# Patient Record
Sex: FEMALE | Race: WHITE | NOT HISPANIC OR LATINO | Employment: UNEMPLOYED | URBAN - METROPOLITAN AREA
[De-identification: names, ages, dates, MRNs, and addresses within clinical notes are randomized per-mention and may not be internally consistent; named-entity substitution may affect disease eponyms.]

---

## 2017-06-27 PROBLEM — E66.9 MILD OBESITY: Status: ACTIVE | Noted: 2017-06-27

## 2017-06-27 PROBLEM — R07.9 CHEST PAIN: Status: ACTIVE | Noted: 2017-06-27

## 2017-11-27 ENCOUNTER — HOSPITAL ENCOUNTER (INPATIENT)
Facility: OTHER | Age: 82
LOS: 3 days | Discharge: HOME | DRG: 247 | End: 2017-11-30
Attending: EMERGENCY MEDICINE | Admitting: INTERNAL MEDICINE
Payer: MEDICARE

## 2017-11-27 DIAGNOSIS — R79.89 ELEVATED TROPONIN: ICD-10-CM

## 2017-11-27 DIAGNOSIS — R55 NEAR SYNCOPE: Primary | ICD-10-CM

## 2017-11-27 DIAGNOSIS — I21.4 NON-ST ELEVATION MYOCARDIAL INFARCTION (NSTEMI): ICD-10-CM

## 2017-11-27 DIAGNOSIS — R05.9 COUGH: ICD-10-CM

## 2017-11-27 LAB
ALBUMIN SERPL BCP-MCNC: 3.2 G/DL
ALP SERPL-CCNC: 64 U/L
ALT SERPL W/O P-5'-P-CCNC: 23 U/L
ANION GAP SERPL CALC-SCNC: 6 MMOL/L
AST SERPL-CCNC: 24 U/L
BACTERIA #/AREA URNS HPF: ABNORMAL /HPF
BASOPHILS # BLD AUTO: 0.03 K/UL
BASOPHILS NFR BLD: 0.5 %
BILIRUB SERPL-MCNC: 0.3 MG/DL
BILIRUB UR QL STRIP: NEGATIVE
BUN SERPL-MCNC: 26 MG/DL
CALCIUM SERPL-MCNC: 9.3 MG/DL
CHLORIDE SERPL-SCNC: 103 MMOL/L
CLARITY UR: CLEAR
CO2 SERPL-SCNC: 33 MMOL/L
COLOR UR: YELLOW
CREAT SERPL-MCNC: 1 MG/DL
DIFFERENTIAL METHOD: ABNORMAL
EOSINOPHIL # BLD AUTO: 0.2 K/UL
EOSINOPHIL NFR BLD: 2.9 %
ERYTHROCYTE [DISTWIDTH] IN BLOOD BY AUTOMATED COUNT: 13.7 %
EST. GFR  (AFRICAN AMERICAN): 59 ML/MIN/1.73 M^2
EST. GFR  (NON AFRICAN AMERICAN): 51 ML/MIN/1.73 M^2
GLUCOSE SERPL-MCNC: 132 MG/DL
GLUCOSE UR QL STRIP: NEGATIVE
HCT VFR BLD AUTO: 40.4 %
HGB BLD-MCNC: 12.8 G/DL
HGB UR QL STRIP: ABNORMAL
HYALINE CASTS #/AREA URNS LPF: 1 /LPF
KETONES UR QL STRIP: NEGATIVE
LEUKOCYTE ESTERASE UR QL STRIP: ABNORMAL
LYMPHOCYTES # BLD AUTO: 2 K/UL
LYMPHOCYTES NFR BLD: 29.9 %
MAGNESIUM SERPL-MCNC: 1.9 MG/DL
MCH RBC QN AUTO: 29.8 PG
MCHC RBC AUTO-ENTMCNC: 31.7 G/DL
MCV RBC AUTO: 94 FL
MICROSCOPIC COMMENT: ABNORMAL
MONOCYTES # BLD AUTO: 0.7 K/UL
MONOCYTES NFR BLD: 10 %
NEUTROPHILS # BLD AUTO: 3.7 K/UL
NEUTROPHILS NFR BLD: 56.4 %
NITRITE UR QL STRIP: NEGATIVE
NON-SQ EPI CELLS #/AREA URNS HPF: 2 /HPF
PH UR STRIP: 6 [PH] (ref 5–8)
PLATELET # BLD AUTO: 200 K/UL
PMV BLD AUTO: 11.7 FL
POCT GLUCOSE: 137 MG/DL (ref 70–110)
POTASSIUM SERPL-SCNC: 3.9 MMOL/L
PROT SERPL-MCNC: 7.4 G/DL
PROT UR QL STRIP: NEGATIVE
RBC # BLD AUTO: 4.29 M/UL
RBC #/AREA URNS HPF: 5 /HPF (ref 0–4)
SODIUM SERPL-SCNC: 142 MMOL/L
SP GR UR STRIP: 1.01 (ref 1–1.03)
SQUAMOUS #/AREA URNS HPF: 6 /HPF
TROPONIN I SERPL DL<=0.01 NG/ML-MCNC: 1.98 NG/ML
TROPONIN I SERPL DL<=0.01 NG/ML-MCNC: 4.67 NG/ML
URN SPEC COLLECT METH UR: ABNORMAL
UROBILINOGEN UR STRIP-ACNC: NEGATIVE EU/DL
WBC # BLD AUTO: 6.53 K/UL
WBC #/AREA URNS HPF: 33 /HPF (ref 0–5)

## 2017-11-27 PROCEDURE — 83735 ASSAY OF MAGNESIUM: CPT

## 2017-11-27 PROCEDURE — 82962 GLUCOSE BLOOD TEST: CPT

## 2017-11-27 PROCEDURE — 80053 COMPREHEN METABOLIC PANEL: CPT

## 2017-11-27 PROCEDURE — 93010 ELECTROCARDIOGRAM REPORT: CPT | Mod: ,,, | Performed by: INTERNAL MEDICINE

## 2017-11-27 PROCEDURE — 11000001 HC ACUTE MED/SURG PRIVATE ROOM

## 2017-11-27 PROCEDURE — 81000 URINALYSIS NONAUTO W/SCOPE: CPT

## 2017-11-27 PROCEDURE — 25000003 PHARM REV CODE 250: Performed by: EMERGENCY MEDICINE

## 2017-11-27 PROCEDURE — 96372 THER/PROPH/DIAG INJ SC/IM: CPT

## 2017-11-27 PROCEDURE — 85025 COMPLETE CBC W/AUTO DIFF WBC: CPT

## 2017-11-27 PROCEDURE — 99285 EMERGENCY DEPT VISIT HI MDM: CPT | Mod: 25

## 2017-11-27 PROCEDURE — 63600175 PHARM REV CODE 636 W HCPCS: Performed by: INTERNAL MEDICINE

## 2017-11-27 PROCEDURE — 93005 ELECTROCARDIOGRAM TRACING: CPT

## 2017-11-27 PROCEDURE — 25000003 PHARM REV CODE 250: Performed by: INTERNAL MEDICINE

## 2017-11-27 PROCEDURE — 84484 ASSAY OF TROPONIN QUANT: CPT

## 2017-11-27 PROCEDURE — 36415 COLL VENOUS BLD VENIPUNCTURE: CPT

## 2017-11-27 RX ORDER — OXYBUTYNIN CHLORIDE 5 MG/1
5 TABLET, EXTENDED RELEASE ORAL DAILY
Status: DISCONTINUED | OUTPATIENT
Start: 2017-11-28 | End: 2017-11-30 | Stop reason: HOSPADM

## 2017-11-27 RX ORDER — CLOPIDOGREL 300 MG/1
300 TABLET, FILM COATED ORAL ONCE
Status: COMPLETED | OUTPATIENT
Start: 2017-11-27 | End: 2017-11-27

## 2017-11-27 RX ORDER — NITROGLYCERIN 80 MG/1
1 PATCH TRANSDERMAL DAILY
Status: DISCONTINUED | OUTPATIENT
Start: 2017-11-27 | End: 2017-11-28

## 2017-11-27 RX ORDER — ACETAMINOPHEN 325 MG/1
650 TABLET ORAL EVERY 8 HOURS PRN
Status: DISCONTINUED | OUTPATIENT
Start: 2017-11-27 | End: 2017-11-30 | Stop reason: HOSPADM

## 2017-11-27 RX ORDER — CLOPIDOGREL BISULFATE 75 MG/1
75 TABLET ORAL DAILY
Status: DISCONTINUED | OUTPATIENT
Start: 2017-11-28 | End: 2017-11-30 | Stop reason: HOSPADM

## 2017-11-27 RX ORDER — ASPIRIN 325 MG
325 TABLET ORAL
Status: COMPLETED | OUTPATIENT
Start: 2017-11-27 | End: 2017-11-27

## 2017-11-27 RX ORDER — SODIUM CHLORIDE 0.9 % (FLUSH) 0.9 %
3 SYRINGE (ML) INJECTION
Status: DISCONTINUED | OUTPATIENT
Start: 2017-11-27 | End: 2017-11-30 | Stop reason: HOSPADM

## 2017-11-27 RX ORDER — ASPIRIN 81 MG/1
81 TABLET ORAL DAILY
Status: DISCONTINUED | OUTPATIENT
Start: 2017-11-28 | End: 2017-11-30 | Stop reason: HOSPADM

## 2017-11-27 RX ORDER — ENOXAPARIN SODIUM 100 MG/ML
1 INJECTION SUBCUTANEOUS
Status: DISCONTINUED | OUTPATIENT
Start: 2017-11-27 | End: 2017-11-27

## 2017-11-27 RX ORDER — ENOXAPARIN SODIUM 100 MG/ML
1 INJECTION SUBCUTANEOUS
Status: COMPLETED | OUTPATIENT
Start: 2017-11-27 | End: 2017-11-27

## 2017-11-27 RX ORDER — LOSARTAN POTASSIUM 50 MG/1
100 TABLET ORAL DAILY
Status: DISCONTINUED | OUTPATIENT
Start: 2017-11-28 | End: 2017-11-30 | Stop reason: HOSPADM

## 2017-11-27 RX ORDER — TORSEMIDE 10 MG/1
10 TABLET ORAL DAILY
Status: DISCONTINUED | OUTPATIENT
Start: 2017-11-28 | End: 2017-11-27

## 2017-11-27 RX ORDER — ACETAMINOPHEN 325 MG/1
650 TABLET ORAL EVERY 8 HOURS PRN
Status: DISCONTINUED | OUTPATIENT
Start: 2017-11-27 | End: 2017-11-29

## 2017-11-27 RX ORDER — NITROGLYCERIN 0.4 MG/1
0.4 TABLET SUBLINGUAL EVERY 5 MIN PRN
Status: DISCONTINUED | OUTPATIENT
Start: 2017-11-27 | End: 2017-11-29

## 2017-11-27 RX ORDER — FAMOTIDINE 20 MG/1
20 TABLET, FILM COATED ORAL 2 TIMES DAILY
Status: DISCONTINUED | OUTPATIENT
Start: 2017-11-27 | End: 2017-11-30 | Stop reason: HOSPADM

## 2017-11-27 RX ORDER — METOPROLOL SUCCINATE 50 MG/1
50 TABLET, EXTENDED RELEASE ORAL DAILY
Status: DISCONTINUED | OUTPATIENT
Start: 2017-11-28 | End: 2017-11-30 | Stop reason: HOSPADM

## 2017-11-27 RX ORDER — RAMELTEON 8 MG/1
8 TABLET ORAL NIGHTLY PRN
Status: DISCONTINUED | OUTPATIENT
Start: 2017-11-27 | End: 2017-11-30 | Stop reason: HOSPADM

## 2017-11-27 RX ADMIN — ACETAMINOPHEN 650 MG: 325 TABLET ORAL at 08:11

## 2017-11-27 RX ADMIN — NITROGLYCERIN TRANSDERMAL SYSTEM 1 PATCH: 0.4 PATCH, EXTENDED RELEASE TRANSDERMAL at 08:11

## 2017-11-27 RX ADMIN — ASPIRIN 325 MG ORAL TABLET 325 MG: 325 PILL ORAL at 06:11

## 2017-11-27 RX ADMIN — FAMOTIDINE 20 MG: 20 TABLET, FILM COATED ORAL at 10:11

## 2017-11-27 RX ADMIN — ENOXAPARIN SODIUM 80 MG: 100 INJECTION SUBCUTANEOUS at 08:11

## 2017-11-27 RX ADMIN — CLOPIDOGREL BISULFATE 300 MG: 300 TABLET, FILM COATED ORAL at 08:11

## 2017-11-27 NOTE — ED TRIAGE NOTES
"Pt presents with fatigue. While ambulating to car, pt felt weak. Pt did not pass out, no injuries sustained. Pt reports "a sensation to the back of her head and then felt like her body was going to go down". Pt denies palpitations prior to passing out, but also reports a sensation to her chest. Pt denies SOB, nausea, diaphoresis. Pt denies any symptoms at this time. Pt reports no changes in bowel habits,. Pt reports decreased urinary frequency today. Pt denies any recent illnesses. Pt hx of HTN, endarterectomy, carotid stenosis. Pt compliant with medications. Medical hx list diastolic heart failure, pt not aware she was given this diagnosis.   "

## 2017-11-27 NOTE — ED NOTES
Lying on stretcher w/HOB elevated. AAOx4. Pt smiling. Denies any chest discomfort, sob, dizziness, lightheadedness, blurred vision or any discomfort at this time. Resp:20 even and unlabored. Comfort needs addressed. [place] to call light. Two warm blankets provided. Bed locked in low position, side rails up x2 and call light within reach. Will continue to monitor.

## 2017-11-27 NOTE — ED PROVIDER NOTES
"Encounter Date: 11/27/2017    SCRIBE #1 NOTE: I, Magi Doss, am scribing for, and in the presence of, Dr. Reddy.       History     Chief Complaint   Patient presents with    Fatigue     walking to car after podiatrist appointment; began feeling weak, like she was going to pass out and sob; per EMS initial /100     Time seen by provider: 5:08 PM    This is a 86 y.o. female, with history of HTN, and HLD, who presents via EMS with complaint of weakness which started a few hours ago. Sudden onset occurred while the patient was ambulating to her vehicle following a podiatry appointment. Per triage note, patient's initial BP was 240/100 upon arrival. Patient states that she felt near-syncopal and called the  for assistance. She notes that episode of weakness lasted for approximately ten minutes. The patient reports associated decreased urination over the last few days as well as weak sensation ("funny feeling") in the chest. She denies associated LOC, fever, chills, cough, congestion, nausea, vomiting, SOB, chest pain, palpitations, headache, visual disturbance, abdominal pain, diarrhea, or constipation. Patient adds that she last consumed food approximately one hour prior to near-syncopal episode. She states that she currently feels back to baseline. Patient is compliant to HTN medications last taken this morning. The patient has no additional complaints.      The history is provided by the patient.     Review of patient's allergies indicates:   Allergen Reactions    Apresoline [hydralazine]     Statins-hmg-coa reductase inhibitors      Cramps in legs and feet.    Streptomycin      Told never to take it would not make it to hospital     Past Medical History:   Diagnosis Date    Aortic valve disorders 7/4/2013    Carotid stenosis 6/3/2013    2/3/2011: L CEA for 80%+ stenosis with small ulcer. Mild WILLIAM disease.    Heart failure, diastolic 6/3/2013    Hypercholesterolemia 7/4/2013    " Cannot tolerate statins -muscle pains.    Hypertension, benign 6/3/2013    Diagnosed 1955.     Past Surgical History:   Procedure Laterality Date    APPENDECTOMY      BUNIONECTOMY Bilateral     CAROTID ENDARTERECTOMY      CATARACT EXTRACTION, BILATERAL      HYSTERECTOMY      TONSILLECTOMY       History reviewed. No pertinent family history.  Social History   Substance Use Topics    Smoking status: Former Smoker     Packs/day: 0.25     Years: 7.00     Start date: 1/1/1951     Quit date: 1/1/1958    Smokeless tobacco: Never Used    Alcohol use Yes      Comment: monthly      Review of Systems   Constitutional: Negative for chills and fever.   HENT: Negative for congestion and sore throat.    Eyes: Negative for visual disturbance.   Respiratory: Negative for cough and shortness of breath.    Cardiovascular: Negative for chest pain, palpitations and leg swelling.        Weakness sensation in the chest.   Gastrointestinal: Negative for abdominal pain, constipation, diarrhea, nausea and vomiting.   Genitourinary: Positive for decreased urine volume. Negative for dysuria.   Musculoskeletal: Negative for back pain.   Skin: Negative for rash.   Neurological: Positive for weakness (resolved). Negative for syncope and headaches.   Hematological: Does not bruise/bleed easily.       Physical Exam     Initial Vitals [11/27/17 1626]   BP Pulse Resp Temp SpO2   (!) 198/89 (!) 59 17 97.3 °F (36.3 °C) 95 %      MAP       125.33         Physical Exam    Nursing note and vitals reviewed.  Constitutional: She appears well-developed and well-nourished. She is not diaphoretic. She is cooperative.  Non-toxic appearance. No distress.   HENT:   Head: Normocephalic and atraumatic.   Eyes: Conjunctivae and EOM are normal. Pupils are equal, round, and reactive to light.   Neck: Normal range of motion and full passive range of motion without pain. Neck supple. No thyromegaly present. No JVD present.   Cardiovascular: Normal rate,  regular rhythm, normal heart sounds and normal pulses. Exam reveals no gallop and no friction rub.    No murmur heard.  Pulmonary/Chest: Effort normal and breath sounds normal. No respiratory distress. She has no wheezes. She has no rhonchi. She has no rales.   Abdominal: Soft. Normal appearance and bowel sounds are normal. She exhibits no distension and no mass. There is no tenderness. There is no rebound and no guarding.   Musculoskeletal: Normal range of motion.   Neurological: She is alert and oriented to person, place, and time. She has normal strength. No cranial nerve deficit or sensory deficit.   No facial droop. Normal speech.   Skin: Skin is warm, dry and intact. No rash noted.   Psychiatric: She has a normal mood and affect. Her speech is normal and behavior is normal. Judgment and thought content normal.         ED Course   Procedures  Labs Reviewed   CBC W/ AUTO DIFFERENTIAL - Abnormal; Notable for the following:        Result Value    MCHC 31.7 (*)     All other components within normal limits   COMPREHENSIVE METABOLIC PANEL - Abnormal; Notable for the following:     CO2 33 (*)     Glucose 132 (*)     BUN, Bld 26 (*)     Albumin 3.2 (*)     Anion Gap 6 (*)     eGFR if  59 (*)     eGFR if non  51 (*)     All other components within normal limits   TROPONIN I - Abnormal; Notable for the following:     Troponin I 1.979 (*)     All other components within normal limits   URINALYSIS - Abnormal; Notable for the following:     Occult Blood UA Trace (*)     Leukocytes, UA 2+ (*)     All other components within normal limits   URINALYSIS MICROSCOPIC - Abnormal; Notable for the following:     RBC, UA 5 (*)     WBC, UA 33 (*)     Non-Squam Epith 2 (*)     All other components within normal limits   POCT GLUCOSE - Abnormal; Notable for the following:     POCT Glucose 137 (*)     All other components within normal limits   MAGNESIUM   TROPONIN I   POCT GLUCOSE MONITORING CONTINUOUS      EKG Readings: (Independently Interpreted)   Initial Reading: No STEMI.   EKG Reading (5:02 PM)- Sinus bradycardia at a rate of 55 bpm. Normal axis. Narrow QRS complex. No STEMI. Unchanged from 2016.          Medical Decision Making:   Initial Assessment:   Urgent evaluation a 86-year-old female with challenging to control hypertension, here given episode of near syncope occurring this afternoon approximately 3 PM after visiting her podiatrist.  Patient endorses ambulating to her car, with abrupt onset of dizziness.  Patient denies palpitations nor shortness of breath, and no known hx of PE.  Patient had a normal stress echo 8/2017 with mild aortic stenosis noted 7/17 per cardiology- Dr Wilson. Pt did have a small meal earlier in day and take bp meds. + urinary hesitancy in last week. Broad ddx includes uti, vasovagal episode, dysrhythmia, vertigo, orthostasis. Will obtain labs including cardiac markers given comorbid conditions and reassess.   Independently Interpreted Test(s):   I have ordered and independently interpreted X-rays - see prior notes.  I have ordered and independently interpreted EKG Reading(s) - see prior notes  Clinical Tests:   Lab Tests: Ordered and Reviewed  Radiological Study: Ordered and Reviewed  Medical Tests: Ordered and Reviewed  ED Management:  Labs notable for elevated troponin, concerning given hx of vascular disease. Call made to cardiology and aspirin, lovenox ordered.    6:18PM- Discussed case with Dr. Wilson. Will admit patient to Cardiology with plans for cath in am.     Imaging Results          X-Ray Chest 1 View (Final result)  Result time 11/27/17 17:39:28    Final result by Prateek Juárez MD (11/27/17 17:39:28)                 Impression:      No acute cardiopulmonary process identified.      Electronically signed by: PRATEEK JUÁREZ MD  Date:     11/27/17  Time:    17:39              Narrative:    Chest AP single view.  Comparison: 8/2012, 2/2011, CT  9/2008.    Cardiac silhouette is upper limits of normal in size, noting magnification on the single AP view.  Lungs are slightly hypoinflated which accentuates pulmonary vascular markings.  There is prominence of the left superhilar region likely representing prominent pulmonary vasculature in this region, noting that findings are unchanged since at least 2008.  No convincing evidence of mass lesion in this region.  There is coarse bilateral interstitial attenuation.  No evidence of confluent consolidation, pneumothorax, or significant effusion.  Bones show DJD.                                      Scribe Attestation:   Scribe #1: I performed the above scribed service and the documentation accurately describes the services I performed. I attest to the accuracy of the note.    I, Dr. Lara Reddy, personally performed the services described in this documentation. All medical record entries made by the scribe were at my direction and in my presence.  I have reviewed the chart and agree that the record reflects my personal performance and is accurate and complete. Lara Reddy MD.  11:02 PM 11/27/2017          ED Course      Clinical Impression:     1. Near syncope    2. Cough    3. Elevated troponin    4. Non-ST elevation myocardial infarction (NSTEMI)          Disposition:   Disposition: Admitted  Condition: Serious                        Lara Reddy MD  11/27/17 0088

## 2017-11-28 ENCOUNTER — SURGERY (OUTPATIENT)
Age: 82
End: 2017-11-28

## 2017-11-28 PROBLEM — Z95.5 HISTORY OF CORONARY ARTERY STENT PLACEMENT: Status: ACTIVE | Noted: 2017-11-28

## 2017-11-28 PROBLEM — I25.10 CORONARY ARTERY DISEASE: Status: ACTIVE | Noted: 2017-11-28

## 2017-11-28 LAB
ANION GAP SERPL CALC-SCNC: 6 MMOL/L
BUN SERPL-MCNC: 25 MG/DL
CALCIUM SERPL-MCNC: 8.4 MG/DL
CHLORIDE SERPL-SCNC: 106 MMOL/L
CO2 SERPL-SCNC: 28 MMOL/L
CREAT SERPL-MCNC: 0.8 MG/DL
EST. GFR  (AFRICAN AMERICAN): >60 ML/MIN/1.73 M^2
EST. GFR  (NON AFRICAN AMERICAN): >60 ML/MIN/1.73 M^2
GLUCOSE SERPL-MCNC: 100 MG/DL
POTASSIUM SERPL-SCNC: 3.5 MMOL/L
SODIUM SERPL-SCNC: 140 MMOL/L
TROPONIN I SERPL DL<=0.01 NG/ML-MCNC: 1.06 NG/ML
TROPONIN I SERPL DL<=0.01 NG/ML-MCNC: 5.32 NG/ML

## 2017-11-28 PROCEDURE — C1887 CATHETER, GUIDING: HCPCS

## 2017-11-28 PROCEDURE — 84484 ASSAY OF TROPONIN QUANT: CPT

## 2017-11-28 PROCEDURE — 36415 COLL VENOUS BLD VENIPUNCTURE: CPT

## 2017-11-28 PROCEDURE — 25000003 PHARM REV CODE 250: Performed by: INTERNAL MEDICINE

## 2017-11-28 PROCEDURE — 80048 BASIC METABOLIC PNL TOTAL CA: CPT

## 2017-11-28 PROCEDURE — 93005 ELECTROCARDIOGRAM TRACING: CPT

## 2017-11-28 PROCEDURE — B2111ZZ FLUOROSCOPY OF MULTIPLE CORONARY ARTERIES USING LOW OSMOLAR CONTRAST: ICD-10-PCS | Performed by: INTERNAL MEDICINE

## 2017-11-28 PROCEDURE — 027034Z DILATION OF CORONARY ARTERY, ONE ARTERY WITH DRUG-ELUTING INTRALUMINAL DEVICE, PERCUTANEOUS APPROACH: ICD-10-PCS | Performed by: INTERNAL MEDICINE

## 2017-11-28 PROCEDURE — 4A023N7 MEASUREMENT OF CARDIAC SAMPLING AND PRESSURE, LEFT HEART, PERCUTANEOUS APPROACH: ICD-10-PCS | Performed by: INTERNAL MEDICINE

## 2017-11-28 PROCEDURE — 25000003 PHARM REV CODE 250: Performed by: EMERGENCY MEDICINE

## 2017-11-28 PROCEDURE — 94761 N-INVAS EAR/PLS OXIMETRY MLT: CPT

## 2017-11-28 PROCEDURE — 25000003 PHARM REV CODE 250

## 2017-11-28 PROCEDURE — 20000000 HC ICU ROOM

## 2017-11-28 PROCEDURE — 63600175 PHARM REV CODE 636 W HCPCS

## 2017-11-28 PROCEDURE — 99900035 HC TECH TIME PER 15 MIN (STAT)

## 2017-11-28 PROCEDURE — 25500020 PHARM REV CODE 255

## 2017-11-28 PROCEDURE — 93010 ELECTROCARDIOGRAM REPORT: CPT | Mod: ,,, | Performed by: INTERNAL MEDICINE

## 2017-11-28 PROCEDURE — B2151ZZ FLUOROSCOPY OF LEFT HEART USING LOW OSMOLAR CONTRAST: ICD-10-PCS | Performed by: INTERNAL MEDICINE

## 2017-11-28 RX ORDER — HYDROCODONE BITARTRATE AND ACETAMINOPHEN 5; 325 MG/1; MG/1
1 TABLET ORAL EVERY 4 HOURS PRN
Status: DISCONTINUED | OUTPATIENT
Start: 2017-11-28 | End: 2017-11-29

## 2017-11-28 RX ORDER — ACETAMINOPHEN 325 MG/1
650 TABLET ORAL EVERY 4 HOURS PRN
Status: DISCONTINUED | OUTPATIENT
Start: 2017-11-28 | End: 2017-11-29

## 2017-11-28 RX ORDER — ONDANSETRON 8 MG/1
8 TABLET, ORALLY DISINTEGRATING ORAL EVERY 8 HOURS PRN
Status: DISCONTINUED | OUTPATIENT
Start: 2017-11-28 | End: 2017-11-29

## 2017-11-28 RX ORDER — DIPHENHYDRAMINE HCL 25 MG
25 CAPSULE ORAL
Status: DISCONTINUED | OUTPATIENT
Start: 2017-11-28 | End: 2017-11-28

## 2017-11-28 RX ORDER — SODIUM CHLORIDE 9 MG/ML
INJECTION, SOLUTION INTRAVENOUS CONTINUOUS
Status: DISCONTINUED | OUTPATIENT
Start: 2017-11-28 | End: 2017-11-28

## 2017-11-28 RX ORDER — TORSEMIDE 10 MG/1
10 TABLET ORAL DAILY
Status: DISCONTINUED | OUTPATIENT
Start: 2017-11-29 | End: 2017-11-30 | Stop reason: HOSPADM

## 2017-11-28 RX ORDER — SODIUM CHLORIDE 9 MG/ML
INJECTION, SOLUTION INTRAVENOUS CONTINUOUS
Status: ACTIVE | OUTPATIENT
Start: 2017-11-28 | End: 2017-11-28

## 2017-11-28 RX ORDER — SPIRONOLACTONE 25 MG/1
25 TABLET ORAL DAILY
Status: DISCONTINUED | OUTPATIENT
Start: 2017-11-28 | End: 2017-11-30 | Stop reason: HOSPADM

## 2017-11-28 RX ADMIN — ACETAMINOPHEN 650 MG: 325 TABLET ORAL at 04:11

## 2017-11-28 RX ADMIN — HYDROCODONE BITARTRATE AND ACETAMINOPHEN 1 TABLET: 5; 325 TABLET ORAL at 11:11

## 2017-11-28 RX ADMIN — NITROGLYCERIN TRANSDERMAL SYSTEM 1 PATCH: 0.4 PATCH, EXTENDED RELEASE TRANSDERMAL at 11:11

## 2017-11-28 RX ADMIN — LOSARTAN POTASSIUM 100 MG: 50 TABLET, FILM COATED ORAL at 11:11

## 2017-11-28 RX ADMIN — METOPROLOL SUCCINATE 50 MG: 50 TABLET, EXTENDED RELEASE ORAL at 11:11

## 2017-11-28 RX ADMIN — SODIUM CHLORIDE: 0.9 INJECTION, SOLUTION INTRAVENOUS at 07:11

## 2017-11-28 RX ADMIN — FAMOTIDINE 20 MG: 20 TABLET, FILM COATED ORAL at 08:11

## 2017-11-28 RX ADMIN — OXYBUTYNIN CHLORIDE 5 MG: 5 TABLET, EXTENDED RELEASE ORAL at 11:11

## 2017-11-28 RX ADMIN — FAMOTIDINE 20 MG: 20 TABLET, FILM COATED ORAL at 11:11

## 2017-11-28 RX ADMIN — SPIRONOLACTONE 25 MG: 25 TABLET, FILM COATED ORAL at 08:11

## 2017-11-28 RX ADMIN — HYDROCODONE BITARTRATE AND ACETAMINOPHEN 1 TABLET: 5; 325 TABLET ORAL at 06:11

## 2017-11-28 RX ADMIN — SODIUM CHLORIDE: 0.9 INJECTION, SOLUTION INTRAVENOUS at 11:11

## 2017-11-28 RX ADMIN — CLOPIDOGREL 75 MG: 75 TABLET, FILM COATED ORAL at 11:11

## 2017-11-28 RX ADMIN — ASPIRIN 81 MG: 81 TABLET, COATED ORAL at 11:11

## 2017-11-28 RX ADMIN — DIPHENHYDRAMINE HYDROCHLORIDE 25 MG: 25 CAPSULE ORAL at 07:11

## 2017-11-28 RX ADMIN — NITROGLYCERIN 0.4 MG: 0.4 TABLET SUBLINGUAL at 11:11

## 2017-11-28 NOTE — SUBJECTIVE & OBJECTIVE
Past Medical History:   Diagnosis Date    Aortic valve disorders 7/4/2013    Carotid stenosis 6/3/2013    2/3/2011: L CEA for 80%+ stenosis with small ulcer. Mild WILLIAM disease.    Heart failure, diastolic 6/3/2013    Hypercholesterolemia 7/4/2013    Cannot tolerate statins -muscle pains.    Hypertension, benign 6/3/2013    Diagnosed 1955.       Past Surgical History:   Procedure Laterality Date    APPENDECTOMY      BUNIONECTOMY Bilateral     CAROTID ENDARTERECTOMY      CATARACT EXTRACTION, BILATERAL      HYSTERECTOMY      TONSILLECTOMY         Review of patient's allergies indicates:   Allergen Reactions    Apresoline [hydralazine]     Statins-hmg-coa reductase inhibitors      Cramps in legs and feet.    Streptomycin      Told never to take it would not make it to hospital       No current facility-administered medications on file prior to encounter.      Current Outpatient Prescriptions on File Prior to Encounter   Medication Sig    aspirin (ECOTRIN) 81 MG EC tablet Take 1 tablet (81 mg total) by mouth once daily.    clorazepate (TRANXENE) 3.75 MG Tab Take 7.5 mg by mouth 3 (three) times daily.    losartan (COZAAR) 100 MG tablet Take 1 tablet (100 mg total) by mouth once daily.    metoprolol succinate (TOPROL-XL) 50 MG 24 hr tablet Take 1 tablet (50 mg total) by mouth once daily.    oxybutynin (DITROPAN-XL) 5 MG TR24 Take 5 mg by mouth once daily.    torsemide (DEMADEX) 10 MG Tab Take 1 tablet (10 mg total) by mouth once daily.     Family History     None        Social History Main Topics    Smoking status: Former Smoker     Packs/day: 0.25     Years: 7.00     Start date: 1/1/1951     Quit date: 1/1/1958    Smokeless tobacco: Never Used    Alcohol use Yes      Comment: monthly     Drug use: No    Sexual activity: Not on file     Review of Systems   Constitution: Negative for chills, fever, weakness and malaise/fatigue.   HENT: Negative for nosebleeds.    Eyes: Negative for double vision,  vision loss in left eye and vision loss in right eye.   Cardiovascular: Positive for chest pain. Negative for claudication, dyspnea on exertion, irregular heartbeat, leg swelling, near-syncope, orthopnea, palpitations, paroxysmal nocturnal dyspnea and syncope.   Respiratory: Negative for cough, hemoptysis, shortness of breath and wheezing.    Endocrine: Negative for cold intolerance and heat intolerance.   Hematologic/Lymphatic: Negative for bleeding problem. Does not bruise/bleed easily.   Skin: Negative for color change and rash.   Musculoskeletal: Positive for back pain and neck pain. Negative for falls, muscle weakness and myalgias.   Gastrointestinal: Negative for heartburn, hematemesis, hematochezia, hemorrhoids, jaundice, melena, nausea and vomiting.   Genitourinary: Negative for dysuria and hematuria.   Neurological: Negative for dizziness, focal weakness, headaches, light-headedness, loss of balance, numbness and vertigo.   Psychiatric/Behavioral: Negative for altered mental status, depression and memory loss. The patient is not nervous/anxious.    Allergic/Immunologic: Negative for hives and persistent infections.     Objective:     Vital Signs (Most Recent):  Temp: 97.3 °F (36.3 °C) (11/27/17 1626)  Pulse: 60 (11/27/17 1925)  Resp: 18 (11/27/17 1925)  BP: (!) 184/74 (11/27/17 1925)  SpO2: 99 % (11/27/17 1925) Vital Signs (24h Range):  Temp:  [97.3 °F (36.3 °C)] 97.3 °F (36.3 °C)  Pulse:  [54-63] 60  Resp:  [17-19] 18  SpO2:  [95 %-99 %] 99 %  BP: (171-221)/(72-94) 184/74     Weight: 81.2 kg (179 lb)  Body mass index is 30.73 kg/m².    SpO2: 99 %  O2 Device (Oxygen Therapy): room air    No intake or output data in the 24 hours ending 11/27/17 1931    Lines/Drains/Airways     Peripheral Intravenous Line                 Peripheral IV - Single Lumen 11/27/17 1623 Left Antecubital less than 1 day                Physical Exam   Constitutional: She is oriented to person, place, and time. She appears  well-developed and well-nourished.  Non-toxic appearance. No distress.   HENT:   Head: Normocephalic and atraumatic.   Nose: Nose normal.   Eyes: Right eye exhibits no discharge. Left eye exhibits no discharge. Right conjunctiva is not injected. Left conjunctiva is not injected. Right pupil is round. Left pupil is round. Pupils are equal.   Neck: Neck supple. No JVD present. Carotid bruit is not present. No thyromegaly present.   Left CEA scar.   Cardiovascular: Normal rate, regular rhythm, S1 normal and S2 normal.   No extrasystoles are present. PMI is not displaced.  Exam reveals gallop and S4.    Murmur heard.   Harsh midsystolic murmur is present with a grade of 2/6  at the upper right sternal border  Pulses:       Radial pulses are 2+ on the right side, and 2+ on the left side.        Femoral pulses are 1+ on the right side, and 1+ on the left side.       Dorsalis pedis pulses are 1+ on the right side, and 1+ on the left side.        Posterior tibial pulses are 1+ on the right side, and 1+ on the left side.   Pulmonary/Chest: Effort normal and breath sounds normal.   Abdominal: Soft. Normal appearance. There is no hepatosplenomegaly. There is no tenderness.   Musculoskeletal:        Right ankle: She exhibits no swelling, no ecchymosis and no deformity.        Left ankle: She exhibits no swelling, no ecchymosis and no deformity.   Lymphadenopathy:        Head (right side): No submandibular adenopathy present.        Head (left side): No submandibular adenopathy present.     She has no cervical adenopathy.   Neurological: She is alert and oriented to person, place, and time. She is not disoriented. No cranial nerve deficit.   Skin: Skin is warm, dry and intact. No rash noted. She is not diaphoretic. No cyanosis. Nails show no clubbing.   Psychiatric: She has a normal mood and affect. Her speech is normal and behavior is normal. Judgment and thought content normal. Cognition and memory are normal.     Current  Medications:     [START ON 11/28/2017] aspirin  81 mg Oral Daily    clopidogrel  300 mg Oral Once    [START ON 11/28/2017] clopidogrel  75 mg Oral Daily    enoxparin  1 mg/kg Subcutaneous Q12H    famotidine  20 mg Oral BID    [START ON 11/28/2017] losartan  100 mg Oral Daily    [START ON 11/28/2017] metoprolol succinate  50 mg Oral Daily    [START ON 11/28/2017] oxybutynin  5 mg Oral Daily     Current Laboratory Results:    Recent Results (from the past 24 hour(s))   POCT glucose    Collection Time: 11/27/17  4:54 PM   Result Value Ref Range    POCT Glucose 137 (H) 70 - 110 mg/dL   CBC auto differential    Collection Time: 11/27/17  5:09 PM   Result Value Ref Range    WBC 6.53 3.90 - 12.70 K/uL    RBC 4.29 4.00 - 5.40 M/uL    Hemoglobin 12.8 12.0 - 16.0 g/dL    Hematocrit 40.4 37.0 - 48.5 %    MCV 94 82 - 98 fL    MCH 29.8 27.0 - 31.0 pg    MCHC 31.7 (L) 32.0 - 36.0 g/dL    RDW 13.7 11.5 - 14.5 %    Platelets 200 150 - 350 K/uL    MPV 11.7 9.2 - 12.9 fL    Gran # 3.7 1.8 - 7.7 K/uL    Lymph # 2.0 1.0 - 4.8 K/uL    Mono # 0.7 0.3 - 1.0 K/uL    Eos # 0.2 0.0 - 0.5 K/uL    Baso # 0.03 0.00 - 0.20 K/uL    Gran% 56.4 38.0 - 73.0 %    Lymph% 29.9 18.0 - 48.0 %    Mono% 10.0 4.0 - 15.0 %    Eosinophil% 2.9 0.0 - 8.0 %    Basophil% 0.5 0.0 - 1.9 %    Differential Method Automated    Comprehensive metabolic panel    Collection Time: 11/27/17  5:09 PM   Result Value Ref Range    Sodium 142 136 - 145 mmol/L    Potassium 3.9 3.5 - 5.1 mmol/L    Chloride 103 95 - 110 mmol/L    CO2 33 (H) 23 - 29 mmol/L    Glucose 132 (H) 70 - 110 mg/dL    BUN, Bld 26 (H) 8 - 23 mg/dL    Creatinine 1.0 0.5 - 1.4 mg/dL    Calcium 9.3 8.7 - 10.5 mg/dL    Total Protein 7.4 6.0 - 8.4 g/dL    Albumin 3.2 (L) 3.5 - 5.2 g/dL    Total Bilirubin 0.3 0.1 - 1.0 mg/dL    Alkaline Phosphatase 64 55 - 135 U/L    AST 24 10 - 40 U/L    ALT 23 10 - 44 U/L    Anion Gap 6 (L) 8 - 16 mmol/L    eGFR if African American 59 (A) >60 mL/min/1.73 m^2    eGFR if  non  51 (A) >60 mL/min/1.73 m^2   Troponin I    Collection Time: 11/27/17  5:09 PM   Result Value Ref Range    Troponin I 1.979 (H) 0.000 - 0.026 ng/mL   Magnesium    Collection Time: 11/27/17  5:09 PM   Result Value Ref Range    Magnesium 1.9 1.6 - 2.6 mg/dL   Urinalysis    Collection Time: 11/27/17  5:45 PM   Result Value Ref Range    Specimen UA Urine, Clean Catch     Color, UA Yellow Yellow, Straw, Lydia    Appearance, UA Clear Clear    pH, UA 6.0 5.0 - 8.0    Specific Gravity, UA 1.015 1.005 - 1.030    Protein, UA Negative Negative    Glucose, UA Negative Negative    Ketones, UA Negative Negative    Bilirubin (UA) Negative Negative    Occult Blood UA Trace (A) Negative    Nitrite, UA Negative Negative    Urobilinogen, UA Negative <2.0 EU/dL    Leukocytes, UA 2+ (A) Negative   Urinalysis Microscopic    Collection Time: 11/27/17  5:45 PM   Result Value Ref Range    RBC, UA 5 (H) 0 - 4 /hpf    WBC, UA 33 (H) 0 - 5 /hpf    Bacteria, UA Rare None-Occ /hpf    Squam Epithel, UA 6 /hpf    Non-Squam Epith 2 (A) <1/hpf /hpf    Hyaline Casts, UA 1 0-1/lpf /lpf    Microscopic Comment SEE COMMENT      Current Imaging Results:    Imaging Results          X-Ray Chest 1 View (Final result)  Result time 11/27/17 17:39:28    Final result by Prateek Juárez MD (11/27/17 17:39:28)                 Impression:      No acute cardiopulmonary process identified.      Electronically signed by: PRATEEK JUÁREZ MD  Date:     11/27/17  Time:    17:39              Narrative:    Chest AP single view.  Comparison: 8/2012, 2/2011, CT 9/2008.    Cardiac silhouette is upper limits of normal in size, noting magnification on the single AP view.  Lungs are slightly hypoinflated which accentuates pulmonary vascular markings.  There is prominence of the left superhilar region likely representing prominent pulmonary vasculature in this region, noting that findings are unchanged since at least 2008.  No convincing evidence of mass  lesion in this region.  There is coarse bilateral interstitial attenuation.  No evidence of confluent consolidation, pneumothorax, or significant effusion.  Bones show DJD.                            ECG: NSR. Very subtle ST depression in the inferior leads.

## 2017-11-28 NOTE — NURSING
Pt transported to room accompanied by tech and family members. AAOX4. Oriented to room. No distress noted.

## 2017-11-28 NOTE — HPI
Debo Ceballos is a 86 y.o. female with long history of difficult to control hypertension and hypercholesterolemia. She is mildly obese. She has severe carotid artery disease and underwent left carotid endarterectomy in 2011. She cannot tolerate statins as that causes severe muscle pains. She could not tolerate carvedilol either as she felt that caused her to get an unsteady gait and upset stomach. She could not tolerate amlodipine as that caused leg edema. She had an MRI for questionable slurred speech in 4/2013 which revealed generalized atrophy. Beginning in 7/2017 after her  passed she experienced occasional mild chest pressure. On 8/9/2017 she had a Stress Echo and was able to do 3:00 minutes without any CP and the ECG was negative as was the Echo. The chest discomfort resolved.    On 11/27/2017 after visiting her podiatrist she walked out to her car. She fairly suddenly fel a mild chest pressure with a discomfort in the back of her neck. She denies any dyspnea. She felt weak and thought she may pass out. She called for help and was brought to the ER. She had no further chest discomfort. The ECG does not reveal any clear acute changes. Troponin however is elevated to 2.0. She is admitted.

## 2017-11-28 NOTE — PLAN OF CARE
ATTN: TEAM DC PLANNING    Not in room    RNCM / SSW TO F/U WITH ASSESSMENT LATER     Higinio Estrada RN  Case management 11/28/201711:03 AM  # [phone] (FAX) [phone]

## 2017-11-28 NOTE — BRIEF OP NOTE
11/28/2017: [facility] [place]: LAD: 80% involving D1. D1 osteal 80%. LV: Normal systolic function. LAD: LM 3.0 x 18 mm. D1: PTCA 2.5 mm.    Ken Wilson M.D.

## 2017-11-28 NOTE — PLAN OF CARE
SW met with pt at bedside to complete disharge assessment.  Pt lives alone but daughter lives next door.  Pt is independent with ADL and drives self.  No needs identified at this time.     11/28/17 1611   Discharge Assessment   Assessment Type Discharge Planning Assessment   Confirmed/corrected address and phone number on facesheet? Yes   Assessment information obtained from? Patient   Communicated expected length of stay with patient/caregiver no   Prior to hospitilization cognitive status: Alert/Oriented   Prior to hospitalization functional status: Independent   Current cognitive status: Alert/Oriented   Current Functional Status: Needs Assistance   Lives With alone   Able to Return to Prior Arrangements yes   Is patient able to care for self after discharge? Unable to determine at this time (comments)   Who are your caregiver(s) and their phone number(s)? (Betsy, daughter, [phone])   Readmission Within The Last 30 Days no previous admission in last 30 days   Patient currently being followed by outpatient case management? No   Patient currently receives any other outside agency services? No   Equipment Currently Used at Home none   Do you have any problems affording any of your prescribed medications? No   Is the patient taking medications as prescribed? yes   Does the patient have transportation home? Yes   Transportation Available family or friend will provide  (personal transportation)   Does the patient receive services at the [facility]? No   Discharge Plan A Home   Discharge Plan B Home Health

## 2017-11-28 NOTE — PLAN OF CARE
Problem: Patient Care Overview  Goal: Plan of Care Review  Outcome: Ongoing (interventions implemented as appropriate)  PT Npo for angiogram. Voiced no complaints at this time. Son at bedside. No distress noted. Will continue with plan of care.

## 2017-11-28 NOTE — ED NOTES
ELEVATED TROPONIN: 1.979   Dr. Reddy notified of pt's elevated troponin. Infomred MD of pt's elevated BP:214/94 No new orders received.

## 2017-11-28 NOTE — H&P
[facility]  Cardiology  History and Physical     Patient Name: Debo Ceballos  MRN: [id number]  Admission Date: 11/27/2017  Code Status: Full Code   Attending Provider: Ken Wilson MD   Primary Care Physician: Ken Wilson MD  Principal Problem:Non-ST elevation myocardial infarction (NSTEMI)    Patient information was obtained from patient, past medical records and ER records.     Subjective:     Chief Complaint:  Chest Pain      HPI:  Debo Ceballos is a 86 y.o. female with long history of difficult to control hypertension and hypercholesterolemia. She is mildly obese. She has severe carotid artery disease and underwent left carotid endarterectomy in 2011. She cannot tolerate statins as that causes severe muscle pains. She could not tolerate carvedilol either as she felt that caused her to get an unsteady gait and upset stomach. She could not tolerate amlodipine as that caused leg edema. She had an MRI for questionable slurred speech in 4/2013 which revealed generalized atrophy. Beginning in 7/2017 after her  passed she experienced occasional mild chest pressure. On 8/9/2017 she had a Stress Echo and was able to do 3:00 minutes without any CP and the ECG was negative as was the Echo.  The chest discomfort resolved.    On 11/27/2017 after visiting her podiatrist she walked out to her car. She fairly suddenly fel a mild chest pressure with a discomfort in the back of her neck. She denies any dyspnea. She felt weak and thought she may pass out. She called for help and was brought to the ER. She had no further chest discomfort. The ECG does not reveal any clear acute changes. Troponin however is elevated to 2.0. She is admitted.      Past Medical History:   Diagnosis Date    Aortic valve disorders 7/4/2013    Carotid stenosis 6/3/2013    2/3/2011: L CEA for 80%+ stenosis with small ulcer. Mild WILLIAM disease.    Heart failure, diastolic 6/3/2013    Hypercholesterolemia  7/4/2013    Cannot tolerate statins -muscle pains.    Hypertension, benign 6/3/2013    Diagnosed 1955.       Past Surgical History:   Procedure Laterality Date    APPENDECTOMY      BUNIONECTOMY Bilateral     CAROTID ENDARTERECTOMY      CATARACT EXTRACTION, BILATERAL      HYSTERECTOMY      TONSILLECTOMY         Review of patient's allergies indicates:   Allergen Reactions    Apresoline [hydralazine]     Statins-hmg-coa reductase inhibitors      Cramps in legs and feet.    Streptomycin      Told never to take it would not make it to hospital       No current facility-administered medications on file prior to encounter.      Current Outpatient Prescriptions on File Prior to Encounter   Medication Sig    aspirin (ECOTRIN) 81 MG EC tablet Take 1 tablet (81 mg total) by mouth once daily.    clorazepate (TRANXENE) 3.75 MG Tab Take 7.5 mg by mouth 3 (three) times daily.    losartan (COZAAR) 100 MG tablet Take 1 tablet (100 mg total) by mouth once daily.    metoprolol succinate (TOPROL-XL) 50 MG 24 hr tablet Take 1 tablet (50 mg total) by mouth once daily.    oxybutynin (DITROPAN-XL) 5 MG TR24 Take 5 mg by mouth once daily.    torsemide (DEMADEX) 10 MG Tab Take 1 tablet (10 mg total) by mouth once daily.     Family History     None        Social History Main Topics    Smoking status: Former Smoker     Packs/day: 0.25     Years: 7.00     Start date: 1/1/1951     Quit date: 1/1/1958    Smokeless tobacco: Never Used    Alcohol use Yes      Comment: monthly     Drug use: No    Sexual activity: Not on file     Review of Systems   Constitution: Negative for chills, fever, weakness and malaise/fatigue.   HENT: Negative for nosebleeds.    Eyes: Negative for double vision, vision loss in left eye and vision loss in right eye.   Cardiovascular: Positive for chest pain. Negative for claudication, dyspnea on exertion, irregular heartbeat, leg swelling, near-syncope, orthopnea, palpitations, paroxysmal nocturnal  dyspnea and syncope.   Respiratory: Negative for cough, hemoptysis, shortness of breath and wheezing.    Endocrine: Negative for cold intolerance and heat intolerance.   Hematologic/Lymphatic: Negative for bleeding problem. Does not bruise/bleed easily.   Skin: Negative for color change and rash.   Musculoskeletal: Positive for back pain and neck pain. Negative for falls, muscle weakness and myalgias.   Gastrointestinal: Negative for heartburn, hematemesis, hematochezia, hemorrhoids, jaundice, melena, nausea and vomiting.   Genitourinary: Negative for dysuria and hematuria.   Neurological: Negative for dizziness, focal weakness, headaches, light-headedness, loss of balance, numbness and vertigo.   Psychiatric/Behavioral: Negative for altered mental status, depression and memory loss. The patient is not nervous/anxious.    Allergic/Immunologic: Negative for hives and persistent infections.     Objective:     Vital Signs (Most Recent):  Temp: 97.3 °F (36.3 °C) (11/27/17 1626)  Pulse: 60 (11/27/17 1925)  Resp: 18 (11/27/17 1925)  BP: (!) 184/74 (11/27/17 1925)  SpO2: 99 % (11/27/17 1925) Vital Signs (24h Range):  Temp:  [97.3 °F (36.3 °C)] 97.3 °F (36.3 °C)  Pulse:  [54-63] 60  Resp:  [17-19] 18  SpO2:  [95 %-99 %] 99 %  BP: (171-221)/(72-94) 184/74     Weight: 81.2 kg (179 lb)  Body mass index is 30.73 kg/m².    SpO2: 99 %  O2 Device (Oxygen Therapy): room air    No intake or output data in the 24 hours ending 11/27/17 1931    Lines/Drains/Airways     Peripheral Intravenous Line                 Peripheral IV - Single Lumen 11/27/17 1623 Left Antecubital less than 1 day                Physical Exam   Constitutional: She is oriented to person, place, and time. She appears well-developed and well-nourished.  Non-toxic appearance. No distress.   HENT:   Head: Normocephalic and atraumatic.   Nose: Nose normal.   Eyes: Right eye exhibits no discharge. Left eye exhibits no discharge. Right conjunctiva is not injected. Left  conjunctiva is not injected. Right pupil is round. Left pupil is round. Pupils are equal.   Neck: Neck supple. No JVD present. Carotid bruit is not present. No thyromegaly present.   Left CEA scar.   Cardiovascular: Normal rate, regular rhythm, S1 normal and S2 normal.   No extrasystoles are present. PMI is not displaced.  Exam reveals gallop and S4.    Murmur heard.   Harsh midsystolic murmur is present with a grade of 2/6  at the upper right sternal border  Pulses:       Radial pulses are 2+ on the right side, and 2+ on the left side.        Femoral pulses are 1+ on the right side, and 1+ on the left side.       Dorsalis pedis pulses are 1+ on the right side, and 1+ on the left side.        Posterior tibial pulses are 1+ on the right side, and 1+ on the left side.   Pulmonary/Chest: Effort normal and breath sounds normal.   Abdominal: Soft. Normal appearance. There is no hepatosplenomegaly. There is no tenderness.   Musculoskeletal:        Right ankle: She exhibits no swelling, no ecchymosis and no deformity.        Left ankle: She exhibits no swelling, no ecchymosis and no deformity.   Lymphadenopathy:        Head (right side): No submandibular adenopathy present.        Head (left side): No submandibular adenopathy present.     She has no cervical adenopathy.   Neurological: She is alert and oriented to person, place, and time. She is not disoriented. No cranial nerve deficit.   Skin: Skin is warm, dry and intact. No rash noted. She is not diaphoretic. No cyanosis. Nails show no clubbing.   Psychiatric: She has a normal mood and affect. Her speech is normal and behavior is normal. Judgment and thought content normal. Cognition and memory are normal.     Current Medications:     [START ON 11/28/2017] aspirin  81 mg Oral Daily    clopidogrel  300 mg Oral Once    [START ON 11/28/2017] clopidogrel  75 mg Oral Daily    enoxparin  1 mg/kg Subcutaneous Q12H    famotidine  20 mg Oral BID    [START ON 11/28/2017]  losartan  100 mg Oral Daily    [START ON 11/28/2017] metoprolol succinate  50 mg Oral Daily    [START ON 11/28/2017] oxybutynin  5 mg Oral Daily     Current Laboratory Results:    Recent Results (from the past 24 hour(s))   POCT glucose    Collection Time: 11/27/17  4:54 PM   Result Value Ref Range    POCT Glucose 137 (H) 70 - 110 mg/dL   CBC auto differential    Collection Time: 11/27/17  5:09 PM   Result Value Ref Range    WBC 6.53 3.90 - 12.70 K/uL    RBC 4.29 4.00 - 5.40 M/uL    Hemoglobin 12.8 12.0 - 16.0 g/dL    Hematocrit 40.4 37.0 - 48.5 %    MCV 94 82 - 98 fL    MCH 29.8 27.0 - 31.0 pg    MCHC 31.7 (L) 32.0 - 36.0 g/dL    RDW 13.7 11.5 - 14.5 %    Platelets 200 150 - 350 K/uL    MPV 11.7 9.2 - 12.9 fL    Gran # 3.7 1.8 - 7.7 K/uL    Lymph # 2.0 1.0 - 4.8 K/uL    Mono # 0.7 0.3 - 1.0 K/uL    Eos # 0.2 0.0 - 0.5 K/uL    Baso # 0.03 0.00 - 0.20 K/uL    Gran% 56.4 38.0 - 73.0 %    Lymph% 29.9 18.0 - 48.0 %    Mono% 10.0 4.0 - 15.0 %    Eosinophil% 2.9 0.0 - 8.0 %    Basophil% 0.5 0.0 - 1.9 %    Differential Method Automated    Comprehensive metabolic panel    Collection Time: 11/27/17  5:09 PM   Result Value Ref Range    Sodium 142 136 - 145 mmol/L    Potassium 3.9 3.5 - 5.1 mmol/L    Chloride 103 95 - 110 mmol/L    CO2 33 (H) 23 - 29 mmol/L    Glucose 132 (H) 70 - 110 mg/dL    BUN, Bld 26 (H) 8 - 23 mg/dL    Creatinine 1.0 0.5 - 1.4 mg/dL    Calcium 9.3 8.7 - 10.5 mg/dL    Total Protein 7.4 6.0 - 8.4 g/dL    Albumin 3.2 (L) 3.5 - 5.2 g/dL    Total Bilirubin 0.3 0.1 - 1.0 mg/dL    Alkaline Phosphatase 64 55 - 135 U/L    AST 24 10 - 40 U/L    ALT 23 10 - 44 U/L    Anion Gap 6 (L) 8 - 16 mmol/L    eGFR if African American 59 (A) >60 mL/min/1.73 m^2    eGFR if non African American 51 (A) >60 mL/min/1.73 m^2   Troponin I    Collection Time: 11/27/17  5:09 PM   Result Value Ref Range    Troponin I 1.979 (H) 0.000 - 0.026 ng/mL   Magnesium    Collection Time: 11/27/17  5:09 PM   Result Value Ref Range    Magnesium  1.9 1.6 - 2.6 mg/dL   Urinalysis    Collection Time: 11/27/17  5:45 PM   Result Value Ref Range    Specimen UA Urine, Clean Catch     Color, UA Yellow Yellow, Straw, Lydia    Appearance, UA Clear Clear    pH, UA 6.0 5.0 - 8.0    Specific Gravity, UA 1.015 1.005 - 1.030    Protein, UA Negative Negative    Glucose, UA Negative Negative    Ketones, UA Negative Negative    Bilirubin (UA) Negative Negative    Occult Blood UA Trace (A) Negative    Nitrite, UA Negative Negative    Urobilinogen, UA Negative <2.0 EU/dL    Leukocytes, UA 2+ (A) Negative   Urinalysis Microscopic    Collection Time: 11/27/17  5:45 PM   Result Value Ref Range    RBC, UA 5 (H) 0 - 4 /hpf    WBC, UA 33 (H) 0 - 5 /hpf    Bacteria, UA Rare None-Occ /hpf    Squam Epithel, UA 6 /hpf    Non-Squam Epith 2 (A) <1/hpf /hpf    Hyaline Casts, UA 1 0-1/lpf /lpf    Microscopic Comment SEE COMMENT      Current Imaging Results:    Imaging Results          X-Ray Chest 1 View (Final result)  Result time 11/27/17 17:39:28    Final result by Prateek Juárez MD (11/27/17 17:39:28)                 Impression:      No acute cardiopulmonary process identified.      Electronically signed by: PRATEEK JUÁREZ MD  Date:     11/27/17  Time:    17:39              Narrative:    Chest AP single view.  Comparison: 8/2012, 2/2011, CT 9/2008.    Cardiac silhouette is upper limits of normal in size, noting magnification on the single AP view.  Lungs are slightly hypoinflated which accentuates pulmonary vascular markings.  There is prominence of the left superhilar region likely representing prominent pulmonary vasculature in this region, noting that findings are unchanged since at least 2008.  No convincing evidence of mass lesion in this region.  There is coarse bilateral interstitial attenuation.  No evidence of confluent consolidation, pneumothorax, or significant effusion.  Bones show DJD.                            ECG: NSR. Very subtle ST depression in the inferior  leads.    Assessment and Plan:     1. Coronary Artery Disease   11/27/2017: NSTEMI. Troponin 2.   Very subtle inferior ST changes.   Aspirin.    Load with clopidogrel.   Enoxaparin 1 mg/kg one dose tonight.   NTG patch.   Cath in am. Earlier if further pain.    2. Heart Failure, Diastolic, Chronic              6/14/2013: Echo: Normal LV size and function. Mild aortic valve sclerosis with mild AR.              7/7/2017: Echo: Normal left ventricular size and systolic function. Mild LVH. Moderate diastolic dysfunction. Moderately dilated LA. Mild AS - 1.7 m/s. SPAP 48 mmHg.              Leg edema appears at least partially related to amlodipine.              Well compensated.     3. Aortic Valve Disease              6/14/2013: Echo: Mild aortic valve sclerosis with mild AR.              7/7/2017: Echo: Mild AS - 1.7 m/s.              Stable.     4. Chest Pain              2017: Began experience chest discomfort.              8/9/2017: Stress Echo: 3:00 min. No CP. ECG negative. Echo negative.              Resolved.     5. Carotid Artery Disease              2/3/2011: Left CEA for 80% stenosis with small ulcer. Had mild WILLIAM disease.              3/20/2012: Carotid Duplex: Mild disease.              5/6/2014: Carotid Duplex: WILLIAM: moderate plaquing -1.2 m/s - 50-60%. LICA: moderate plaquing - tortuous - 1.6 m/s - 50-60%.              5/5/2015: Carotid Duplex: Moderate plaquing.               7/7/2017: Carotid Duplex: WILLIAM: Moderate plaquing - 1.3 m/s - 50-60%. LICA: Moderate plaquing - 1.8 m/s - 60-70%.              On aspirin 81 mg Q24              Plan is next Carotid Duplex 7/2018 and yearly.     6. Hypertension              1955: Diagnosed.              Severe hypertension.              On losartan 100 mg Q24, metoprolol 50 mg Q24 and torsemide 10 mg Q24.              Could not tolerate carvedilol or amlodipine.              Fair control overall at home in the past but not followed lately.              Keep log at  home.                7. Hypercholesterolemia              2013: Could not tolerate statin due to severe muscle pains.     8. Mild Obesity              6/27/2017: Weight 82 kg. BMI 31.              Encouraged to lose weight.     9. Primary Care              Dr. Donald Thibodeaux.    The planned procedure was discussed in detail with the patient and the family members present. Risk, benefit and alternatives were reviewed. All questions answered. Consent was then signed.    If further questions or concerns arise the patient was encouraged to contact me prior to the planned procedure.       VTE Risk Mitigation         Ordered     enoxaparin injection 80 mg  Every 12 hours (non-standard times)     Route:  Subcutaneous        11/27/17 1924     Medium Risk of VTE  Once      11/27/17 1923     Low Risk of VTE  Once      11/27/17 1923          Ken Wilson MD  Cardiology   [facility]

## 2017-11-28 NOTE — ED NOTES
Bed rails are up and call light is within patient reach. Family at the bedside. Patient denies needs at this time.

## 2017-11-29 PROBLEM — R55 NEAR SYNCOPE: Status: ACTIVE | Noted: 2017-11-29

## 2017-11-29 LAB
ANION GAP SERPL CALC-SCNC: 7 MMOL/L
ANION GAP SERPL CALC-SCNC: 7 MMOL/L
BASOPHILS # BLD AUTO: 0.02 K/UL
BASOPHILS NFR BLD: 0.2 %
BUN SERPL-MCNC: 17 MG/DL
BUN SERPL-MCNC: 17 MG/DL
CALCIUM SERPL-MCNC: 8.3 MG/DL
CALCIUM SERPL-MCNC: 8.3 MG/DL
CHLORIDE SERPL-SCNC: 107 MMOL/L
CHLORIDE SERPL-SCNC: 107 MMOL/L
CO2 SERPL-SCNC: 22 MMOL/L
CO2 SERPL-SCNC: 22 MMOL/L
CREAT SERPL-MCNC: 0.7 MG/DL
CREAT SERPL-MCNC: 0.7 MG/DL
DIFFERENTIAL METHOD: ABNORMAL
EOSINOPHIL # BLD AUTO: 0 K/UL
EOSINOPHIL NFR BLD: 0 %
ERYTHROCYTE [DISTWIDTH] IN BLOOD BY AUTOMATED COUNT: 13.6 %
EST. GFR  (AFRICAN AMERICAN): >60 ML/MIN/1.73 M^2
EST. GFR  (AFRICAN AMERICAN): >60 ML/MIN/1.73 M^2
EST. GFR  (NON AFRICAN AMERICAN): >60 ML/MIN/1.73 M^2
EST. GFR  (NON AFRICAN AMERICAN): >60 ML/MIN/1.73 M^2
GLUCOSE SERPL-MCNC: 120 MG/DL
GLUCOSE SERPL-MCNC: 120 MG/DL
HCT VFR BLD AUTO: 32.2 %
HGB BLD-MCNC: 10.2 G/DL
LYMPHOCYTES # BLD AUTO: 1.2 K/UL
LYMPHOCYTES NFR BLD: 14.4 %
MCH RBC QN AUTO: 29.4 PG
MCHC RBC AUTO-ENTMCNC: 31.7 G/DL
MCV RBC AUTO: 93 FL
MONOCYTES # BLD AUTO: 0.7 K/UL
MONOCYTES NFR BLD: 8.7 %
NEUTROPHILS # BLD AUTO: 6.5 K/UL
NEUTROPHILS NFR BLD: 76.6 %
PLATELET # BLD AUTO: 176 K/UL
PMV BLD AUTO: 11.6 FL
POTASSIUM SERPL-SCNC: 4.2 MMOL/L
POTASSIUM SERPL-SCNC: 4.2 MMOL/L
RBC # BLD AUTO: 3.47 M/UL
SODIUM SERPL-SCNC: 136 MMOL/L
SODIUM SERPL-SCNC: 136 MMOL/L
TROPONIN I SERPL DL<=0.01 NG/ML-MCNC: 47.63 NG/ML
TROPONIN I SERPL DL<=0.01 NG/ML-MCNC: 48.92 NG/ML
TROPONIN I SERPL DL<=0.01 NG/ML-MCNC: >50 NG/ML
WBC # BLD AUTO: 8.48 K/UL

## 2017-11-29 PROCEDURE — 80048 BASIC METABOLIC PNL TOTAL CA: CPT

## 2017-11-29 PROCEDURE — 36415 COLL VENOUS BLD VENIPUNCTURE: CPT

## 2017-11-29 PROCEDURE — 85025 COMPLETE CBC W/AUTO DIFF WBC: CPT

## 2017-11-29 PROCEDURE — 84484 ASSAY OF TROPONIN QUANT: CPT | Mod: 91

## 2017-11-29 PROCEDURE — 93005 ELECTROCARDIOGRAM TRACING: CPT

## 2017-11-29 PROCEDURE — 11000001 HC ACUTE MED/SURG PRIVATE ROOM

## 2017-11-29 PROCEDURE — 25000003 PHARM REV CODE 250: Performed by: INTERNAL MEDICINE

## 2017-11-29 PROCEDURE — 94761 N-INVAS EAR/PLS OXIMETRY MLT: CPT

## 2017-11-29 PROCEDURE — 25000003 PHARM REV CODE 250: Performed by: EMERGENCY MEDICINE

## 2017-11-29 PROCEDURE — 93010 ELECTROCARDIOGRAM REPORT: CPT | Mod: ,,, | Performed by: INTERNAL MEDICINE

## 2017-11-29 RX ADMIN — HYDROCODONE BITARTRATE AND ACETAMINOPHEN 1 TABLET: 5; 325 TABLET ORAL at 12:11

## 2017-11-29 RX ADMIN — METOPROLOL SUCCINATE 50 MG: 50 TABLET, EXTENDED RELEASE ORAL at 08:11

## 2017-11-29 RX ADMIN — TORSEMIDE 10 MG: 10 TABLET ORAL at 08:11

## 2017-11-29 RX ADMIN — SPIRONOLACTONE 25 MG: 25 TABLET, FILM COATED ORAL at 08:11

## 2017-11-29 RX ADMIN — CLOPIDOGREL 75 MG: 75 TABLET, FILM COATED ORAL at 08:11

## 2017-11-29 RX ADMIN — OXYBUTYNIN CHLORIDE 5 MG: 5 TABLET, EXTENDED RELEASE ORAL at 08:11

## 2017-11-29 RX ADMIN — FAMOTIDINE 20 MG: 20 TABLET, FILM COATED ORAL at 08:11

## 2017-11-29 RX ADMIN — LOSARTAN POTASSIUM 100 MG: 50 TABLET, FILM COATED ORAL at 08:11

## 2017-11-29 RX ADMIN — ASPIRIN 81 MG: 81 TABLET, COATED ORAL at 08:11

## 2017-11-29 NOTE — PLAN OF CARE
Problem: Patient Care Overview  Goal: Plan of Care Review  Outcome: Ongoing (interventions implemented as appropriate)  Sandbag and ice applied to right groin site.  Bruising and thickness (possible hematoma) noted to area.  Attempted to decrease possible hematoma.  Pain med given x 1.  Monitoring continues.

## 2017-11-29 NOTE — NURSING
Femstop removed.  Site severely ecchymotic and reddenned.  Ice pack applied.  Pulses intact.  Monitoring continues.

## 2017-11-29 NOTE — PROGRESS NOTES
[facility]  Cardiology  Progress Note    Patient Name: Debo Ceballos  MRN: [id number]  Admission Date: 11/27/2017  Hospital Length of Stay: 2 days  Code Status: Full Code   Attending Physician: Ken Wilson MD   Primary Care Physician: Ken Wilson MD  Expected Discharge Date:   Principal Problem:Non-ST elevation myocardial infarction (NSTEMI)    Subjective:     Brief HPI:    Debo Ceballos is a 86 y.o. female with long history of difficult to control hypertension and hypercholesterolemia. She is mildly obese. She has severe carotid artery disease and underwent left carotid endarterectomy in 2011. She cannot tolerate statins as that causes severe muscle pains. She could not tolerate carvedilol either as she felt that caused her to get an unsteady gait and upset stomach. She could not tolerate amlodipine as that caused leg edema. She had an MRI for questionable slurred speech in 4/2013 which revealed generalized atrophy. Beginning in 7/2017 after her  passed she experienced occasional mild chest pressure. On 8/9/2017 she had a Stress Echo and was able to do 3:00 minutes without any CP and the ECG was negative as was the Echo.  The chest discomfort resolved.     On 11/27/2017 after visiting her podiatrist she walked out to her car. She fairly suddenly fel a mild chest pressure with a discomfort in the back of her neck. She denies any dyspnea. She felt weak and thought she may pass out. She called for help and was brought to the ER. She had no further chest discomfort. The ECG does not reveal any clear acute changes. Troponin however is elevated to 2.0. She is admitted.    Hospital Course:    [place]28/[place] [facility] [place]: LAD: 80% involving D1. D1 osteal 80%. LV: Normal systolic function. LAD: LM 3.0 x 18 mm. D1: PTCA 2.5 mm.    Interval History:    Weak. Sore right groin. No chest pain or SOB.    Review of Systems   Constitution: Negative for chills, fever, weakness and  malaise/fatigue.   HENT: Negative for nosebleeds.    Eyes: Negative for double vision, vision loss in left eye and vision loss in right eye.   Cardiovascular: Negative for chest pain, claudication, dyspnea on exertion, irregular heartbeat, leg swelling, near-syncope, orthopnea, palpitations, paroxysmal nocturnal dyspnea and syncope.   Respiratory: Negative for cough, hemoptysis, shortness of breath and wheezing.    Endocrine: Negative for cold intolerance and heat intolerance.   Hematologic/Lymphatic: Negative for bleeding problem. Does not bruise/bleed easily.   Skin: Negative for color change and rash.   Musculoskeletal: Positive for back pain. Negative for falls, muscle weakness and myalgias.   Gastrointestinal: Negative for heartburn, hematemesis, hematochezia, hemorrhoids, jaundice, melena, nausea and vomiting.   Genitourinary: Negative for dysuria and hematuria.   Neurological: Negative for dizziness, focal weakness, headaches, light-headedness, loss of balance, numbness and vertigo.   Psychiatric/Behavioral: Negative for altered mental status, depression and memory loss. The patient is not nervous/anxious.    Allergic/Immunologic: Negative for hives and persistent infections.     Objective:     Vital Signs (Most Recent):  Temp: 98.6 °F (37 °C) (11/29/17 0300)  Pulse: 66 (11/29/17 0700)  Resp: (!) 23 (11/29/17 0700)  BP: (!) 169/73 (11/29/17 0700)  SpO2: 97 % (11/29/17 0700) Vital Signs (24h Range):  Temp:  [97.7 °F (36.5 °C)-98.6 °F (37 °C)] 98.6 °F (37 °C)  Pulse:  [54-66] 66  Resp:  [11-25] 23  SpO2:  [94 %-98 %] 97 %  BP: (118-181)/(58-74) 169/73  Arterial Line BP: (172-186)/(60-68) 172/60     Weight: 83.5 kg (184 lb 1.4 oz)  Body mass index is 31.6 kg/m².    SpO2: 97 %  O2 Device (Oxygen Therapy): room air      Intake/Output Summary (Last 24 hours) at 11/29/17 0910  Last data filed at 11/28/17 2200   Gross per 24 hour   Intake           898.33 ml   Output                0 ml   Net           898.33 ml        Lines/Drains/Airways     Peripheral Intravenous Line                 Peripheral IV - Single Lumen 11/27/17 1623 Left Antecubital 1 day                Physical Exam   Constitutional: She is oriented to person, place, and time. She appears well-developed and well-nourished.  Non-toxic appearance. No distress.   HENT:   Head: Normocephalic and atraumatic.   Nose: Nose normal.   Eyes: Right eye exhibits no discharge. Left eye exhibits no discharge. Right conjunctiva is not injected. Left conjunctiva is not injected. Right pupil is round. Left pupil is round. Pupils are equal.   Neck: Neck supple. No JVD present. Carotid bruit is not present. No thyromegaly present.   Cardiovascular: Normal rate, regular rhythm, S1 normal and S2 normal.   No extrasystoles are present. PMI is not displaced.  Exam reveals gallop and S4.    Murmur heard.   Harsh midsystolic murmur is present with a grade of 3/6  at the upper right sternal border  Pulses:       Radial pulses are 2+ on the right side, and 2+ on the left side.        Femoral pulses are 2+ on the right side, and 2+ on the left side.       Dorsalis pedis pulses are 1+ on the right side, and 1+ on the left side.        Posterior tibial pulses are 1+ on the right side, and 1+ on the left side.   Ecchymosis right groin.   Pulmonary/Chest: Effort normal and breath sounds normal.   Abdominal: Soft. Normal appearance. There is no hepatosplenomegaly. There is no tenderness.   Musculoskeletal:        Right ankle: She exhibits no swelling, no ecchymosis and no deformity.        Left ankle: She exhibits no swelling, no ecchymosis and no deformity.   Lymphadenopathy:        Head (right side): No submandibular adenopathy present.        Head (left side): No submandibular adenopathy present.     She has no cervical adenopathy.   Neurological: She is alert and oriented to person, place, and time. She is not disoriented. No cranial nerve deficit.   Skin: Skin is warm, dry and intact. No  rash noted. She is not diaphoretic. No cyanosis. Nails show no clubbing.   Psychiatric: She has a normal mood and affect. Her speech is normal and behavior is normal. Judgment and thought content normal. Cognition and memory are normal.     Current Medications:     aspirin  81 mg Oral Daily    clopidogrel  75 mg Oral Daily    famotidine  20 mg Oral BID    losartan  100 mg Oral Daily    metoprolol succinate  50 mg Oral Daily    oxybutynin  5 mg Oral Daily    spironolactone  25 mg Oral Daily    torsemide  10 mg Oral Daily     Current Laboratory Results:    Recent Results (from the past 24 hour(s))   Troponin I    Collection Time: 11/28/17 12:06 PM   Result Value Ref Range    Troponin I 1.059 (H) 0.000 - 0.026 ng/mL   Troponin I    Collection Time: 11/28/17  7:55 PM   Result Value Ref Range    Troponin I 5.317 (H) 0.000 - 0.026 ng/mL   CBC auto differential    Collection Time: 11/29/17 12:22 AM   Result Value Ref Range    WBC 8.48 3.90 - 12.70 K/uL    RBC 3.47 (L) 4.00 - 5.40 M/uL    Hemoglobin 10.2 (L) 12.0 - 16.0 g/dL    Hematocrit 32.2 (L) 37.0 - 48.5 %    MCV 93 82 - 98 fL    MCH 29.4 27.0 - 31.0 pg    MCHC 31.7 (L) 32.0 - 36.0 g/dL    RDW 13.6 11.5 - 14.5 %    Platelets 176 150 - 350 K/uL    MPV 11.6 9.2 - 12.9 fL    Gran # 6.5 1.8 - 7.7 K/uL    Lymph # 1.2 1.0 - 4.8 K/uL    Mono # 0.7 0.3 - 1.0 K/uL    Eos # 0.0 0.0 - 0.5 K/uL    Baso # 0.02 0.00 - 0.20 K/uL    Gran% 76.6 (H) 38.0 - 73.0 %    Lymph% 14.4 (L) 18.0 - 48.0 %    Mono% 8.7 4.0 - 15.0 %    Eosinophil% 0.0 0.0 - 8.0 %    Basophil% 0.2 0.0 - 1.9 %    Differential Method Automated    Troponin I    Collection Time: 11/29/17 12:22 AM   Result Value Ref Range    Troponin I 48.918 (H) 0.000 - 0.026 ng/mL   Basic metabolic panel    Collection Time: 11/29/17  3:39 AM   Result Value Ref Range    Sodium 136 136 - 145 mmol/L    Potassium 4.2 3.5 - 5.1 mmol/L    Chloride 107 95 - 110 mmol/L    CO2 22 (L) 23 - 29 mmol/L    Glucose 120 (H) 70 - 110 mg/dL     BUN, Bld 17 8 - 23 mg/dL    Creatinine 0.7 0.5 - 1.4 mg/dL    Calcium 8.3 (L) 8.7 - 10.5 mg/dL    Anion Gap 7 (L) 8 - 16 mmol/L    eGFR if African American >60 >60 mL/min/1.73 m^2    eGFR if non African American >60 >60 mL/min/1.73 m^2   Basic metabolic panel    Collection Time: 11/29/17  3:39 AM   Result Value Ref Range    Sodium 136 136 - 145 mmol/L    Potassium 4.2 3.5 - 5.1 mmol/L    Chloride 107 95 - 110 mmol/L    CO2 22 (L) 23 - 29 mmol/L    Glucose 120 (H) 70 - 110 mg/dL    BUN, Bld 17 8 - 23 mg/dL    Creatinine 0.7 0.5 - 1.4 mg/dL    Calcium 8.3 (L) 8.7 - 10.5 mg/dL    Anion Gap 7 (L) 8 - 16 mmol/L    eGFR if African American >60 >60 mL/min/1.73 m^2    eGFR if non African American >60 >60 mL/min/1.73 m^2   Troponin I in a.m.    Collection Time: 11/29/17  3:39 AM   Result Value Ref Range    Troponin I >50.000 (H) 0.000 - 0.026 ng/mL     Current Imaging Results:    Imaging Results          X-Ray Chest 1 View (Final result)  Result time 11/27/17 17:39:28    Final result by Prateek Juárez MD (11/27/17 17:39:28)                 Impression:      No acute cardiopulmonary process identified.      Electronically signed by: PRATEEK JUÁREZ MD  Date:     11/27/17  Time:    17:39              Narrative:    Chest AP single view.  Comparison: 8/2012, 2/2011, CT 9/2008.    Cardiac silhouette is upper limits of normal in size, noting magnification on the single AP view.  Lungs are slightly hypoinflated which accentuates pulmonary vascular markings.  There is prominence of the left superhilar region likely representing prominent pulmonary vasculature in this region, noting that findings are unchanged since at least 2008.  No convincing evidence of mass lesion in this region.  There is coarse bilateral interstitial attenuation.  No evidence of confluent consolidation, pneumothorax, or significant effusion.  Bones show DJD.                              Assessment and Plan:     Problem List:    Active Diagnoses:    Diagnosis  Date Noted POA    PRINCIPAL PROBLEM:  Non-ST elevation myocardial infarction (NSTEMI) [I21.4] 11/27/2017 Yes    Coronary artery disease [I25.10] 11/28/2017 Yes    History of coronary artery stent placement [Z95.5] 11/28/2017 Not Applicable    Heart failure, diastolic, chronic [I50.32] 06/03/2013 Yes    Aortic valve disease [I35.9] 07/04/2013 Yes    Chest pain [R07.9] 06/27/2017 Yes    Carotid artery stenosis [I65.29] 06/03/2013 Yes    Hypertension [I10] 06/03/2013 Yes    Hypercholesterolemia [E78.00] 07/04/2013 Yes      Problems Resolved During this Admission:    Diagnosis Date Noted Date Resolved POA     Assessment and Plan:    1. Coronary Artery Disease              11/27/2017: NSTEMI. Troponin >50.   [place] [facility] [place] LAD: 80% involving D1. D1 osteal 80%. LV: Normal systolic function. LAD: LM 3.0 x 18 mm. D1: PTCA 2.5 mm.              On aspirin 81 mg Q24..               On clopidogrel 75 mg Q24 until 12/2017.                 2. Heart Failure, Diastolic, Chronic              6/14/2013: Echo: Normal LV size and function. Mild aortic valve sclerosis with mild AR.              7/7/2017: Echo: Normal left ventricular size and systolic function. Mild LVH. Moderate diastolic dysfunction. Moderately dilated LA. Mild AS - 1.7 m/s. SPAP 48 mmHg.              Leg edema appeared at least partially related to amlodipine.              Well compensated.     3. Aortic Valve Disease              6/14/2013: Echo: Mild aortic valve sclerosis with mild AR.              7/7/2017: Echo: Mild AS - 1.7 m/s.              Stable.     4. Chest Pain              2017: Began experience chest discomfort.              8/9/2017: Stress Echo: 3:00 min. No CP. ECG negative. Echo negative.              Resolved.     5. Carotid Artery Disease              2/3/2011: Left CEA for 80% stenosis with small ulcer. Had mild WILLIAM disease.              3/20/2012: Carotid Duplex: Mild disease.              5/6/2014: Carotid Duplex: WILLIAM:  moderate plaquing -1.2 m/s - 50-60%. LICA: moderate plaquing - tortuous - 1.6 m/s - 50-60%.              5/5/2015: Carotid Duplex: Moderate plaquing.               7/7/2017: Carotid Duplex: WILLIAM: Moderate plaquing - 1.3 m/s - 50-60%. LICA: Moderate plaquing - 1.8 m/s - 60-70%.              On aspirin 81 mg Q24              Plan is next Carotid Duplex 7/2018 and yearly.     6. Hypertension              1955: Diagnosed.              Severe hypertension.              On losartan 100 mg Q24, metoprolol 50 mg Q24 and torsemide 10 mg Q24.              Could not tolerate carvedilol or amlodipine.              Fair control overall at home in the past but not followed lately.              Keep log at home.   Added spironolactone 25 mg Q24.     7. Hypercholesterolemia              2013: Could not tolerate statin due to severe muscle pains.   May try again as outpatient.     8. Mild Obesity              6/27/2017: Weight 82 kg. BMI 31.              Encouraged to lose weight.     9. Primary Care              Dr. Donald Thibodeaux.    10. Disposition   To telemetry for ambulation.    VTE Risk Mitigation         Ordered     Medium Risk of VTE  Once      11/27/17 1923     Low Risk of VTE  Once      11/27/17 1923          Ken Wilson MD  Cardiology  [facility]

## 2017-11-29 NOTE — PROGRESS NOTES
[facility]  Cardiology  Progress Note    Patient Name: Debo Ceballos  MRN: [id number]  Admission Date: 11/27/2017  Hospital Length of Stay: 1 days  Code Status: Full Code   Attending Physician: Ken Wilson MD   Primary Care Physician: Ken Wilson MD  Expected Discharge Date:   Principal Problem:Non-ST elevation myocardial infarction (NSTEMI)    Subjective:     Brief HPI:    Debo Ceballos is a 86 y.o. female with long history of difficult to control hypertension and hypercholesterolemia. She is mildly obese. She has severe carotid artery disease and underwent left carotid endarterectomy in 2011. She cannot tolerate statins as that causes severe muscle pains. She could not tolerate carvedilol either as she felt that caused her to get an unsteady gait and upset stomach. She could not tolerate amlodipine as that caused leg edema. She had an MRI for questionable slurred speech in 4/2013 which revealed generalized atrophy. Beginning in 7/2017 after her  passed she experienced occasional mild chest pressure. On 8/9/2017 she had a Stress Echo and was able to do 3:00 minutes without any CP and the ECG was negative as was the Echo.  The chest discomfort resolved.     On 11/27/2017 after visiting her podiatrist she walked out to her car. She fairly suddenly fel a mild chest pressure with a discomfort in the back of her neck. She denies any dyspnea. She felt weak and thought she may pass out. She called for help and was brought to the ER. She had no further chest discomfort. The ECG does not reveal any clear acute changes. Troponin however is elevated to 2.0. She is admitted.    Hospital Course:    11/28/2017: [facility]: Cath: LAD: 80% involving D1. D1 osteal 80%. LV: Normal systolic function. LAD: LM 3.0 x 18 mm. D1: PTCA 2.5 mm.    Interval History:    No chest pain or SOB.    Review of Systems   Constitution: Negative for chills, fever, weakness and malaise/fatigue.   HENT: Negative  for nosebleeds.    Eyes: Negative for double vision, vision loss in left eye and vision loss in right eye.   Cardiovascular: Positive for chest pain. Negative for claudication, dyspnea on exertion, irregular heartbeat, leg swelling, near-syncope, orthopnea, palpitations, paroxysmal nocturnal dyspnea and syncope.   Respiratory: Negative for cough, hemoptysis, shortness of breath and wheezing.    Endocrine: Negative for cold intolerance and heat intolerance.   Hematologic/Lymphatic: Negative for bleeding problem. Does not bruise/bleed easily.   Skin: Negative for color change and rash.   Musculoskeletal: Positive for back pain. Negative for falls, muscle weakness and myalgias.   Gastrointestinal: Negative for heartburn, hematemesis, hematochezia, hemorrhoids, jaundice, melena, nausea and vomiting.   Genitourinary: Negative for dysuria and hematuria.   Neurological: Negative for dizziness, focal weakness, headaches, light-headedness, loss of balance, numbness and vertigo.   Psychiatric/Behavioral: Negative for altered mental status, depression and memory loss. The patient is not nervous/anxious.    Allergic/Immunologic: Negative for hives and persistent infections.     Objective:     Vital Signs (Most Recent):  Temp: 97.9 °F (36.6 °C) (11/28/17 1515)  Pulse: (!) 54 (11/28/17 1700)  Resp: 13 (11/28/17 1700)  BP: (!) 157/70 (11/28/17 1700)  SpO2: 98 % (11/28/17 1700) Vital Signs (24h Range):  Temp:  [96.3 °F (35.7 °C)-98 °F (36.7 °C)] 97.9 °F (36.6 °C)  Pulse:  [50-61] 54  Resp:  [12-25] 13  SpO2:  [95 %-99 %] 98 %  BP: (125-185)/(58-77) 157/70  Arterial Line BP: (172-186)/(60-68) 172/60     Weight: 83.5 kg (184 lb 1.4 oz)  Body mass index is 31.6 kg/m².    SpO2: 98 %  O2 Device (Oxygen Therapy): nasal cannula    No intake or output data in the 24 hours ending 11/28/17 1853    Lines/Drains/Airways     Peripheral Intravenous Line                 Peripheral IV - Single Lumen 11/27/17 1623 Left Antecubital 1 day                 Physical Exam   Constitutional: She is oriented to person, place, and time. She appears well-developed and well-nourished.  Non-toxic appearance. No distress.   HENT:   Head: Normocephalic and atraumatic.   Nose: Nose normal.   Eyes: Right eye exhibits no discharge. Left eye exhibits no discharge. Right conjunctiva is not injected. Left conjunctiva is not injected. Right pupil is round. Left pupil is round. Pupils are equal.   Neck: Neck supple. No JVD present. Carotid bruit is not present. No thyromegaly present.   Cardiovascular: Normal rate, regular rhythm, S1 normal and S2 normal.   No extrasystoles are present. PMI is not displaced.  Exam reveals gallop and S4.    Murmur heard.   Harsh midsystolic murmur is present with a grade of 3/6  at the upper right sternal border  Pulses:       Radial pulses are 2+ on the right side, and 2+ on the left side.        Femoral pulses are 2+ on the right side, and 2+ on the left side.       Dorsalis pedis pulses are 1+ on the right side, and 1+ on the left side.        Posterior tibial pulses are 1+ on the right side, and 1+ on the left side.   Pulmonary/Chest: Effort normal and breath sounds normal.   Abdominal: Soft. Normal appearance. There is no hepatosplenomegaly. There is no tenderness.   Musculoskeletal:        Right ankle: She exhibits no swelling, no ecchymosis and no deformity.        Left ankle: She exhibits no swelling, no ecchymosis and no deformity.   Lymphadenopathy:        Head (right side): No submandibular adenopathy present.        Head (left side): No submandibular adenopathy present.     She has no cervical adenopathy.   Neurological: She is alert and oriented to person, place, and time. She is not disoriented. No cranial nerve deficit.   Skin: Skin is warm, dry and intact. No rash noted. She is not diaphoretic. No cyanosis. Nails show no clubbing.   Psychiatric: She has a normal mood and affect. Her speech is normal and behavior is normal. Judgment  and thought content normal. Cognition and memory are normal.     Current Medications:     aspirin  81 mg Oral Daily    clopidogrel  75 mg Oral Daily    famotidine  20 mg Oral BID    losartan  100 mg Oral Daily    metoprolol succinate  50 mg Oral Daily    nitroGLYCERIN 0.4 MG/HR TD PT24  1 patch Transdermal Daily    oxybutynin  5 mg Oral Daily     Current Laboratory Results:    Recent Results (from the past 24 hour(s))   Troponin I    Collection Time: 11/27/17 11:13 PM   Result Value Ref Range    Troponin I 4.668 (H) 0.000 - 0.026 ng/mL   Basic metabolic panel    Collection Time: 11/28/17  4:24 AM   Result Value Ref Range    Sodium 140 136 - 145 mmol/L    Potassium 3.5 3.5 - 5.1 mmol/L    Chloride 106 95 - 110 mmol/L    CO2 28 23 - 29 mmol/L    Glucose 100 70 - 110 mg/dL    BUN, Bld 25 (H) 8 - 23 mg/dL    Creatinine 0.8 0.5 - 1.4 mg/dL    Calcium 8.4 (L) 8.7 - 10.5 mg/dL    Anion Gap 6 (L) 8 - 16 mmol/L    eGFR if African American >60 >60 mL/min/1.73 m^2    eGFR if non African American >60 >60 mL/min/1.73 m^2   Cath lab procedure    Collection Time: 11/28/17  9:00 AM   Result Value Ref Range    Coronary Stenosis >= 50% (A)     Coronary Stent Yes (A)    Troponin I    Collection Time: 11/28/17 12:06 PM   Result Value Ref Range    Troponin I 1.059 (H) 0.000 - 0.026 ng/mL     Current Imaging Results:    Imaging Results          X-Ray Chest 1 View (Final result)  Result time 11/27/17 17:39:28    Final result by Prateek Juárez MD (11/27/17 17:39:28)                 Impression:      No acute cardiopulmonary process identified.      Electronically signed by: PRATEEK JUÁREZ MD  Date:     11/27/17  Time:    17:39              Narrative:    Chest AP single view.  Comparison: 8/2012, 2/2011, CT 9/2008.    Cardiac silhouette is upper limits of normal in size, noting magnification on the single AP view.  Lungs are slightly hypoinflated which accentuates pulmonary vascular markings.  There is prominence of the left  superhilar region likely representing prominent pulmonary vasculature in this region, noting that findings are unchanged since at least 2008.  No convincing evidence of mass lesion in this region.  There is coarse bilateral interstitial attenuation.  No evidence of confluent consolidation, pneumothorax, or significant effusion.  Bones show DJD.                              Assessment and Plan:     Problem List:    Active Diagnoses:    Diagnosis Date Noted POA    PRINCIPAL PROBLEM:  Non-ST elevation myocardial infarction (NSTEMI) [I21.4] 11/27/2017 Yes    Coronary artery disease [I25.10] 11/28/2017 Yes    History of coronary artery stent placement [Z95.5] 11/28/2017 Not Applicable    Heart failure, diastolic, chronic [I50.32] 06/03/2013 Yes    Aortic valve disease [I35.9] 07/04/2013 Yes    Chest pain [R07.9] 06/27/2017 Yes    Carotid artery stenosis [I65.29] 06/03/2013 Yes    Hypertension [I10] 06/03/2013 Yes    Hypercholesterolemia [E78.00] 07/04/2013 Yes      Problems Resolved During this Admission:    Diagnosis Date Noted Date Resolved POA     Assessment and Plan:    1. Coronary Artery Disease              11/27/2017: NSTEMI. Troponin 5.   11/28/2017: [facility] [place]: Cath: LAD: 80% involving D1. D1 osteal 80%. LV: Normal systolic function. LAD: LM 3.0 x 18 mm. D1: PTCA 2.5 mm.              On aspirin 81 mg Q24..               On clopidogrel 75 mg Q24 until 12/2017.                 2. Heart Failure, Diastolic, Chronic              6/14/2013: Echo: Normal LV size and function. Mild aortic valve sclerosis with mild AR.              7/7/2017: Echo: Normal left ventricular size and systolic function. Mild LVH. Moderate diastolic dysfunction. Moderately dilated LA. Mild AS - 1.7 m/s. SPAP 48 mmHg.              Leg edema appears at least partially related to amlodipine.              Well compensated.     3. Aortic Valve Disease              6/14/2013: Echo: Mild aortic valve sclerosis with mild AR.              7/7/2017:  Echo: Mild AS - 1.7 m/s.              Stable.     4. Chest Pain              2017: Began experience chest discomfort.              8/9/2017: Stress Echo: 3:00 min. No CP. ECG negative. Echo negative.              Resolved.     5. Carotid Artery Disease              2/3/2011: Left CEA for 80% stenosis with small ulcer. Had mild WILLIAM disease.              3/20/2012: Carotid Duplex: Mild disease.              5/6/2014: Carotid Duplex: WILLIAM: moderate plaquing -1.2 m/s - 50-60%. LICA: moderate plaquing - tortuous - 1.6 m/s - 50-60%.              5/5/2015: Carotid Duplex: Moderate plaquing.               7/7/2017: Carotid Duplex: WILLIAM: Moderate plaquing - 1.3 m/s - 50-60%. LICA: Moderate plaquing - 1.8 m/s - 60-70%.              On aspirin 81 mg Q24              Plan is next Carotid Duplex 7/2018 and yearly.     6. Hypertension              1955: Diagnosed.              Severe hypertension.              On losartan 100 mg Q24, metoprolol 50 mg Q24 and torsemide 10 mg Q24.              Could not tolerate carvedilol or amlodipine.              Fair control overall at home in the past but not followed lately.              Keep log at home.   Will add spironolactone 25 mg Q24.     7. Hypercholesterolemia              2013: Could not tolerate statin due to severe muscle pains.   May try again as outpatient.     8. Mild Obesity              6/27/2017: Weight 82 kg. BMI 31.              Encouraged to lose weight.     9. Primary Care              Dr. Donald Thibodeaux.    VTE Risk Mitigation         Ordered     Medium Risk of VTE  Once      11/27/17 1923     Low Risk of VTE  Once      11/27/17 1923          Ken Wilson MD  Cardiology  [facility]

## 2017-11-30 VITALS
RESPIRATION RATE: 16 BRPM | SYSTOLIC BLOOD PRESSURE: 133 MMHG | TEMPERATURE: 98 F | WEIGHT: 184.06 LBS | OXYGEN SATURATION: 96 % | HEIGHT: 64 IN | DIASTOLIC BLOOD PRESSURE: 67 MMHG | HEART RATE: 78 BPM | BODY MASS INDEX: 31.42 KG/M2

## 2017-11-30 LAB
ANION GAP SERPL CALC-SCNC: 6 MMOL/L
BUN SERPL-MCNC: 20 MG/DL
CALCIUM SERPL-MCNC: 8.1 MG/DL
CHLORIDE SERPL-SCNC: 106 MMOL/L
CO2 SERPL-SCNC: 25 MMOL/L
CORONARY STENOSIS: ABNORMAL
CORONARY STENT: YES
CREAT SERPL-MCNC: 0.8 MG/DL
EST. GFR  (AFRICAN AMERICAN): >60 ML/MIN/1.73 M^2
EST. GFR  (NON AFRICAN AMERICAN): >60 ML/MIN/1.73 M^2
GLUCOSE SERPL-MCNC: 99 MG/DL
POTASSIUM SERPL-SCNC: 3.7 MMOL/L
SODIUM SERPL-SCNC: 137 MMOL/L

## 2017-11-30 PROCEDURE — 80048 BASIC METABOLIC PNL TOTAL CA: CPT

## 2017-11-30 PROCEDURE — 93010 ELECTROCARDIOGRAM REPORT: CPT | Mod: ,,, | Performed by: INTERNAL MEDICINE

## 2017-11-30 PROCEDURE — 25000003 PHARM REV CODE 250: Performed by: EMERGENCY MEDICINE

## 2017-11-30 PROCEDURE — 25000003 PHARM REV CODE 250: Performed by: INTERNAL MEDICINE

## 2017-11-30 PROCEDURE — 36415 COLL VENOUS BLD VENIPUNCTURE: CPT

## 2017-11-30 PROCEDURE — 93005 ELECTROCARDIOGRAM TRACING: CPT

## 2017-11-30 PROCEDURE — 97161 PT EVAL LOW COMPLEX 20 MIN: CPT

## 2017-11-30 RX ORDER — SPIRONOLACTONE 25 MG/1
25 TABLET ORAL DAILY
Qty: 90 TABLET | Refills: 3 | Status: SHIPPED | OUTPATIENT
Start: 2017-11-30 | End: 2018-03-21 | Stop reason: SDUPTHER

## 2017-11-30 RX ORDER — CLOPIDOGREL BISULFATE 75 MG/1
75 TABLET ORAL DAILY
Qty: 90 TABLET | Refills: 3 | Status: SHIPPED | OUTPATIENT
Start: 2017-11-30 | End: 2018-03-21 | Stop reason: SDUPTHER

## 2017-11-30 RX ADMIN — ASPIRIN 81 MG: 81 TABLET, COATED ORAL at 08:11

## 2017-11-30 RX ADMIN — LOSARTAN POTASSIUM 100 MG: 50 TABLET, FILM COATED ORAL at 08:11

## 2017-11-30 RX ADMIN — SPIRONOLACTONE 25 MG: 25 TABLET, FILM COATED ORAL at 08:11

## 2017-11-30 RX ADMIN — OXYBUTYNIN CHLORIDE 5 MG: 5 TABLET, EXTENDED RELEASE ORAL at 08:11

## 2017-11-30 RX ADMIN — TORSEMIDE 10 MG: 10 TABLET ORAL at 08:11

## 2017-11-30 RX ADMIN — FAMOTIDINE 20 MG: 20 TABLET, FILM COATED ORAL at 08:11

## 2017-11-30 RX ADMIN — METOPROLOL SUCCINATE 50 MG: 50 TABLET, EXTENDED RELEASE ORAL at 08:11

## 2017-11-30 RX ADMIN — CLOPIDOGREL 75 MG: 75 TABLET, FILM COATED ORAL at 08:11

## 2017-11-30 NOTE — PROGRESS NOTES
[facility]  Cardiology  Progress Note    Patient Name: Debo Ceballos  MRN: [id number]  Admission Date: 11/27/2017  Hospital Length of Stay: 3 days  Code Status: Full Code   Attending Physician: Ken Wilson MD   Primary Care Physician: Ken Wilson MD  Expected Discharge Date:   Principal Problem:Non-ST elevation myocardial infarction (NSTEMI)    Subjective:     Brief HPI:    Debo Ceballos is a 86 y.o. female with long history of difficult to control hypertension and hypercholesterolemia. She is mildly obese. She has severe carotid artery disease and underwent left carotid endarterectomy in 2011. She cannot tolerate statins as that causes severe muscle pains. She could not tolerate carvedilol either as she felt that caused her to get an unsteady gait and upset stomach. She could not tolerate amlodipine as that caused leg edema. She had an MRI for questionable slurred speech in 4/2013 which revealed generalized atrophy. Beginning in 7/2017 after her  passed she experienced occasional mild chest pressure. On 8/9/2017 she had a Stress Echo and was able to do 3:00 minutes without any CP and the ECG was negative as was the Echo.  The chest discomfort resolved.     On 11/27/2017 after visiting her podiatrist she walked out to her car. She fairly suddenly fel a mild chest pressure with a discomfort in the back of her neck. She denies any dyspnea. She felt weak and thought she may pass out. She called for help and was brought to the ER. She had no further chest discomfort. The ECG does not reveal any clear acute changes. Troponin however is elevated to 2.0. She is admitted.    Hospital Course:    11/28/2017: [facility] [place]: Cath: LAD: 80% involving D1. D1 osteal 80%. LV: Normal systolic function. LAD: LM 3.0 x 18 mm. D1: PTCA 2.5 mm.    Interval History:    Been ambulating. No chest pain or SOB. Some soreness right groin.    Review of Systems   Constitution: Negative for chills, fever,  weakness and malaise/fatigue.   HENT: Negative for nosebleeds.    Eyes: Negative for double vision, vision loss in left eye and vision loss in right eye.   Cardiovascular: Negative for chest pain, claudication, dyspnea on exertion, irregular heartbeat, leg swelling, near-syncope, orthopnea, palpitations, paroxysmal nocturnal dyspnea and syncope.   Respiratory: Negative for cough, hemoptysis, shortness of breath and wheezing.    Endocrine: Negative for cold intolerance and heat intolerance.   Hematologic/Lymphatic: Negative for bleeding problem. Does not bruise/bleed easily.   Skin: Negative for color change and rash.   Musculoskeletal: Positive for back pain. Negative for falls, muscle weakness and myalgias.   Gastrointestinal: Negative for heartburn, hematemesis, hematochezia, hemorrhoids, jaundice, melena, nausea and vomiting.   Genitourinary: Negative for dysuria and hematuria.   Neurological: Negative for dizziness, focal weakness, headaches, light-headedness, loss of balance, numbness and vertigo.   Psychiatric/Behavioral: Negative for altered mental status, depression and memory loss. The patient is not nervous/anxious.    Allergic/Immunologic: Negative for hives and persistent infections.     Objective:     Vital Signs (Most Recent):  Temp: 98.6 °F (37 °C) (11/30/17 0424)  Pulse: 83 (11/30/17 0600)  Resp: 20 (11/30/17 0424)  BP: 134/61 (11/30/17 0424)  SpO2: (!) 94 % (11/30/17 0424) Vital Signs (24h Range):  Temp:  [98 °F (36.7 °C)-98.6 °F (37 °C)] 98.6 °F (37 °C)  Pulse:  [64-83] 83  Resp:  [16-23] 20  SpO2:  [94 %-98 %] 94 %  BP: (121-169)/(56-73) 134/61     Weight: 83.5 kg (184 lb 1.4 oz)  Body mass index is 31.6 kg/m².    SpO2: (!) 94 %  O2 Device (Oxygen Therapy): room air    No intake or output data in the 24 hours ending 11/30/17 0649    Lines/Drains/Airways     Peripheral Intravenous Line                 Peripheral IV - Single Lumen 11/27/17 1623 Left Antecubital 2 days                Physical Exam    Constitutional: She is oriented to person, place, and time. She appears well-developed and well-nourished.  Non-toxic appearance. No distress.   HENT:   Head: Normocephalic and atraumatic.   Nose: Nose normal.   Eyes: Right eye exhibits no discharge. Left eye exhibits no discharge. Right conjunctiva is not injected. Left conjunctiva is not injected. Right pupil is round. Left pupil is round. Pupils are equal.   Neck: Neck supple. No JVD present. Carotid bruit is not present. No thyromegaly present.   Cardiovascular: Normal rate, regular rhythm, S1 normal and S2 normal.   No extrasystoles are present. PMI is not displaced.  Exam reveals gallop and S4.    Murmur heard.   Harsh midsystolic murmur is present with a grade of 3/6  at the upper right sternal border  Pulses:       Radial pulses are 2+ on the right side, and 2+ on the left side.        Femoral pulses are 2+ on the right side, and 2+ on the left side.       Dorsalis pedis pulses are 1+ on the right side, and 1+ on the left side.        Posterior tibial pulses are 1+ on the right side, and 1+ on the left side.   Ecchymosis right groin.   Pulmonary/Chest: Effort normal and breath sounds normal.   Abdominal: Soft. Normal appearance. There is no hepatosplenomegaly. There is no tenderness.   Musculoskeletal:        Right ankle: She exhibits no swelling, no ecchymosis and no deformity.        Left ankle: She exhibits no swelling, no ecchymosis and no deformity.   Lymphadenopathy:        Head (right side): No submandibular adenopathy present.        Head (left side): No submandibular adenopathy present.     She has no cervical adenopathy.   Neurological: She is alert and oriented to person, place, and time. She is not disoriented. No cranial nerve deficit.   Skin: Skin is warm, dry and intact. No rash noted. She is not diaphoretic. No cyanosis. Nails show no clubbing.   Psychiatric: She has a normal mood and affect. Her speech is normal and behavior is normal.  Judgment and thought content normal. Cognition and memory are normal.     Current Medications:     aspirin  81 mg Oral Daily    clopidogrel  75 mg Oral Daily    famotidine  20 mg Oral BID    losartan  100 mg Oral Daily    metoprolol succinate  50 mg Oral Daily    oxybutynin  5 mg Oral Daily    spironolactone  25 mg Oral Daily    torsemide  10 mg Oral Daily     Current Laboratory Results:    Recent Results (from the past 24 hour(s))   Troponin I    Collection Time: 11/29/17  8:33 AM   Result Value Ref Range    Troponin I 47.628 (H) 0.000 - 0.026 ng/mL   Basic metabolic panel    Collection Time: 11/30/17  4:22 AM   Result Value Ref Range    Sodium 137 136 - 145 mmol/L    Potassium 3.7 3.5 - 5.1 mmol/L    Chloride 106 95 - 110 mmol/L    CO2 25 23 - 29 mmol/L    Glucose 99 70 - 110 mg/dL    BUN, Bld 20 8 - 23 mg/dL    Creatinine 0.8 0.5 - 1.4 mg/dL    Calcium 8.1 (L) 8.7 - 10.5 mg/dL    Anion Gap 6 (L) 8 - 16 mmol/L    eGFR if African American >60 >60 mL/min/1.73 m^2    eGFR if non African American >60 >60 mL/min/1.73 m^2     Current Imaging Results:    Imaging Results          X-Ray Chest 1 View (Final result)  Result time 11/27/17 17:39:28    Final result by Prateek Juárez MD (11/27/17 17:39:28)                 Impression:      No acute cardiopulmonary process identified.      Electronically signed by: PRATEEK JUÁREZ MD  Date:     11/27/17  Time:    17:39              Narrative:    Chest AP single view.  Comparison: 8/2012, 2/2011, CT 9/2008.    Cardiac silhouette is upper limits of normal in size, noting magnification on the single AP view.  Lungs are slightly hypoinflated which accentuates pulmonary vascular markings.  There is prominence of the left superhilar region likely representing prominent pulmonary vasculature in this region, noting that findings are unchanged since at least 2008.  No convincing evidence of mass lesion in this region.  There is coarse bilateral interstitial attenuation.  No  evidence of confluent consolidation, pneumothorax, or significant effusion.  Bones show DJD.                              Assessment and Plan:     Problem List:    Active Diagnoses:    Diagnosis Date Noted POA    PRINCIPAL PROBLEM:  Non-ST elevation myocardial infarction (NSTEMI) [I21.4] 11/27/2017 Yes    Coronary artery disease [I25.10] 11/28/2017 Yes    History of coronary artery stent placement [Z95.5] 11/28/2017 Not Applicable    Heart failure, diastolic, chronic [I50.32] 06/03/2013 Yes    Aortic valve disease [I35.9] 07/04/2013 Yes    Chest pain [R07.9] 06/27/2017 Yes    Carotid artery stenosis [I65.29] 06/03/2013 Yes    Hypertension [I10] 06/03/2013 Yes    Hypercholesterolemia [E78.00] 07/04/2013 Yes    Near syncope [R55] 11/29/2017 Yes      Problems Resolved During this Admission:    Diagnosis Date Noted Date Resolved POA     Assessment and Plan:    1. Coronary Artery Disease              11/27/2017: NSTEMI. Troponin >50.   11/28/2017: [facility] [place]: Cath: LAD: 80% involving D1. D1 osteal 80%. LV: Normal systolic function. LAD: LM 3.0 x 18 mm. D1: PTCA 2.5 mm.              On aspirin 81 mg Q24.              On clopidogrel 75 mg Q24 until 12/2018.                 2. Heart Failure, Diastolic, Chronic              6/14/2013: Echo: Normal LV size and function. Mild aortic valve sclerosis with mild AR.              7/7/2017: Echo: Normal left ventricular size and systolic function. Mild LVH. Moderate diastolic dysfunction. Moderately dilated LA. Mild AS - 1.7 m/s. SPAP 48 mmHg.              Leg edema appeared at least partially related to amlodipine.              Well compensated.     3. Aortic Valve Disease              6/14/2013: Echo: Mild aortic valve sclerosis with mild AR.              7/7/2017: Echo: Mild AS - 1.7 m/s.              Stable.     4. Chest Pain              2017: Began experience chest discomfort.              8/9/2017: Stress Echo: 3:00 min. No CP. ECG negative. Echo negative.               Resolved.     5. Carotid Artery Disease              2/3/2011: Left CEA for 80% stenosis with small ulcer. Had mild WILLIAM disease.              3/20/2012: Carotid Duplex: Mild disease.              5/6/2014: Carotid Duplex: WILLIAM: moderate plaquing -1.2 m/s - 50-60%. LICA: moderate plaquing - tortuous - 1.6 m/s - 50-60%.              5/5/2015: Carotid Duplex: Moderate plaquing.               7/7/2017: Carotid Duplex: WILLIAM: Moderate plaquing - 1.3 m/s - 50-60%. LICA: Moderate plaquing - 1.8 m/s - 60-70%.              On aspirin 81 mg Q24              Plan is next Carotid Duplex 7/2018 and yearly.     6. Hypertension              1955: Diagnosed.              Severe hypertension.              On losartan 100 mg Q24, metoprolol 50 mg Q24 and torsemide 10 mg Q24.              Could not tolerate carvedilol or amlodipine.              Fair control overall at home in the past but not followed lately.              Keep log at home.   Added spironolactone 25 mg Q24.     7. Hypercholesterolemia              2013: Could not tolerate statin due to severe muscle pains.   May try again as outpatient.     8. Mild Obesity              6/27/2017: Weight 82 kg. BMI 31.              Encouraged to lose weight.     9. Primary Care              Dr. Donald Thibodeaux.    10. Disposition   Home.    VTE Risk Mitigation         Ordered     Medium Risk of VTE  Once      11/27/17 1923     Low Risk of VTE  Once      11/27/17 1923          Ken Wilson MD  Cardiology  [facility]

## 2017-11-30 NOTE — PLAN OF CARE
Problem: Physical Therapy Goal  Goal: Physical Therapy Goal  Outcome: Outcome(s) achieved Date Met: 11/30/17  Evaluation completed, pt does not have any further PT needs at this time.

## 2017-11-30 NOTE — DISCHARGE SUMMARY
[facility]  Cardiology  Discharge Summary      Patient Name: Debo Ceballos  MRN: [id number]  Admission Date: 11/27/2017  Hospital Length of Stay: 3 days  Discharge Date and Time:  11/30/2017 7:02 AM  Attending Physician: Ken Wilson MD  Discharging Provider: Ken Wilson MD  Primary Care Physician: Ken Wilson MD    Brief HPI:     Debo Ceballos is a 86 y.o. female with long history of difficult to control hypertension and hypercholesterolemia. She is mildly obese. She has severe carotid artery disease and underwent left carotid endarterectomy in 2011. She cannot tolerate statins as that causes severe muscle pains. She could not tolerate carvedilol either as she felt that caused her to get an unsteady gait and upset stomach. She could not tolerate amlodipine as that caused leg edema. She had an MRI for questionable slurred speech in 4/2013 which revealed generalized atrophy. Beginning in 7/2017 after her  passed she experienced occasional mild chest pressure. On 8/9/2017 she had a Stress Echo and was able to do 3:00 minutes without any CP and the ECG was negative as was the Echo.  The chest discomfort resolved.     On 11/27/2017 after visiting her podiatrist she walked out to her car. She fairly suddenly felt a mild chest pressure with a discomfort in the back of her neck. She denied any dyspnea. She felt weak and thought she may pass out. She called for help and was brought to the ER. She had no further chest discomfort. The ECG did not reveal any clear acute changes. Troponin however was elevated to 2.0. She was admitted.     Hospital Course:     11/28/2017: [facility] [place]: Cath: LAD: 80% involving D1. D1 osteal 80%. LV: Normal systolic function. LAD: LM 3.0 x 18 mm. D1: PTCA 2.5 mm.     She felt well following the intervention. Troponin level vanna to >50 following the intervention witch was not expected and felt to possibly be related to reperfusion issues. There were no  wall motion abnormality seen. She had some ecchymosis in the right groin. Due to a high blood pressure spironolactone was addded to her antihypertensive regimen. She did not want to try a statin. The importance of DAPT was stressed.      Assessment and Plan:     1. Coronary Artery Disease              11/27/2017: NSTEMI. Troponin >50.              11/28/2017: [facility] [place]: Cath: LAD: 80% involving D1. D1 osteal 80%. LV: Normal systolic function. LAD: LM 3.0 x 18 mm. D1: PTCA 2.5 mm.              On aspirin 81 mg Q24.              On clopidogrel 75 mg Q24 until 12/2018.     2. Heart Failure, Diastolic, Chronic              6/14/2013: Echo: Normal LV size and function. Mild aortic valve sclerosis with mild AR.              7/7/2017: Echo: Normal left ventricular size and systolic function. Mild LVH. Moderate diastolic dysfunction. Moderately dilated LA. Mild AS - 1.7 m/s. SPAP 48 mmHg.   On torsemide 10 mg Q24 and spironolactone 25 mg Q24.              Leg edema appeared at least partially related to amlodipine.              Well compensated.     3. Aortic Valve Disease              6/14/2013: Echo: Mild aortic valve sclerosis with mild AR.              7/7/2017: Echo: Mild AS - 1.7 m/s.              Stable.     4. Chest Pain              2017: Began experience chest discomfort.              8/9/2017: Stress Echo: 3:00 min. No CP. ECG negative. Echo negative.              Resolved.     5. Carotid Artery Disease              2/3/2011: Left CEA for 80% stenosis with small ulcer. Had mild WILLIAM disease.              3/20/2012: Carotid Duplex: Mild disease.              5/6/2014: Carotid Duplex: WILLIAM: moderate plaquing -1.2 m/s - 50-60%. LICA: moderate plaquing - tortuous - 1.6 m/s - 50-60%.              5/5/2015: Carotid Duplex: Moderate plaquing.               7/7/2017: Carotid Duplex: WILLIAM: Moderate plaquing - 1.3 m/s - 50-60%. LICA: Moderate plaquing - 1.8 m/s - 60-70%.              On aspirin 81 mg Q24              Plan is  next Carotid Duplex 7/2018 and yearly.     6. Hypertension              1955: Diagnosed.              Severe hypertension.              On losartan 100 mg Q24, metoprolol 50 mg Q24, spironolactone 25 mg Q24 and torsemide 10 mg Q24.              Could not tolerate carvedilol or amlodipine.              Fair control overall at home in the past but high during the hospital stay.              Keep log at home.      7. Hypercholesterolemia              2013: Could not tolerate statin due to severe muscle pains.              May try again as outpatient.     8. Mild Obesity              6/27/2017: Weight 82 kg. BMI 31.              Encouraged to lose weight.     9. Primary Care              Dr. Donald Thibodeaux.     Procedure(s) (LRB):  HEART CATH-LEFT (N/A)     Indwelling Lines/Drains at time of discharge:  Lines/Drains/Airways          No matching active lines, drains, or airways        Significant Diagnostic Studies: Labs:   BMP:   Recent Labs  Lab 11/29/17  0339 11/30/17  0422   *  120* 99     136 137   K 4.2  4.2 3.7     107 106   CO2 22*  22* 25   BUN 17  17 20   CREATININE 0.7  0.7 0.8   CALCIUM 8.3*  8.3* 8.1*   , CMP   Recent Labs  Lab 11/29/17 0339 11/30/17  0422     136 137   K 4.2  4.2 3.7     107 106   CO2 22*  22* 25   *  120* 99   BUN 17  17 20   CREATININE 0.7  0.7 0.8   CALCIUM 8.3*  8.3* 8.1*   ANIONGAP 7*  7* 6*   ESTGFRAFRICA >60  >60 >60   EGFRNONAA >60  >60 >60    and CBC   Recent Labs  Lab 11/29/17  0022   WBC 8.48   HGB 10.2*   HCT 32.2*          Pending Diagnostic Studies:     None          Final Active Diagnoses:    Diagnosis Date Noted POA    PRINCIPAL PROBLEM:  Non-ST elevation myocardial infarction (NSTEMI) [I21.4] 11/27/2017 Yes    Coronary artery disease [I25.10] 11/28/2017 Yes    History of coronary artery stent placement [Z95.5] 11/28/2017 Not Applicable    Heart failure, diastolic, chronic [I50.32] 06/03/2013 Yes    Aortic  valve disease [I35.9] 07/04/2013 Yes    Chest pain [R07.9] 06/27/2017 Yes    Carotid artery stenosis [I65.29] 06/03/2013 Yes    Hypertension [I10] 06/03/2013 Yes    Hypercholesterolemia [E78.00] 07/04/2013 Yes    Mild obesity [E66.9] 06/27/2017 Yes      Problems Resolved During this Admission:    Diagnosis Date Noted Date Resolved POA       Discharged Condition: good    Follow Up:  Follow-up Information     Donald Thibodeaux MD.    Specialty:  Internal Medicine  Why:  Keep follow up appointment as scheduled  Contact information:  [address] [place], [place] [id number] [phone]             Ken Wilson MD. Schedule an appointment as soon as possible for a visit in 2 weeks.    Specialty:  Cardiology  Why:  Cardiac follow up.  Contact information:  [address] [place] [id number] [phone]                 Patient Instructions:     Diet general   Order Specific Question Answer Comments   Additional restrictions: Cardiac (Low Na/Chol)      Activity as tolerated       Medications:  Reconciled Home Medications:   Current Discharge Medication List      START taking these medications    Details   clopidogrel (PLAVIX) 75 mg tablet Take 1 tablet (75 mg total) by mouth once daily.  Qty: 90 tablet, Refills: 3      spironolactone (ALDACTONE) 25 MG tablet Take 1 tablet (25 mg total) by mouth once daily.  Qty: 90 tablet, Refills: 3         CONTINUE these medications which have NOT CHANGED    Details   aspirin (ECOTRIN) 81 MG EC tablet Take 1 tablet (81 mg total) by mouth once daily.    Associated Diagnoses: Essential hypertension      clorazepate (TRANXENE) 3.75 MG Tab Take 7.5 mg by mouth 3 (three) times daily.      losartan (COZAAR) 100 MG tablet Take 1 tablet (100 mg total) by mouth once daily.  Qty: 90 tablet, Refills: 3    Associated Diagnoses: Hypertension, malignant; Heart failure, diastolic, chronic; Hypercholesterolemia; Aortic valve disorders      metoprolol succinate (TOPROL-XL) 50 MG 24 hr  tablet Take 1 tablet (50 mg total) by mouth once daily.  Qty: 90 tablet, Refills: 3    Associated Diagnoses: Essential hypertension      oxybutynin (DITROPAN-XL) 5 MG TR24 Take 5 mg by mouth once daily.      torsemide (DEMADEX) 10 MG Tab Take 1 tablet (10 mg total) by mouth once daily.  Qty: 90 tablet, Refills: 3    Associated Diagnoses: Essential hypertension             Time spent on the discharge of patient: 60 minutes    Ken Wilson MD  Cardiology  [facility]

## 2017-11-30 NOTE — NURSING
Patient educated on discharge instructions and verbalized understanding. Pt provided with low cholesterol diet information.  All questions answered to patient's satisfaction.  IV removed without complication. Patient awaiting daughter, then  to be transported off unit via wheelchair.

## 2017-11-30 NOTE — PT/OT/SLP EVAL
Physical Therapy  Evaluation/Discharge    Debo Ceballos   MRN: [id number]   Admitting Diagnosis: Non-ST elevation myocardial infarction (NSTEMI)    PT Received On: 11/30/17  PT Start Time: 0854     PT Stop Time: 0912    PT Total Time (min): 18 min       Billable Minutes:  Evaluation 18    Diagnosis: Non-ST elevation myocardial infarction (NSTEMI)    Past Medical History:   Diagnosis Date    Aortic valve disorders 7/4/2013    Carotid stenosis 6/3/2013    2/3/2011: L CEA for 80%+ stenosis with small ulcer. Mild WILLIAM disease.    Coronary artery disease 11/28/2017 11/28/2017: [facility]: Cath: LAD: 80% involving D1. D1 osteal 80%. LV: Normal systolic function. LAD: LM 3.0 x 18 mm. D1: PTCA 2.5 mm.    Heart failure, diastolic 6/3/2013    History of coronary artery stent placement 11/28/2017 11/28/2017: [facility]: Cath: LAD: LM 3.0 x 18 mm. D1: PTCA 2.5 mm.    Hypercholesterolemia 7/4/2013    Cannot tolerate statins -muscle pains.    Hypertension, benign 6/3/2013    Diagnosed 1955.      Past Surgical History:   Procedure Laterality Date    APPENDECTOMY      BUNIONECTOMY Bilateral     CAROTID ENDARTERECTOMY      CATARACT EXTRACTION, BILATERAL      HYSTERECTOMY      TONSILLECTOMY       Referring physician: RANDI RAY  Date referred to PT: 11/30/2017    General Precautions: Standard,       Do you have any cultural, spiritual, Cheondoism conflicts, given your current situation?: none specified    Patient History:  Living Environment Comment: Pt lives alone in a single-story home with 0 steps to enter. Pt (I) with all ADLs and IADLs, including cooking, shopping, and driving. Pt has a  to clean the house. Pt uses a shower chair for seated batyhing in her walk-in shower. Pt owns, but does not use a rolling walker and a wheelchair from her , who recently passed away. Pt daughter lives next door and visits often, and assists pt with any additional needs.      Previous Level of Function:  Ambulation  "Skills: independent  Transfer Skills: independent  ADL Skills: independent  Work/Leisure Activity: independent    Subjective:  Communicated with RN prior to session.  Pt reports that she was "feeling great" and that her daughter was on her way to pick her up. Pt stated that she needed to discuss with the doctor if she could go to [place] this upcoming weekend.     Chief Complaint: none stated  Patient goals: Return home     Pain/Comfort  Pain Rating 1: 0/10  Pain Rating Post-Intervention 1: 0/10      Objective:   Cognitive Exam:  Oriented to: Person, Place, Time and Situation    Follows Commands/attention: Follows multistep  commands  Communication: clear/fluent  Safety awareness/insight to disability: intact    Physical Exam:  Postural examination/scapula alignment: Rounded shoulder and Head forward    Skin integrity: Visible skin intact  Edema: None noted on visible skin    Sensation:   Intact     Lower Extremity Range of Motion:  Right Lower Extremity: WFL  Left Lower Extremity: WFL    Lower Extremity Strength:  Right Lower Extremity: DF 5/5; knee extension 5/5; hip flexion 4-/5  Left Lower Extremity: DF 5/5; knee extension 5/5; hip flexion 4-/5    Gross motor coordination: WFL     Functional Mobility:  Bed Mobility:  Supine to Sit: Stand by Assistance  Sit to Supine: Stand by Assistance   Sit <> supine x1 trial: Demonstrated good technique and safety     Transfers:  Sit <> Stand Assistance: Contact Guard Assistance  Sit <> Stand Assistive Device: No Assistive Device   Sit <> stand x1 trial: Demonstrated good technique and safety, pushed off of stable surface    Gait:   Gait Distance: 250'  Assistance 1: Contact Guard Assistance  Gait Assistive Device: No device  Gait Pattern: reciprocal   Appropriate gait pattern and speed without need for cues. CGA for safety     Balance:   Static Sit: NORMAL: No deviations seen in posture held statically  Dynamic Sit: NORMAL: No deviations seen in posture held " dynamically  Static Stand: NORMAL: No deviations seen in posture held statically  Dynamic stand: NORMAL: No deviations seen in posture held dynamically    AM-PAC 6 CLICK MOBILITY  How much help from another person does this patient currently need?   1 = Unable, Total/Dependent Assistance  2 = A lot, Maximum/Moderate Assistance  3 = A little, Minimum/Contact Guard/Supervision  4 = None, Modified [place]/Independent    Turning over in bed (including adjusting bedclothes, sheets and blankets)?: 4  Sitting down on and standing up from a chair with arms (e.g., wheelchair, bedside commode, etc.): 3  Moving from lying on back to sitting on the side of the bed?: 4  Moving to and from a bed to a chair (including a wheelchair)?: 3  Need to walk in hospital room?: 3  Climbing 3-5 steps with a railing?: 3  Total Score: 20     AM-PAC Raw Score CMS G-Code Modifier Level of Impairment Assistance   6 % Total / Unable   7 - 9 CM 80 - 100% Maximal Assist   10 - 14 CL 60 - 80% Moderate Assist   15 - 19 CK 40 - 60% Moderate Assist   20 - 22 CJ 20 - 40% Minimal Assist   23 CI 1-20% SBA / CGA   24 CH 0% Independent/ Mod I     Patient left supine with HOB elevated with RN notified.    Assessment:   Debo Ceballos is a 86 y.o. female with a medical diagnosis of Non-ST elevation myocardial infarction (NSTEMI) and presents with below impairments. Pt demonstrated good safety awareness and technique with bed mobility and transfers. Pt does not have any further physical therapy needs at this time. Plan to discharge home.     Rehab identified problem list/impairments: Rehab identified problem list/impairments: weakness, impaired endurance    Rehab potential is good.    Activity tolerance: Good    Discharge recommendations: Discharge Facility/Level Of Care Needs: home     Barriers to discharge: Barriers to Discharge:  (Daughter lives next door, able to check on pt often )    Equipment recommendations: Equipment Needed After  Discharge: none     Yun Jd, SPT  11/30/2017    I certify that I was present in the room directing the student in service delivery and guiding them using my skilled judgment. As the co-signing therapist I have reviewed the students documentation and am responsible for the treatment, assessment, and plan.     Jenifer Pascual, PT, DPT  11/30/2017

## 2017-12-01 ENCOUNTER — PATIENT OUTREACH (OUTPATIENT)
Dept: ADMINISTRATIVE | Facility: CLINIC | Age: 82
End: 2017-12-01

## 2017-12-01 NOTE — PROGRESS NOTES
.Please forward this important TCC information to your provider in order to maximize the post discharge care delivery of this patient.    C3 nurse spoke with Debo Ceballos  for a TCC post hospital discharge follow up call. The patient does not have a scheduled HOSFU appointment with Donald Thibodeaux within 7-14 days post hospital discharge date 11\30\17. C3 nurse was unable to schedule HOSFU appointment in [facility].      Respectfully,  Traci VicenteRN  [facility]    [email or web address]       Please do not reply to this message, as this inbox is not routinely monitored.

## 2017-12-01 NOTE — PATIENT INSTRUCTIONS
Discharge Instructions for Heart Attack  You have had a heart attack (acute myocardial infarction). A heart attack occurs when a vessel that sends blood to your heart suddenly becomes blocked. This causes your heart not to work as well as it should. Follow these guidelines for home care and lifestyle changes.  Home care  · Take your medicines exactly as directed. Dont skip doses. Talk with your healthcare provider if your medicines aren't working for you. Together you can come up with another treatment plan.  · Remember that recovery after a heart attack takes time. Plan to rest for at least 4 to 8 weeks while you recover. Then return to normal activity when your doctor says its OK.  · Ask your doctor about joining a heart rehabilitation program. This can help strengthen your heart and lungs and give you more energy and confidence.  · Tell your doctor if you are feeling depressed. Feelings of sadness are common after a heart attack. But it is important to speak to someone or seek counseling if you are feeling overwhelmed by these feelings.  · Call 911 right away if you have chest pain or pain that goes to your shoulder, neck, or back. Don't drive yourself to the hospital.  · Ask your family members to learn CPR. This is an important skill that can save lives when it's needed.  · Learn to take your own blood pressure and pulse. Keep a record of your results. Ask your doctor when you should seek emergency medical attention. He or she will tell you which blood pressure reading is dangerous.  Lifestyle changes  Your heart attack might have been caused by cardiovascular disease. Your healthcare provider will work with you to make changes to your lifestyle. This will help the heart disease from getting worse. These changes will most likely be a combination of diet and exercise.  Diet  Your healthcare provider will tell you what changes you need to make to your diet. You may need to see a registered dietitian for help  with these diet changes. These changes may include:  · Cutting back on how much fat and cholesterol you eat  · Cutting back on how much salt (sodium) you eat, especially if you have high blood pressure  · Eating more fresh vegetables and fruits  · Eating lean proteins such as fish, poultry, beans, and peas, and eating less red meat and processed meats  · Using low-fat dairy products  · Using vegetable and nut oils in limited amounts  · Limiting how many sweets and processed foods such as chips, cookies, and baked goods you eat  · Limiting how often you eat out. And when you do eat out, making better food choices.  · Not eating fried or greasy foods, or foods high in saturated fat  Exercise  Your healthcare provider may tell you to get more exercise if you haven't been physically active. Depending on your case, your provider may recommend that you get moderate to vigorous physical activity for at least 40 minutes each day, and for at least 3 to 4 days each week. A few examples of moderate to vigorous activity include:  · Walking at a brisk pace, about 3 to 4 miles per hour  · Jogging or running  · Swimming or water aerobics  · Hiking  · Dancing  · Martial arts  · Tennis  · Riding a bicycle or stationary bike  Other changes  Your healthcare provider may also recommend that you:  · Lose weight. If you are overweight or obese, your provider will work with you to lose extra pounds. Making diet changes and getting more exercise can help. A good goal is to lose your 10% of your body weight in one year.  · Stop smoking. Sign up for a stop-smoking program to make it more likely for you to quit for good. You can join a stop-smoking support group. Or ask your doctor about nicotine replacement products.  · Learn to manage stress. Stress management techniques to help you deal with stress in your home and work life. This will help you feel better emotionally and ease the strain on your heart.  Follow-up  Make a follow-up  appointment as directed by our staff.     When to seek medical advice  Call 911 right away if you have:  · Chest pain that goes to your neck, jaw, back, or shoulder  · Shortness of breath  Otherwise, call your doctor immediately if you have:  · Lightheadedness, dizziness, or fainting  · Feeling of irregular heartbeat or fast pulse.   Date Last Reviewed: 10/1/2016  © 2929-5745 [employer]. [address] [place] [address], [place] [address]. All rights reserved. This information is not intended as a substitute for professional medical care. Always follow your healthcare professional's instructions.

## 2017-12-13 PROBLEM — I25.2 HISTORY OF MYOCARDIAL INFARCTION: Status: ACTIVE | Noted: 2017-11-27

## 2018-03-21 ENCOUNTER — OFFICE VISIT (OUTPATIENT)
Dept: CARDIOLOGY | Facility: CLINIC | Age: 83
End: 2018-03-21
Attending: INTERNAL MEDICINE
Payer: MEDICARE

## 2018-03-21 VITALS
SYSTOLIC BLOOD PRESSURE: 156 MMHG | WEIGHT: 176 LBS | DIASTOLIC BLOOD PRESSURE: 76 MMHG | HEIGHT: 64 IN | HEART RATE: 60 BPM | BODY MASS INDEX: 30.05 KG/M2

## 2018-03-21 DIAGNOSIS — I10 ESSENTIAL HYPERTENSION: ICD-10-CM

## 2018-03-21 DIAGNOSIS — I35.9 AORTIC VALVE DISEASE: ICD-10-CM

## 2018-03-21 DIAGNOSIS — R07.2 PRECORDIAL PAIN: ICD-10-CM

## 2018-03-21 DIAGNOSIS — I25.10 CORONARY ARTERY DISEASE INVOLVING NATIVE CORONARY ARTERY OF NATIVE HEART WITHOUT ANGINA PECTORIS: ICD-10-CM

## 2018-03-21 DIAGNOSIS — E66.9 MILD OBESITY: ICD-10-CM

## 2018-03-21 DIAGNOSIS — I25.2 HISTORY OF MYOCARDIAL INFARCTION: ICD-10-CM

## 2018-03-21 DIAGNOSIS — E78.00 HYPERCHOLESTEROLEMIA: ICD-10-CM

## 2018-03-21 DIAGNOSIS — I50.32 HEART FAILURE, DIASTOLIC, CHRONIC: ICD-10-CM

## 2018-03-21 DIAGNOSIS — I10 HYPERTENSION, MALIGNANT: ICD-10-CM

## 2018-03-21 DIAGNOSIS — I65.23 BILATERAL CAROTID ARTERY STENOSIS: ICD-10-CM

## 2018-03-21 DIAGNOSIS — Z95.5 HISTORY OF CORONARY ARTERY STENT PLACEMENT: ICD-10-CM

## 2018-03-21 DIAGNOSIS — I35.9 AORTIC VALVE DISORDER: ICD-10-CM

## 2018-03-21 PROCEDURE — 99214 OFFICE O/P EST MOD 30 MIN: CPT | Mod: S$GLB,,, | Performed by: INTERNAL MEDICINE

## 2018-03-21 RX ORDER — SPIRONOLACTONE 25 MG/1
25 TABLET ORAL DAILY
Qty: 90 TABLET | Refills: 3 | Status: SHIPPED | OUTPATIENT
Start: 2018-03-21 | End: 2018-12-06 | Stop reason: SDUPTHER

## 2018-03-21 RX ORDER — CLOPIDOGREL BISULFATE 75 MG/1
75 TABLET ORAL DAILY
Qty: 90 TABLET | Refills: 3 | Status: SHIPPED | OUTPATIENT
Start: 2018-03-21 | End: 2018-12-06

## 2018-03-21 RX ORDER — METOPROLOL SUCCINATE 50 MG/1
50 TABLET, EXTENDED RELEASE ORAL DAILY
Qty: 90 TABLET | Refills: 3 | Status: SHIPPED | OUTPATIENT
Start: 2018-03-21 | End: 2018-12-06 | Stop reason: SDUPTHER

## 2018-03-21 RX ORDER — TORSEMIDE 10 MG/1
10 TABLET ORAL DAILY
Qty: 90 TABLET | Refills: 3 | Status: SHIPPED | OUTPATIENT
Start: 2018-03-21 | End: 2018-12-06 | Stop reason: SDUPTHER

## 2018-03-21 RX ORDER — ASPIRIN 81 MG/1
81 TABLET ORAL DAILY
Qty: 90 TABLET | Refills: 3 | COMMUNITY
Start: 2018-03-21 | End: 2018-12-06 | Stop reason: SDUPTHER

## 2018-03-21 RX ORDER — LOSARTAN POTASSIUM 100 MG/1
100 TABLET ORAL DAILY
Qty: 90 TABLET | Refills: 3 | Status: SHIPPED | OUTPATIENT
Start: 2018-03-21 | End: 2018-12-06 | Stop reason: SDUPTHER

## 2018-03-21 NOTE — PROGRESS NOTES
Subjective:     Debo Ceballos is a 86 y.o. female with long history of difficult to control hypertension and hypercholesterolemia. She is mildly obese. She has severe carotid artery disease and underwent left carotid endarterectomy in 2011. She cannot tolerate statins as that causes severe muscle pains. She could not tolerate carvedilol either as she felt that caused her to get an unsteady gait and upset stomach. She could not tolerate amlodipine as that caused leg edema. She had an MRI for questionable slurred speech in 4/2013 which revealed generalized atrophy. Beginning in 7/2017 after her  passed she experienced occasional mild chest pressure. On 8/9/2017 she had a Stress Echo and was able to do 3:00 minutes without any CP and the ECG was negative as was the Echo.  The chest discomfort resolved. On 11/27/2017 after visiting her podiatrist she walked out to her car. She fairly suddenly felt a mild chest pressure with a discomfort in the back of her neck. She denied any dyspnea. She felt weak and thought she may pass out. She called for help and was brought to the ER. She had no further chest discomfort. The ECG did not reveal any clear acute changes. Troponin however was elevated to 2.0. She was admitted.  On 11/28/2017 she underwent coronary angiography that revealed a severe LAD/D1 bifurcation lesion. The Lad was stented and the D1 dilated using a two wire technique. She felt well following the intervention. Troponin level vanna to >50 following the intervention witch was not expected and felt to possibly be related to reperfusion issues. There were no wall motion abnormality seen. She had some ecchymosis in the right groin. Due to a high blood pressure spironolactone was addded to her antihypertensive regimen. She did not want to try a statin. The importance of DAPT was stressed. No exertional chest discomfort but mild exertional dyspnea. No palpitations or weak spells. Feeling well  overall.    Coronary Artery Disease   Presents for follow-up visit. Symptoms include chest pain. Pertinent negatives include no chest pressure, chest tightness, dizziness, leg swelling, muscle weakness, palpitations, shortness of breath or weight gain. Risk factors include hyperlipidemia and obesity. Her past medical history is significant for CHF. The symptoms have been stable.   Congestive Heart Failure   Presents for follow-up visit. The disease course has been stable. Associated symptoms include chest pain. Pertinent negatives include no abdominal pain, chest pressure, claudication, edema, fatigue, muscle weakness, near-syncope, nocturia, orthopnea, palpitations, paroxysmal nocturnal dyspnea, shortness of breath or unexpected weight change. The symptoms have been stable. Her past medical history is significant for CAD.   Hypertension   This is a chronic problem. The current episode started more than 1 year ago. The problem is unchanged. The problem is uncontrolled (usually 120-180/60-80 mmHg at home). Associated symptoms include chest pain. Pertinent negatives include no anxiety, blurred vision, headaches, malaise/fatigue, neck pain, orthopnea, palpitations, peripheral edema, PND, shortness of breath or sweats. Past treatments include beta blockers, angiotensin blockers and diuretics. Compliance problems include medication side effects.  There is no history of chronic renal disease.   Hyperlipidemia   This is a chronic problem. The current episode started more than 1 year ago. The problem is uncontrolled. Recent lipid tests were reviewed and are normal. Exacerbating diseases include obesity and nephrotic syndrome. She has no history of chronic renal disease, diabetes, hypothyroidism or liver disease. Associated symptoms include chest pain. Pertinent negatives include no focal sensory loss, focal weakness, leg pain, myalgias or shortness of breath.   Chest Pain    This is a recurrent problem. The problem has been  resolved. Pertinent negatives include no abdominal pain, back pain, claudication, cough, dizziness, fever, headaches, hemoptysis, irregular heartbeat, leg pain, malaise/fatigue, nausea, near-syncope, numbness, orthopnea, palpitations, PND, shortness of breath, syncope, vomiting or weakness.   Her past medical history is significant for CAD, CHF and hyperlipidemia.   Pertinent negatives for past medical history include no diabetes and no muscle weakness.       Review of Systems   Constitution: Negative for chills, fatigue, fever, weakness, malaise/fatigue, unexpected weight change and weight gain.   HENT: Negative for nosebleeds.    Eyes: Negative for blurred vision, discharge, double vision, pain, vision loss in left eye and vision loss in right eye.   Cardiovascular: Positive for chest pain and dyspnea on exertion. Negative for claudication, irregular heartbeat, leg swelling, near-syncope, orthopnea, palpitations, paroxysmal nocturnal dyspnea and syncope.   Respiratory: Negative for chest tightness, cough, hemoptysis, shortness of breath and wheezing.    Endocrine: Negative for cold intolerance and heat intolerance.   Hematologic/Lymphatic: Negative for bleeding problem. Does not bruise/bleed easily.   Skin: Negative for color change and rash.   Musculoskeletal: Positive for arthritis. Negative for back pain, falls, joint swelling, muscle weakness, myalgias and neck pain.   Gastrointestinal: Negative for abdominal pain, heartburn, hematemesis, hematochezia, hemorrhoids, jaundice, melena, nausea and vomiting.   Genitourinary: Negative for dysuria, hematuria and nocturia.   Neurological: Negative for dizziness, focal weakness, headaches, light-headedness, loss of balance, numbness and vertigo.   Psychiatric/Behavioral: Negative for altered mental status, depression and memory loss. The patient is not nervous/anxious.    Allergic/Immunologic: Negative for hives and persistent infections.       Current Outpatient  "Prescriptions on File Prior to Visit   Medication Sig Dispense Refill    aspirin (ECOTRIN) 81 MG EC tablet Take 1 tablet (81 mg total) by mouth once daily.      clopidogrel (PLAVIX) 75 mg tablet Take 1 tablet (75 mg total) by mouth once daily. 90 tablet 3    clorazepate (TRANXENE) 3.75 MG Tab Take 7.5 mg by mouth 3 (three) times daily.      losartan (COZAAR) 100 MG tablet Take 1 tablet (100 mg total) by mouth once daily. 90 tablet 3    metoprolol succinate (TOPROL-XL) 50 MG 24 hr tablet Take 1 tablet (50 mg total) by mouth once daily. 90 tablet 3    oxybutynin (DITROPAN-XL) 5 MG TR24 Take 5 mg by mouth once daily.      spironolactone (ALDACTONE) 25 MG tablet Take 1 tablet (25 mg total) by mouth once daily. 90 tablet 3    torsemide (DEMADEX) 10 MG Tab Take 1 tablet (10 mg total) by mouth once daily. 90 tablet 3     No current facility-administered medications on file prior to visit.        BP (!) 156/76   Pulse 60   Ht 5' 4" (1.626 m)   Wt 79.8 kg (176 lb)   BMI 30.21 kg/m²       Objective:     Physical Exam   Constitutional: She is oriented to person, place, and time. She appears well-developed and well-nourished.  Non-toxic appearance. No distress.   HENT:   Head: Normocephalic and atraumatic.   Nose: Nose normal.   Eyes: Conjunctivae are normal. Right eye exhibits no discharge. Left eye exhibits no discharge. Right conjunctiva is not injected. Left conjunctiva is not injected. Right pupil is round. Left pupil is round. Pupils are equal.   Neck: Neck supple. No JVD present. Carotid bruit is not present. No thyromegaly present.   Left CEA scar.   Cardiovascular: Normal rate, regular rhythm, S1 normal and S2 normal.   No extrasystoles are present. PMI is not displaced.  Exam reveals gallop and S4. Exam reveals no S3.    Murmur heard.   Medium-pitched midsystolic murmur is present with a grade of 2/6  at the upper right sternal border  Pulses:       Radial pulses are 2+ on the right side, and 2+ on the " left side.        Femoral pulses are 2+ on the right side, and 2+ on the left side.       Dorsalis pedis pulses are 1+ on the right side, and 1+ on the left side.        Posterior tibial pulses are 1+ on the right side, and 1+ on the left side.   Pulmonary/Chest: Effort normal and breath sounds normal.   Abdominal: Soft. Normal appearance. There is no hepatosplenomegaly. There is no tenderness.   Musculoskeletal:        Right ankle: She exhibits no swelling, no ecchymosis and no deformity.        Left ankle: She exhibits no swelling, no ecchymosis and no deformity.   Mild ecchymosis over right groin.   Lymphadenopathy:        Head (right side): No submandibular adenopathy present.        Head (left side): No submandibular adenopathy present.     She has no cervical adenopathy.   Neurological: She is alert and oriented to person, place, and time. She is not disoriented. No cranial nerve deficit or sensory deficit.   Skin: Skin is warm, dry and intact. No rash noted. She is not diaphoretic. No cyanosis. Nails show no clubbing.   Psychiatric: She has a normal mood and affect. Her speech is normal and behavior is normal. Judgment and thought content normal. Cognition and memory are normal.       Assessment:      1. Coronary artery disease involving native coronary artery of native heart without angina pectoris    2. History of myocardial infarction    3. History of coronary artery stent placement    4. Heart failure, diastolic, chronic    5. Aortic valve disease    6. Precordial pain    7. Bilateral carotid artery stenosis    8. Essential hypertension    9. Hypercholesterolemia    10. Mild obesity        Plan:     1. Coronary Artery Disease              11/27/2017: NSTEMI. Troponin >50.              11/28/2017: [facility] [place]: Cath: LAD: 80% involving D1. D1 osteal 80%. LV: Normal systolic function. LAD: LM 3.0 x 18 mm. D1: PTCA 2.5 mm.              On aspirin 81 mg Q24.              On clopidogrel 75 mg Q24 until  12/2018.   Doing well.     2. Heart Failure, Diastolic, Chronic              6/14/2013: Echo: Normal LV size and function. Mild aortic valve sclerosis with mild AR.              7/7/2017: Echo: Normal left ventricular size and systolic function. Mild LVH. Moderate diastolic dysfunction. Moderately dilated LA. Mild AS - 1.7 m/s. SPAP 48 mmHg.              On torsemide 10 mg Q24 and spironolactone 25 mg Q24.              Leg edema appeared at least partially related to amlodipine.              Well compensated.   Do Echo to reevaluate LV function.     3. Aortic Valve Disease              6/14/2013: Echo: Mild aortic valve sclerosis with mild AR.              7/7/2017: Echo: Mild AS - 1.7 m/s.              Stable.     4. Chest Pain              2017: Began experience chest discomfort.              8/9/2017: Stress Echo: 3:00 min. No CP. ECG negative. Echo negative.              Resolved.     5. Carotid Artery Disease              2/3/2011: Left CEA for 80% stenosis with small ulcer. Had mild WILLIAM disease.              3/20/2012: Carotid Duplex: Mild disease.              5/6/2014: Carotid Duplex: WILLIAM: moderate plaquing -1.2 m/s - 50-60%. LICA: moderate plaquing - tortuous - 1.6 m/s - 50-60%.              5/5/2015: Carotid Duplex: Moderate plaquing.               7/7/2017: Carotid Duplex: WILLIAM: Moderate plaquing - 1.3 m/s - 50-60%. LICA: Moderate plaquing - 1.8 m/s - 60-70%.              On aspirin 81 mg Q24              7/2018: Plan is next Carotid Duplex. Then yearly.     6. Hypertension              1955: Diagnosed.              Severe hypertension.              On losartan 100 mg Q24, metoprolol 50 mg Q24, spironolactone 25 mg Q24 and torsemide 10 mg Q24.              Could not tolerate carvedilol or amlodipine.              Fair control overall at home in the past but high during the hospital stay.              Keep log at home.      7. Hypercholesterolemia              2013: Could not tolerate statin due to  severe muscle pains.              Absolutely against trying any statin.     8. Mild Obesity              6/27/2017: Weight 82 kg. BMI 31.              Encouraged to lose weight.     9. Primary Care              Dr. Donald Thibodeaux.    F/u 4 months.    Ken Wilson M.D.      5/27/2018 8:50 PM, Addendum:    5/16/2018: Echo: Normal left ventricular size and systolic function. Mild LVH. Mild diastolic dysfunction. Moderately dilated LA. Mild aortic valve sclerosis - 1.9m/s.    I discussed the above test result and the implications of the findings over the phone.    Ken Wilson M.D.

## 2018-05-16 ENCOUNTER — CLINICAL SUPPORT (OUTPATIENT)
Dept: CARDIOLOGY | Facility: CLINIC | Age: 83
End: 2018-05-16
Payer: MEDICARE

## 2018-05-16 DIAGNOSIS — I50.32 HEART FAILURE, DIASTOLIC, CHRONIC: ICD-10-CM

## 2018-05-16 DIAGNOSIS — I25.10 CORONARY ARTERY DISEASE: ICD-10-CM

## 2018-05-16 PROCEDURE — 93306 TTE W/DOPPLER COMPLETE: CPT | Mod: S$GLB,,, | Performed by: INTERNAL MEDICINE

## 2018-05-28 ENCOUNTER — HOSPITAL ENCOUNTER (EMERGENCY)
Facility: OTHER | Age: 83
Discharge: HOME | End: 2018-05-28
Attending: EMERGENCY MEDICINE
Payer: MEDICARE

## 2018-05-28 VITALS
HEART RATE: 54 BPM | RESPIRATION RATE: 28 BRPM | HEIGHT: 64 IN | TEMPERATURE: 98 F | WEIGHT: 172 LBS | OXYGEN SATURATION: 97 % | BODY MASS INDEX: 29.37 KG/M2 | DIASTOLIC BLOOD PRESSURE: 71 MMHG | SYSTOLIC BLOOD PRESSURE: 168 MMHG

## 2018-05-28 DIAGNOSIS — R53.1 WEAKNESS: ICD-10-CM

## 2018-05-28 DIAGNOSIS — E86.0 DEHYDRATION: Primary | ICD-10-CM

## 2018-05-28 DIAGNOSIS — R53.1 GENERALIZED WEAKNESS: ICD-10-CM

## 2018-05-28 LAB
ALBUMIN SERPL BCP-MCNC: 3.6 G/DL
ALP SERPL-CCNC: 52 U/L
ALT SERPL W/O P-5'-P-CCNC: 21 U/L
ANION GAP SERPL CALC-SCNC: 9 MMOL/L
AST SERPL-CCNC: 22 U/L
BACTERIA #/AREA URNS HPF: ABNORMAL /HPF
BASOPHILS # BLD AUTO: 0.04 K/UL
BASOPHILS NFR BLD: 0.7 %
BILIRUB SERPL-MCNC: 0.4 MG/DL
BILIRUB UR QL STRIP: NEGATIVE
BUN SERPL-MCNC: 27 MG/DL
CALCIUM SERPL-MCNC: 10.1 MG/DL
CHLORIDE SERPL-SCNC: 103 MMOL/L
CLARITY UR: CLEAR
CO2 SERPL-SCNC: 30 MMOL/L
COLOR UR: YELLOW
CREAT SERPL-MCNC: 1.2 MG/DL
DIFFERENTIAL METHOD: ABNORMAL
EOSINOPHIL # BLD AUTO: 0.2 K/UL
EOSINOPHIL NFR BLD: 2.6 %
ERYTHROCYTE [DISTWIDTH] IN BLOOD BY AUTOMATED COUNT: 13.7 %
EST. GFR  (AFRICAN AMERICAN): 47 ML/MIN/1.73 M^2
EST. GFR  (NON AFRICAN AMERICAN): 41 ML/MIN/1.73 M^2
GLUCOSE SERPL-MCNC: 109 MG/DL
GLUCOSE UR QL STRIP: NEGATIVE
HCT VFR BLD AUTO: 39.8 %
HGB BLD-MCNC: 12.7 G/DL
HGB UR QL STRIP: ABNORMAL
HYALINE CASTS #/AREA URNS LPF: 2 /LPF
KETONES UR QL STRIP: NEGATIVE
LEUKOCYTE ESTERASE UR QL STRIP: NEGATIVE
LYMPHOCYTES # BLD AUTO: 2.1 K/UL
LYMPHOCYTES NFR BLD: 33.6 %
MCH RBC QN AUTO: 30.3 PG
MCHC RBC AUTO-ENTMCNC: 31.9 G/DL
MCV RBC AUTO: 95 FL
MICROSCOPIC COMMENT: ABNORMAL
MONOCYTES # BLD AUTO: 0.4 K/UL
MONOCYTES NFR BLD: 5.9 %
NEUTROPHILS # BLD AUTO: 3.5 K/UL
NEUTROPHILS NFR BLD: 56.7 %
NITRITE UR QL STRIP: NEGATIVE
PH UR STRIP: 6 [PH] (ref 5–8)
PLATELET # BLD AUTO: 183 K/UL
PMV BLD AUTO: 12.1 FL
POTASSIUM SERPL-SCNC: 5 MMOL/L
PROT SERPL-MCNC: 7.5 G/DL
PROT UR QL STRIP: NEGATIVE
RBC # BLD AUTO: 4.19 M/UL
RBC #/AREA URNS HPF: 0 /HPF (ref 0–4)
SODIUM SERPL-SCNC: 142 MMOL/L
SP GR UR STRIP: 1.01 (ref 1–1.03)
URN SPEC COLLECT METH UR: ABNORMAL
UROBILINOGEN UR STRIP-ACNC: NEGATIVE EU/DL
WBC # BLD AUTO: 6.1 K/UL
WBC #/AREA URNS HPF: 1 /HPF (ref 0–5)

## 2018-05-28 PROCEDURE — 93005 ELECTROCARDIOGRAM TRACING: CPT

## 2018-05-28 PROCEDURE — 99284 EMERGENCY DEPT VISIT MOD MDM: CPT | Mod: 25

## 2018-05-28 PROCEDURE — 93010 ELECTROCARDIOGRAM REPORT: CPT | Mod: ,,, | Performed by: INTERNAL MEDICINE

## 2018-05-28 PROCEDURE — 80053 COMPREHEN METABOLIC PANEL: CPT

## 2018-05-28 PROCEDURE — 85025 COMPLETE CBC W/AUTO DIFF WBC: CPT

## 2018-05-28 PROCEDURE — 81000 URINALYSIS NONAUTO W/SCOPE: CPT

## 2018-05-28 RX ORDER — CYCLOPENTOLATE HYDROCHLORIDE 10 MG/ML
2 SOLUTION/ DROPS OPHTHALMIC EVERY 6 HOURS PRN
Qty: 15 ML | Refills: 0 | Status: SHIPPED | OUTPATIENT
Start: 2018-05-28 | End: 2018-05-28 | Stop reason: CLARIF

## 2018-05-28 NOTE — ED NOTES
Patient presents to the ED with a complaint of weakness that started today, patient has a history of of documented cardiac diseases. Patient denies any chest pain, sob of breath, blurred vision. Patient able to ambulate to the bedside commode without difficulty. Patient hand  equal, radial and pedal pulses noted. Patient currently vocalizing no complaints other than being weak

## 2018-05-28 NOTE — ED PROVIDER NOTES
Encounter Date: 5/28/2018    SCRIBE #1 NOTE: I, Porsche Alejandro, am scribing for, and in the presence of, Dr. Weaver.       History     Chief Complaint   Patient presents with    Fatigue     Pt reports weakness for 90 minutes, also reports blurry vision with a warmth sensation X's 3     Time seen by provider: 4:46 PM    This is a 86 y.o. female, with a history of carotid stenosis, CAD, diastolic heart failure, and Bright's disease, who presents via EMS with complaint of generalized weakness. Patient reports she was folding clothes when she began having blurred vision. She then reports feeling a warmth sensation to her face, and then had generalized weakness. She reports burred vision has resolved. She denies fever, shortness of breath, chest pain, palpitations, dysuria, facial asymmetry, extremity weakness. She denies taking any OTC medications for symptoms.       The history is provided by the patient.     Review of patient's allergies indicates:   Allergen Reactions    Apresoline [hydralazine]     Statins-hmg-coa reductase inhibitors      Cramps in legs and feet.    Streptomycin      Told never to take it would not make it to hospital     Past Medical History:   Diagnosis Date    Aortic valve disorders 7/4/2013    Carotid stenosis 6/3/2013    2/3/2011: L CEA for 80%+ stenosis with small ulcer. Mild WILLIAM disease.    Coronary artery disease 11/28/2017 11/28/2017: [facility]: Cath: LAD: 80% involving D1. D1 osteal 80%. LV: Normal systolic function. LAD: LM 3.0 x 18 mm. D1: PTCA 2.5 mm.    Heart failure, diastolic 6/3/2013    History of coronary artery stent placement 11/28/2017 11/28/2017: [facility]: Cath: LAD: LM 3.0 x 18 mm. D1: PTCA 2.5 mm.    Hypercholesterolemia 7/4/2013    Cannot tolerate statins -muscle pains.    Hypertension, benign 6/3/2013    Diagnosed 1955.     Past Surgical History:   Procedure Laterality Date    APPENDECTOMY      BUNIONECTOMY Bilateral     CAROTID ENDARTERECTOMY      CATARACT  EXTRACTION, BILATERAL      HYSTERECTOMY      TONSILLECTOMY       History reviewed. No pertinent family history.  Social History   Substance Use Topics    Smoking status: Former Smoker     Packs/day: 0.25     Years: 7.00     Start date: 1/1/1951     Quit date: 1/1/1958    Smokeless tobacco: Never Used    Alcohol use Yes      Comment: monthly      Review of Systems   Constitutional: Negative for chills and fever.   HENT: Negative for congestion and sore throat.    Eyes: Positive for visual disturbance (blurred (resolved)). Negative for photophobia.   Respiratory: Negative for cough and shortness of breath.    Cardiovascular: Negative for chest pain and palpitations.   Gastrointestinal: Negative for abdominal pain, constipation, diarrhea, nausea and vomiting.   Genitourinary: Negative for dysuria.   Musculoskeletal: Negative for back pain.   Skin: Negative for rash.   Neurological: Positive for weakness (generalized). Negative for facial asymmetry and headaches.       Physical Exam     Initial Vitals [05/28/18 1628]   BP Pulse Resp Temp SpO2   (!) 194/77 (!) 53 18 98.4 °F (36.9 °C) 95 %      MAP       116         Physical Exam    Nursing note and vitals reviewed.  Constitutional: She appears well-developed and well-nourished. She is not diaphoretic. No distress.   HENT:   Head: Normocephalic and atraumatic.   Right Ear: External ear normal.   Left Ear: External ear normal.   Mildly dry mucous membranes.   Eyes: EOM are normal. Right eye exhibits no discharge. Left eye exhibits no discharge.   Neck: Normal range of motion.   Cardiovascular: Normal rate, regular rhythm and normal heart sounds. Exam reveals no gallop and no friction rub.    No murmur heard.  Pulmonary/Chest: Breath sounds normal. No respiratory distress. She has no wheezes. She has no rhonchi. She has no rales.   Abdominal: Soft. There is no tenderness. There is no rebound and no guarding.   Musculoskeletal: Normal range of motion. She exhibits no  edema or tenderness.   Neurological: She is alert and oriented to person, place, and time.   Skin: Skin is warm and dry. No rash and no abscess noted. No erythema. No pallor.   Psychiatric: She has a normal mood and affect. Her behavior is normal. Judgment and thought content normal.         ED Course   Procedures  Labs Reviewed   CBC W/ AUTO DIFFERENTIAL - Abnormal; Notable for the following:        Result Value    MCHC 31.9 (*)     All other components within normal limits   COMPREHENSIVE METABOLIC PANEL - Abnormal; Notable for the following:     CO2 30 (*)     BUN, Bld 27 (*)     Alkaline Phosphatase 52 (*)     eGFR if  47 (*)     eGFR if non  41 (*)     All other components within normal limits   URINALYSIS - Abnormal; Notable for the following:     Occult Blood UA 1+ (*)     All other components within normal limits   URINALYSIS MICROSCOPIC - Abnormal; Notable for the following:     Hyaline Casts, UA 2 (*)     All other components within normal limits     EKG Readings: (Independently Interpreted)   Sinus bradycardia at a rate of 55 bpm with sinus arrhythmia. No Ischemia.      Imaging Results          X-Ray Chest AP Portable (Final result)  Result time 05/28/18 17:30:31    Final result by Cali Andujar MD (05/28/18 17:30:31)                 Impression:      No detrimental change or radiographic acute intrathoracic process seen.      Electronically signed by: Cali Andujar MD  Date:    05/28/2018  Time:    17:30             Narrative:    EXAMINATION:  XR CHEST AP PORTABLE    CLINICAL HISTORY:  Chest Pain;    TECHNIQUE:  Single frontal view of the chest was performed.    COMPARISON:  Chest radiograph 11/27/2017    FINDINGS:  Monitoring leads overlie the chest.  Large body habitus.  Cardiac silhouette remains enlarged without convincing evidence of failure.  Grossly stable mediastinal contours and hilar regions.  The lungs are symmetrically well expanded without large consolidation  or new focal opacity.  No large pleural effusion or pneumothorax.  No acute osseous process seen.                                X-Rays:   Independently Interpreted Readings:   Chest X-Ray: No infiltrate or effusion.      Medical Decision Making:   Independently Interpreted Test(s):   I have ordered and independently interpreted X-rays - see prior notes.  I have ordered and independently interpreted EKG Reading(s) - see prior notes  Clinical Tests:   Lab Tests: Ordered and Reviewed  Radiological Study: Ordered and Reviewed  Medical Tests: Ordered and Reviewed  ED Management:  6:26 PM Feels better after fluids. Weakness resolved. No current complaints.               Attending Attestation:           Physician Attestation for Scribe:  Physician Attestation Statement for Scribe #1: I, Dr. Weaver, reviewed documentation, as scribed by Porsche Alejandro in my presence, and it is both accurate and complete.           Patient presents complaining of episode of blurry vision bilaterally. No missing parts of her vision, floaters.  Associated by flushing of the face but no asymmetry/weakness of the face and a generalized weakness. No focal deficits.  No chest pain shortness of breath. Patient feeling better upon arrival here with only residual weakness a given IV fluids and observed.  The symptoms resolved workup including EKG, chest x-ray, urinalysis laboratory studies were unremarkable with only mild elevation of BUN and creatinine above her baseline, consistent with mild dehydration.  Encouraged fluids at home.  The patient is now able to stand, ambulate without symptoms feels back to her normal self.  Encouraged follow-up with primary care, especially of for further symptoms             Clinical Impression:     1. Dehydration    2. Weakness    3. Generalized weakness                               Jonny Weaver II, MD  05/28/18 [id number]

## 2018-05-31 DIAGNOSIS — I25.10 CORONARY ARTERY DISEASE INVOLVING NATIVE CORONARY ARTERY OF NATIVE HEART WITHOUT ANGINA PECTORIS: ICD-10-CM

## 2018-05-31 DIAGNOSIS — I50.32 HEART FAILURE, DIASTOLIC, CHRONIC: ICD-10-CM

## 2018-08-02 ENCOUNTER — OFFICE VISIT (OUTPATIENT)
Dept: CARDIOLOGY | Facility: CLINIC | Age: 83
End: 2018-08-02
Attending: INTERNAL MEDICINE
Payer: MEDICARE

## 2018-08-02 VITALS
HEIGHT: 64 IN | DIASTOLIC BLOOD PRESSURE: 67 MMHG | HEART RATE: 50 BPM | WEIGHT: 174 LBS | BODY MASS INDEX: 29.71 KG/M2 | SYSTOLIC BLOOD PRESSURE: 140 MMHG

## 2018-08-02 DIAGNOSIS — I25.10 CORONARY ARTERY DISEASE INVOLVING NATIVE CORONARY ARTERY OF NATIVE HEART WITHOUT ANGINA PECTORIS: ICD-10-CM

## 2018-08-02 DIAGNOSIS — Z95.5 HISTORY OF CORONARY ARTERY STENT PLACEMENT: ICD-10-CM

## 2018-08-02 DIAGNOSIS — Z87.898 HISTORY OF CHEST PAIN: ICD-10-CM

## 2018-08-02 DIAGNOSIS — I65.23 BILATERAL CAROTID ARTERY STENOSIS: ICD-10-CM

## 2018-08-02 DIAGNOSIS — I50.32 HEART FAILURE, DIASTOLIC, CHRONIC: ICD-10-CM

## 2018-08-02 DIAGNOSIS — E66.9 MILD OBESITY: ICD-10-CM

## 2018-08-02 DIAGNOSIS — E78.00 HYPERCHOLESTEROLEMIA: ICD-10-CM

## 2018-08-02 DIAGNOSIS — I10 ESSENTIAL HYPERTENSION: ICD-10-CM

## 2018-08-02 DIAGNOSIS — I25.2 HISTORY OF MYOCARDIAL INFARCTION: ICD-10-CM

## 2018-08-02 DIAGNOSIS — I35.9 AORTIC VALVE DISEASE: ICD-10-CM

## 2018-08-02 PROBLEM — R55 NEAR SYNCOPE: Status: RESOLVED | Noted: 2017-11-29 | Resolved: 2018-08-02

## 2018-08-02 PROCEDURE — 99214 OFFICE O/P EST MOD 30 MIN: CPT | Mod: S$GLB,,, | Performed by: INTERNAL MEDICINE

## 2018-08-02 NOTE — PROGRESS NOTES
Subjective:     Debo Ceballos is a 86 y.o. female with long history of difficult to control hypertension and hypercholesterolemia. She is mildly obese. She has severe carotid artery disease and underwent left carotid endarterectomy in 2011. She cannot tolerate statins as that causes severe muscle pains. She could not tolerate carvedilol either as she felt that caused her to get an unsteady gait and upset stomach. She could not tolerate amlodipine as that caused leg edema. She had an MRI for questionable slurred speech in 4/2013 which revealed generalized atrophy. Beginning in 7/2017 after her  passed she experienced occasional mild chest pressure. On 8/9/2017 she had a Stress Echo and was able to do 3:00 minutes without any CP and the ECG was negative as was the Echo.  The chest discomfort resolved. On 11/27/2017 after visiting her podiatrist she walked out to her car. She fairly suddenly felt a mild chest pressure with a discomfort in the back of her neck. She denied any dyspnea. She felt weak and thought she may pass out. She called for help and was brought to the ER. She had no further chest discomfort. The ECG did not reveal any clear acute changes. Troponin however was elevated to 2.0. She was admitted.  On 11/28/2017 she underwent coronary angiography that revealed a severe LAD/D1 bifurcation lesion. The LAD was stented and the D1 dilated using a two wire technique. She felt well following the intervention. The troponin vanna to >50 following the intervention which was not expected and felt to possibly be related to reperfusion issues. There were no wall motion abnormality seen. She had some ecchymosis in the right groin. Due to a high blood pressure spironolactone was addded to her antihypertensive regimen. She did not want to try a statin. The importance of DAPT was stressed. On 5/16/2018 she had an Echo that revealed nNormal left ventricular size and systolic function with mild LVH. There was  mild diastolic dysfunction and the LA was moderately dilated. In addition there was mild aortic valve sclerosis with a peak velocity of 1.9 m/s. No exertional chest discomfort but mild exertional dyspnea. No palpitations or weak spells. Feeling well overall.    Coronary Artery Disease   Presents for follow-up visit. Pertinent negatives include no chest pain, chest pressure, chest tightness, dizziness, leg swelling, muscle weakness, palpitations, shortness of breath or weight gain. Risk factors include hyperlipidemia and obesity. Her past medical history is significant for CHF. The symptoms have been stable.   Congestive Heart Failure   Presents for follow-up visit. The disease course has been stable. Pertinent negatives include no abdominal pain, chest pain, chest pressure, claudication, edema, fatigue, muscle weakness, near-syncope, nocturia, orthopnea, palpitations, paroxysmal nocturnal dyspnea, shortness of breath or unexpected weight change. The symptoms have been stable. Her past medical history is significant for CAD.   Chest Pain    This is a recurrent problem. The problem has been resolved. Pertinent negatives include no abdominal pain, back pain, claudication, cough, dizziness, fever, headaches, hemoptysis, irregular heartbeat, leg pain, malaise/fatigue, nausea, near-syncope, numbness, orthopnea, palpitations, PND, shortness of breath, syncope, vomiting or weakness.   Her past medical history is significant for CAD, CHF and hyperlipidemia.   Pertinent negatives for past medical history include no diabetes and no muscle weakness.   Hypertension   This is a chronic problem. The current episode started more than 1 year ago. The problem is unchanged. The problem is uncontrolled (usually 120-160/60-80 mmHg at home). Pertinent negatives include no anxiety, blurred vision, chest pain, headaches, malaise/fatigue, neck pain, orthopnea, palpitations, peripheral edema, PND, shortness of breath or sweats. Past  treatments include beta blockers, angiotensin blockers and diuretics. Compliance problems include medication side effects.  There is no history of chronic renal disease.   Hyperlipidemia   This is a chronic problem. The current episode started more than 1 year ago. The problem is uncontrolled. Recent lipid tests were reviewed and are normal. Exacerbating diseases include obesity and nephrotic syndrome. She has no history of chronic renal disease, diabetes, hypothyroidism or liver disease. Pertinent negatives include no chest pain, focal sensory loss, focal weakness, leg pain, myalgias or shortness of breath.       Review of Systems   Constitution: Negative for chills, fatigue, fever, weakness, malaise/fatigue, unexpected weight change and weight gain.   HENT: Negative for nosebleeds.    Eyes: Negative for blurred vision, discharge, double vision, pain, vision loss in left eye and vision loss in right eye.   Cardiovascular: Positive for dyspnea on exertion. Negative for chest pain, claudication, irregular heartbeat, leg swelling, near-syncope, orthopnea, palpitations, paroxysmal nocturnal dyspnea and syncope.   Respiratory: Negative for chest tightness, cough, hemoptysis, shortness of breath and wheezing.    Endocrine: Negative for cold intolerance and heat intolerance.   Hematologic/Lymphatic: Negative for bleeding problem. Does not bruise/bleed easily.   Skin: Negative for color change and rash.   Musculoskeletal: Positive for arthritis. Negative for back pain, falls, joint swelling, muscle weakness, myalgias and neck pain.   Gastrointestinal: Negative for abdominal pain, heartburn, hematemesis, hematochezia, hemorrhoids, jaundice, melena, nausea and vomiting.   Genitourinary: Negative for dysuria, hematuria and nocturia.   Neurological: Negative for dizziness, focal weakness, headaches, light-headedness, loss of balance, numbness and vertigo.   Psychiatric/Behavioral: Negative for altered mental status, depression  "and memory loss. The patient is not nervous/anxious.    Allergic/Immunologic: Negative for hives and persistent infections.       Current Outpatient Prescriptions on File Prior to Visit   Medication Sig Dispense Refill    aspirin (ECOTRIN) 81 MG EC tablet Take 1 tablet (81 mg total) by mouth once daily. 90 tablet 3    clopidogrel (PLAVIX) 75 mg tablet Take 1 tablet (75 mg total) by mouth once daily. 90 tablet 3    clorazepate (TRANXENE) 3.75 MG Tab Take 7.5 mg by mouth 3 (three) times daily.      losartan (COZAAR) 100 MG tablet Take 1 tablet (100 mg total) by mouth once daily. 90 tablet 3    metoprolol succinate (TOPROL-XL) 50 MG 24 hr tablet Take 1 tablet (50 mg total) by mouth once daily. 90 tablet 3    oxybutynin (DITROPAN-XL) 5 MG TR24 Take 5 mg by mouth once daily.      spironolactone (ALDACTONE) 25 MG tablet Take 1 tablet (25 mg total) by mouth once daily. 90 tablet 3    torsemide (DEMADEX) 10 MG Tab Take 1 tablet (10 mg total) by mouth once daily. 90 tablet 3     No current facility-administered medications on file prior to visit.        BP (!) 140/67   Pulse (!) 50   Ht 5' 4" (1.626 m)   Wt 78.9 kg (174 lb)   BMI 29.87 kg/m²       Objective:     Physical Exam   Constitutional: She is oriented to person, place, and time. She appears well-developed and well-nourished.  Non-toxic appearance. No distress.   HENT:   Head: Normocephalic and atraumatic.   Nose: Nose normal.   Eyes: Conjunctivae are normal. Right eye exhibits no discharge. Left eye exhibits no discharge. Right conjunctiva is not injected. Left conjunctiva is not injected. Right pupil is round. Left pupil is round. Pupils are equal.   Neck: Neck supple. No JVD present. Carotid bruit is not present. No thyromegaly present.   Left CEA scar.   Cardiovascular: Normal rate, regular rhythm, S1 normal and S2 normal.   No extrasystoles are present. PMI is not displaced.  Exam reveals gallop and S4. Exam reveals no S3.    Murmur heard.   " Medium-pitched midsystolic murmur is present with a grade of 2/6  at the upper right sternal border  Pulses:       Radial pulses are 2+ on the right side, and 2+ on the left side.        Femoral pulses are 2+ on the right side, and 2+ on the left side.       Dorsalis pedis pulses are 1+ on the right side, and 1+ on the left side.        Posterior tibial pulses are 1+ on the right side, and 1+ on the left side.   Pulmonary/Chest: Effort normal and breath sounds normal.   Abdominal: Soft. Normal appearance. There is no hepatosplenomegaly. There is no tenderness.   Musculoskeletal:        Right ankle: She exhibits no swelling, no ecchymosis and no deformity.        Left ankle: She exhibits no swelling, no ecchymosis and no deformity.   Lymphadenopathy:        Head (right side): No submandibular adenopathy present.        Head (left side): No submandibular adenopathy present.     She has no cervical adenopathy.   Neurological: She is alert and oriented to person, place, and time. She is not disoriented. No cranial nerve deficit or sensory deficit.   Skin: Skin is warm, dry and intact. No rash noted. She is not diaphoretic. Nails show no clubbing.   Psychiatric: She has a normal mood and affect. Her speech is normal and behavior is normal. Judgment and thought content normal. Cognition and memory are normal.       Assessment:      1. Coronary artery disease involving native coronary artery of native heart without angina pectoris    2. History of myocardial infarction    3. History of coronary artery stent placement    4. Heart failure, diastolic, chronic    5. Aortic valve disease    6. History of chest pain    7. Bilateral carotid artery stenosis    8. Essential hypertension    9. Hypercholesterolemia    10. Mild obesity        Plan:     1. Coronary Artery Disease              11/27/2017: NSTEMI. Troponin >50.              11/28/2017: OB: Cath: LAD: 80% involving D1. D1 osteal 80%. LV: Normal systolic function. LAD:  LM 3.0 x 18 mm. D1: PTCA 2.5 mm.              On aspirin 81 mg Q24.              On clopidogrel 75 mg Q24 until 12/2018.   Doing well.     2. Heart Failure, Diastolic, Chronic              6/14/2013: Echo: Normal LV size and function. Mild aortic valve sclerosis with mild AR.              7/7/2017: Echo: Normal left ventricular size and systolic function. Mild LVH. Moderate diastolic dysfunction. Moderately dilated LA. Mild AS - 1.7 m/s. SPAP 48 mmHg.   5/16/2018: Echo: Normal left ventricular size and systolic function. Mild LVH. Mild diastolic dysfunction. Moderately dilated LA. Mild aortic valve sclerosis - 1.9 m/s.              On torsemide 10 mg Q24 and spironolactone 25 mg Q24.              Leg edema appeared at least partially related to amlodipine.              Well compensated.     3. Aortic Valve Disease              6/14/2013: Echo: Mild aortic valve sclerosis with mild AR.              7/7/2017: Echo: Mild AS - 1.7 m/s.   5/16/2018: Echo: Mild aortic valve sclerosis - 1.9 m/s.              Stable.     4. History of Chest Pain              2017: Began experience chest discomfort.              8/9/2017: Stress Echo: 3:00 min. No CP. ECG negative. Echo negative.              Resolved.     5. Carotid Artery Disease              2/3/2011: Left CEA for 80% stenosis with small ulcer. Had mild WILLIAM disease.              3/20/2012: Carotid Duplex: Mild disease.              5/6/2014: Carotid Duplex: WILLIAM: moderate plaquing -1.2 m/s - 50-60%. LICA: moderate plaquing - tortuous - 1.6 m/s - 50-60%.              5/5/2015: Carotid Duplex: Moderate plaquing.               7/7/2017: Carotid Duplex: WILLIAM: Moderate plaquing - 1.3 m/s - 50-60%. LICA: Moderate plaquing - 1.8 m/s - 60-70%.              On aspirin 81 mg Q24              7/2018: Plan was next Carotid Duplex. Then yearly.     6. Hypertension              1955: Diagnosed.              Severe hypertension.              On losartan 100 mg Q24, metoprolol 50 mg  Q24, spironolactone 25 mg Q24 and torsemide 10 mg Q24.              Could not tolerate carvedilol or amlodipine.              Fair control overall at home in the past but high during the hospital stay.              Keep log at home.      7. Hypercholesterolemia              2013: Could not tolerate statin due to severe muscle pains.              Absolutely against trying any statin.     8. Mild Obesity              6/27/2017: Weight 82 kg. BMI 31.              Encouraged to lose weight.     9. Primary Care              Dr. Donald Thibodeaux.    F/u 4 months.    Ken Wilson M.D.      8/22/2018 4:03 PM, Addendum:    8/15/2018: Carotid Duplex: WILLIAM: Moderate plaquing - 1.5 m/s - 50-60%. LICA: Moderate plaquing - 1.3 m/s - 50-60%.    I discussed the above test result and the implications of the findings over the phone.    Ken Wilson M.D.

## 2018-08-15 ENCOUNTER — CLINICAL SUPPORT (OUTPATIENT)
Dept: CARDIOLOGY | Facility: CLINIC | Age: 83
End: 2018-08-15
Attending: INTERNAL MEDICINE
Payer: MEDICARE

## 2018-08-15 DIAGNOSIS — I65.23 BILATERAL CAROTID ARTERY STENOSIS: ICD-10-CM

## 2018-08-15 PROCEDURE — 93880 EXTRACRANIAL BILAT STUDY: CPT | Mod: S$GLB,,, | Performed by: INTERNAL MEDICINE

## 2018-12-06 ENCOUNTER — OFFICE VISIT (OUTPATIENT)
Dept: CARDIOLOGY | Facility: CLINIC | Age: 83
End: 2018-12-06
Attending: INTERNAL MEDICINE
Payer: MEDICARE

## 2018-12-06 VITALS
DIASTOLIC BLOOD PRESSURE: 73 MMHG | BODY MASS INDEX: 30.22 KG/M2 | SYSTOLIC BLOOD PRESSURE: 140 MMHG | WEIGHT: 177 LBS | HEIGHT: 64 IN | HEART RATE: 64 BPM

## 2018-12-06 DIAGNOSIS — E78.00 HYPERCHOLESTEROLEMIA: ICD-10-CM

## 2018-12-06 DIAGNOSIS — I35.9 AORTIC VALVE DISEASE: ICD-10-CM

## 2018-12-06 DIAGNOSIS — I10 ESSENTIAL HYPERTENSION: ICD-10-CM

## 2018-12-06 DIAGNOSIS — Z87.898 HISTORY OF CHEST PAIN: ICD-10-CM

## 2018-12-06 DIAGNOSIS — I25.10 CORONARY ARTERY DISEASE INVOLVING NATIVE CORONARY ARTERY OF NATIVE HEART WITHOUT ANGINA PECTORIS: ICD-10-CM

## 2018-12-06 DIAGNOSIS — I50.32 HEART FAILURE, DIASTOLIC, CHRONIC: ICD-10-CM

## 2018-12-06 DIAGNOSIS — I65.23 BILATERAL CAROTID ARTERY STENOSIS: ICD-10-CM

## 2018-12-06 DIAGNOSIS — I25.2 HISTORY OF MYOCARDIAL INFARCTION: ICD-10-CM

## 2018-12-06 DIAGNOSIS — Z95.5 HISTORY OF CORONARY ARTERY STENT PLACEMENT: ICD-10-CM

## 2018-12-06 DIAGNOSIS — E66.9 MILD OBESITY: ICD-10-CM

## 2018-12-06 PROCEDURE — 99214 OFFICE O/P EST MOD 30 MIN: CPT | Mod: S$GLB,,, | Performed by: INTERNAL MEDICINE

## 2018-12-06 RX ORDER — LOSARTAN POTASSIUM 100 MG/1
100 TABLET ORAL DAILY
Qty: 90 TABLET | Refills: 3 | Status: SHIPPED | OUTPATIENT
Start: 2018-12-06 | End: 2019-06-05 | Stop reason: SDUPTHER

## 2018-12-06 RX ORDER — SPIRONOLACTONE 25 MG/1
25 TABLET ORAL DAILY
Qty: 90 TABLET | Refills: 3 | Status: SHIPPED | OUTPATIENT
Start: 2018-12-06 | End: 2019-06-05 | Stop reason: SDUPTHER

## 2018-12-06 RX ORDER — METOPROLOL SUCCINATE 50 MG/1
50 TABLET, EXTENDED RELEASE ORAL DAILY
Qty: 90 TABLET | Refills: 3 | Status: SHIPPED | OUTPATIENT
Start: 2018-12-06 | End: 2019-06-05 | Stop reason: SDUPTHER

## 2018-12-06 RX ORDER — TORSEMIDE 10 MG/1
10 TABLET ORAL DAILY
Qty: 90 TABLET | Refills: 3 | Status: SHIPPED | OUTPATIENT
Start: 2018-12-06 | End: 2019-06-05 | Stop reason: SDUPTHER

## 2018-12-06 RX ORDER — ASPIRIN 81 MG/1
81 TABLET ORAL DAILY
Qty: 90 TABLET | Refills: 3 | COMMUNITY
Start: 2018-12-06 | End: 2019-06-05 | Stop reason: SDUPTHER

## 2018-12-06 NOTE — PROGRESS NOTES
Subjective:     Debo Ceballos is a 87 y.o. female with long history of difficult to control hypertension and hypercholesterolemia. She is mildly obese. She has severe carotid artery disease and underwent left carotid endarterectomy in 2011. She cannot tolerate statins as that causes severe muscle pains. She could not tolerate carvedilol either as she felt that caused her to get an unsteady gait and upset stomach. She could not tolerate amlodipine as that caused leg edema. She had an MRI for questionable slurred speech in 4/2013 which revealed generalized atrophy. Beginning in 7/2017 after her  passed she experienced occasional mild chest pressure. On 8/9/2017 she had a Stress Echo and was able to do 3:00 minutes without any CP and the ECG was negative as was the Echo.  The chest discomfort resolved. On 11/27/2017 after visiting her podiatrist she walked out to her car. She fairly suddenly felt a mild chest pressure with a discomfort in the back of her neck. She denied any dyspnea. She felt weak and thought she may pass out. She called for help and was brought to the ER. She had no further chest discomfort. The ECG did not reveal any clear acute changes. Troponin however was elevated to 2.0. She was admitted.  On 11/28/2017 she underwent coronary angiography that revealed a severe LAD/D1 bifurcation lesion. The LAD was stented and the D1 dilated using a two wire technique. She felt well following the intervention. The troponin vanna to >50 following the intervention which was not expected and felt to possibly be related to reperfusion issues. There were no wall motion abnormality seen. She had some ecchymosis in the right groin. Due to a high blood pressure spironolactone was addded to her antihypertensive regimen. She did not want to try a statin. The importance of DAPT was stressed. On 5/16/2018 she had an Echo that revealed nNormal left ventricular size and systolic function with mild LVH. There was  mild diastolic dysfunction and the LA was moderately dilated. In addition there was mild aortic valve sclerosis with a peak velocity of 1.9 m/s. No exertional chest discomfort but mild exertional dyspnea. No palpitations or weak spells. No bleeding. Feeling well overall.      Coronary Artery Disease   Presents for follow-up visit. Pertinent negatives include no chest pain, chest pressure, chest tightness, dizziness, leg swelling, muscle weakness, palpitations, shortness of breath or weight gain. Risk factors include hyperlipidemia and obesity. Her past medical history is significant for CHF. The symptoms have been stable.   Congestive Heart Failure   Presents for follow-up visit. The disease course has been stable. Pertinent negatives include no abdominal pain, chest pain, chest pressure, claudication, edema, fatigue, muscle weakness, near-syncope, nocturia, orthopnea, palpitations, paroxysmal nocturnal dyspnea, shortness of breath or unexpected weight change. The symptoms have been stable. Her past medical history is significant for CAD.   Chest Pain    This is a recurrent problem. The problem has been resolved. Pertinent negatives include no abdominal pain, back pain, claudication, cough, dizziness, fever, headaches, hemoptysis, irregular heartbeat, leg pain, malaise/fatigue, nausea, near-syncope, numbness, orthopnea, palpitations, PND, shortness of breath, syncope, vomiting or weakness.   Her past medical history is significant for CAD, CHF and hyperlipidemia.   Pertinent negatives for past medical history include no diabetes and no muscle weakness.   Hypertension   This is a chronic problem. The current episode started more than 1 year ago. The problem is unchanged. The problem is uncontrolled (usually 120-160/60-80 mmHg at home). Pertinent negatives include no anxiety, blurred vision, chest pain, headaches, malaise/fatigue, neck pain, orthopnea, palpitations, peripheral edema, PND, shortness of breath or  sweats. Past treatments include beta blockers, angiotensin blockers and diuretics. Compliance problems include medication side effects.  There is no history of chronic renal disease.   Hyperlipidemia   This is a chronic problem. The current episode started more than 1 year ago. The problem is uncontrolled. Recent lipid tests were reviewed and are normal. Exacerbating diseases include obesity and nephrotic syndrome. She has no history of chronic renal disease, diabetes, hypothyroidism or liver disease. Pertinent negatives include no chest pain, focal sensory loss, focal weakness, leg pain, myalgias or shortness of breath.       Review of Systems   Constitution: Negative for chills, fatigue, fever, weakness, malaise/fatigue, unexpected weight change and weight gain.   HENT: Negative for nosebleeds.    Eyes: Negative for blurred vision, discharge, double vision, pain, vision loss in left eye and vision loss in right eye.   Cardiovascular: Positive for dyspnea on exertion. Negative for chest pain, claudication, irregular heartbeat, leg swelling, near-syncope, orthopnea, palpitations, paroxysmal nocturnal dyspnea and syncope.   Respiratory: Negative for chest tightness, cough, hemoptysis, shortness of breath and wheezing.    Endocrine: Negative for cold intolerance and heat intolerance.   Hematologic/Lymphatic: Negative for bleeding problem. Does not bruise/bleed easily.   Skin: Negative for color change and rash.   Musculoskeletal: Positive for arthritis. Negative for back pain, falls, joint swelling, muscle weakness, myalgias and neck pain.   Gastrointestinal: Negative for abdominal pain, heartburn, hematemesis, hematochezia, hemorrhoids, jaundice, melena, nausea and vomiting.   Genitourinary: Negative for dysuria, hematuria and nocturia.   Neurological: Negative for dizziness, focal weakness, headaches, light-headedness, loss of balance, numbness and vertigo.   Psychiatric/Behavioral: Negative for altered mental  "status, depression and memory loss. The patient is not nervous/anxious.    Allergic/Immunologic: Negative for hives and persistent infections.       Current Outpatient Medications on File Prior to Visit   Medication Sig Dispense Refill    aspirin (ECOTRIN) 81 MG EC tablet Take 1 tablet (81 mg total) by mouth once daily. 90 tablet 3    clopidogrel (PLAVIX) 75 mg tablet Take 1 tablet (75 mg total) by mouth once daily. 90 tablet 3    clorazepate (TRANXENE) 3.75 MG Tab Take 7.5 mg by mouth 3 (three) times daily.      losartan (COZAAR) 100 MG tablet Take 1 tablet (100 mg total) by mouth once daily. 90 tablet 3    metoprolol succinate (TOPROL-XL) 50 MG 24 hr tablet Take 1 tablet (50 mg total) by mouth once daily. 90 tablet 3    oxybutynin (DITROPAN-XL) 5 MG TR24 Take 5 mg by mouth once daily.      spironolactone (ALDACTONE) 25 MG tablet Take 1 tablet (25 mg total) by mouth once daily. 90 tablet 3    torsemide (DEMADEX) 10 MG Tab Take 1 tablet (10 mg total) by mouth once daily. 90 tablet 3     No current facility-administered medications on file prior to visit.        BP (!) 140/73   Pulse 64   Ht 5' 4" (1.626 m)   Wt 80.3 kg (177 lb)   BMI 30.38 kg/m²       Objective:     Physical Exam   Constitutional: She is oriented to person, place, and time. She appears well-developed and well-nourished.  Non-toxic appearance. No distress.   HENT:   Head: Normocephalic and atraumatic.   Nose: Nose normal.   Eyes: Conjunctivae are normal. Right eye exhibits no discharge. Left eye exhibits no discharge. Right conjunctiva is not injected. Left conjunctiva is not injected. Right pupil is round. Left pupil is round. Pupils are equal.   Neck: Neck supple. No JVD present. Carotid bruit is not present. No thyromegaly present.   Left CEA scar.   Cardiovascular: Normal rate, regular rhythm, S1 normal and S2 normal.  No extrasystoles are present. PMI is not displaced. Exam reveals gallop and S4. Exam reveals no S3.   Murmur heard.   " Medium-pitched midsystolic murmur is present with a grade of 2/6 at the upper right sternal border.  Pulses:       Radial pulses are 2+ on the right side, and 2+ on the left side.        Femoral pulses are 2+ on the right side, and 2+ on the left side.       Dorsalis pedis pulses are 1+ on the right side, and 1+ on the left side.        Posterior tibial pulses are 1+ on the right side, and 1+ on the left side.   Pulmonary/Chest: Effort normal and breath sounds normal.   Abdominal: Soft. Normal appearance. There is no hepatosplenomegaly. There is no tenderness.   Musculoskeletal:        Right ankle: She exhibits no swelling, no ecchymosis and no deformity.        Left ankle: She exhibits no swelling, no ecchymosis and no deformity.   Lymphadenopathy:        Head (right side): No submandibular adenopathy present.        Head (left side): No submandibular adenopathy present.     She has no cervical adenopathy.   Neurological: She is alert and oriented to person, place, and time. She is not disoriented. No cranial nerve deficit or sensory deficit.   Skin: Skin is warm, dry and intact. No rash noted. She is not diaphoretic.   Psychiatric: She has a normal mood and affect. Her speech is normal and behavior is normal. Judgment and thought content normal. Cognition and memory are normal.       Assessment:      1. Coronary artery disease involving native coronary artery of native heart without angina pectoris    2. History of myocardial infarction    3. History of coronary artery stent placement    4. Heart failure, diastolic, chronic    5. Aortic valve disease    6. History of chest pain    7. Bilateral carotid artery stenosis    8. Essential hypertension    9. Hypercholesterolemia    10. Mild obesity        Plan:     1. Coronary Artery Disease              11/27/2017: NSTEMI. Troponin >50.              11/28/2017: OB: Cath: LAD: 80% involving D1. D1 osteal 80%. LV: Normal systolic function. LAD: LM 3.0 x 18 mm. D1: PTCA  2.5 mm.              On aspirin 81 mg Q24.              12/2018: Stop clopidogrel 75 mg Q24.   Doing well.     2. Heart Failure, Diastolic, Chronic              6/14/2013: Echo: Normal LV size and function. Mild aortic valve sclerosis with mild AR.              7/7/2017: Echo: Normal left ventricular size and systolic function. Mild LVH. Moderate diastolic dysfunction. Moderately dilated LA. Mild AS - 1.7 m/s. SPAP 48 mmHg.   5/16/2018: Echo: Normal left ventricular size and systolic function. Mild LVH. Mild diastolic dysfunction. Moderately dilated LA. Mild aortic valve sclerosis - 1.9 m/s.              On torsemide 10 mg Q24 and spironolactone 25 mg Q24.              Leg edema appeared at least partially related to amlodipine.              Well compensated.     3. Aortic Valve Disease              6/14/2013: Echo: Mild aortic valve sclerosis with mild AR.              7/7/2017: Echo: Mild AS - 1.7 m/s.   5/16/2018: Echo: Mild aortic valve sclerosis - 1.9 m/s.              Stable.     4. History of Chest Pain              2017: Began experience chest discomfort.              8/9/2017: Stress Echo: 3:00 min. No CP. ECG negative. Echo negative.              Resolved.     5. Carotid Artery Disease              2/3/2011: Left CEA for 80% stenosis with small ulcer. Had mild WILLIAM disease.              3/20/2012: Carotid Duplex: Mild disease.              5/6/2014: Carotid Duplex: WILLIAM: moderate plaquing -1.2 m/s - 50-60%. LICA: moderate plaquing - tortuous - 1.6 m/s - 50-60%.              5/5/2015: Carotid Duplex: Moderate plaquing.               7/7/2017: Carotid Duplex: WILLIAM: Moderate plaquing - 1.3 m/s - 50-60%. LICA: Moderate plaquing - 1.8 m/s - 60-70%.   8/15/2018: Carotid Duplex: WILLIAM: Moderate plaquing - 1.5 m/s - 50-60%. LICA: Moderate plaquing - 1.3 m/s - 50-60%.              On aspirin 81 mg Q24              8/2019: Plan next Carotid Duplex. Then yearly.     6. Hypertension              1955: Diagnosed.               Severe hypertension.   Could not tolerate carvedilol or amlodipine.              On losartan 100 mg Q24, metoprolol 50 mg Q24, spironolactone 25 mg Q24 and torsemide 10 mg Q24.   Keeping log at home.               Fair well controlled overall.                 7. Hypercholesterolemia              2013: Could not tolerate statin due to severe muscle pains.              Absolutely against trying any statin.     8. Mild Obesity              6/27/2017: Weight 82 kg. BMI 31.              Encouraged to lose weight.     9. Primary Care              Dr. Donald Thibodeaux.    F/u 4 months.    Ken Wilson M.D.

## 2019-05-10 ENCOUNTER — TELEPHONE (OUTPATIENT)
Dept: CARDIOLOGY | Facility: CLINIC | Age: 84
End: 2019-05-10

## 2019-05-10 NOTE — TELEPHONE ENCOUNTER
----- Message from Azalia Leslie sent at 5/10/2019 11:49 AM CDT -----  Wants to know if she can take at supplement called glucosamine chondroitin  [phone]

## 2019-06-05 ENCOUNTER — OFFICE VISIT (OUTPATIENT)
Dept: CARDIOLOGY | Facility: CLINIC | Age: 84
End: 2019-06-05
Attending: INTERNAL MEDICINE
Payer: MEDICARE

## 2019-06-05 VITALS
HEIGHT: 64 IN | BODY MASS INDEX: 30.39 KG/M2 | HEART RATE: 54 BPM | SYSTOLIC BLOOD PRESSURE: 140 MMHG | WEIGHT: 178 LBS | DIASTOLIC BLOOD PRESSURE: 62 MMHG

## 2019-06-05 DIAGNOSIS — I35.9 AORTIC VALVE DISEASE: ICD-10-CM

## 2019-06-05 DIAGNOSIS — E78.00 HYPERCHOLESTEROLEMIA: ICD-10-CM

## 2019-06-05 DIAGNOSIS — I25.10 CORONARY ARTERY DISEASE INVOLVING NATIVE CORONARY ARTERY OF NATIVE HEART WITHOUT ANGINA PECTORIS: ICD-10-CM

## 2019-06-05 DIAGNOSIS — E66.9 MILD OBESITY: ICD-10-CM

## 2019-06-05 DIAGNOSIS — Z87.898 HISTORY OF CHEST PAIN: ICD-10-CM

## 2019-06-05 DIAGNOSIS — Z95.5 HISTORY OF CORONARY ARTERY STENT PLACEMENT: ICD-10-CM

## 2019-06-05 DIAGNOSIS — I10 ESSENTIAL HYPERTENSION: ICD-10-CM

## 2019-06-05 DIAGNOSIS — I50.32 HEART FAILURE, DIASTOLIC, CHRONIC: ICD-10-CM

## 2019-06-05 DIAGNOSIS — I65.23 BILATERAL CAROTID ARTERY STENOSIS: ICD-10-CM

## 2019-06-05 DIAGNOSIS — I25.2 HISTORY OF MYOCARDIAL INFARCTION: ICD-10-CM

## 2019-06-05 PROCEDURE — 99214 OFFICE O/P EST MOD 30 MIN: CPT | Mod: S$GLB,,, | Performed by: INTERNAL MEDICINE

## 2019-06-05 PROCEDURE — 99214 PR OFFICE/OUTPT VISIT, EST, LEVL IV, 30-39 MIN: ICD-10-PCS | Mod: S$GLB,,, | Performed by: INTERNAL MEDICINE

## 2019-06-05 RX ORDER — LOSARTAN POTASSIUM 100 MG/1
100 TABLET ORAL DAILY
Qty: 90 TABLET | Refills: 3 | Status: SHIPPED | OUTPATIENT
Start: 2019-06-05 | End: 2020-05-22

## 2019-06-05 RX ORDER — METOPROLOL SUCCINATE 50 MG/1
50 TABLET, EXTENDED RELEASE ORAL DAILY
Qty: 90 TABLET | Refills: 3 | Status: SHIPPED | OUTPATIENT
Start: 2019-06-05 | End: 2020-03-13

## 2019-06-05 RX ORDER — ASPIRIN 81 MG/1
81 TABLET ORAL DAILY
Qty: 90 TABLET | Refills: 3 | COMMUNITY
Start: 2019-06-05 | End: 2019-10-16

## 2019-06-05 RX ORDER — SPIRONOLACTONE 25 MG/1
25 TABLET ORAL DAILY
Qty: 90 TABLET | Refills: 3 | Status: SHIPPED | OUTPATIENT
Start: 2019-06-05 | End: 2020-02-17

## 2019-06-05 RX ORDER — TORSEMIDE 10 MG/1
10 TABLET ORAL DAILY
Qty: 90 TABLET | Refills: 3 | Status: SHIPPED | OUTPATIENT
Start: 2019-06-05 | End: 2020-02-17

## 2019-06-05 NOTE — PROGRESS NOTES
Subjective:     Debo Ceballos is a 87 y.o. female with long history of difficult to control hypertension and hypercholesterolemia. She is mildly obese. She has severe carotid artery disease and underwent left carotid endarterectomy in 2011. She cannot tolerate statins as that causes severe muscle pains. She could not tolerate carvedilol either as she felt that caused her to get an unsteady gait and upset stomach. She could not tolerate amlodipine as that caused leg edema. She had an MRI for questionable slurred speech in 4/2013 which revealed generalized atrophy. Beginning in 7/2017 after her  passed she experienced occasional mild chest pressure. On 8/9/2017 she had a Stress Echo and was able to do 3:00 minutes without any CP and the ECG was negative as was the Echo.  The chest discomfort resolved. On 11/27/2017 after visiting her podiatrist she walked out to her car. She fairly suddenly felt a mild chest pressure with a discomfort in the back of her neck. She denied any dyspnea. She felt weak and thought she may pass out. She called for help and was brought to the ER. She had no further chest discomfort. The ECG did not reveal any clear acute changes. Troponin however was elevated to 2.0. She was admitted.  On 11/28/2017 she underwent coronary angiography that revealed a severe LAD/D1 bifurcation lesion. The LAD was stented and the D1 dilated using a two wire technique. She felt well following the intervention. The troponin vanna to >50 following the intervention which was not expected and felt to possibly be related to reperfusion issues. There were no wall motion abnormality seen. She had some ecchymosis in the right groin. Due to a high blood pressure spironolactone was addded to her antihypertensive regimen. She did not want to try a statin. The importance of DAPT was stressed. On 5/16/2018 she had an Echo that revealed normal left ventricular size and systolic function with mild LVH. There was  mild diastolic dysfunction and the LA was moderately dilated. In addition there was mild aortic valve sclerosis with a peak velocity of 1.9 m/s. No exertional chest discomfort but mild exertional dyspnea. No palpitations or weak spells. No bleeding. Feeling well overall.       Coronary Artery Disease   Presents for follow-up visit. Pertinent negatives include no chest pain, chest pressure, chest tightness, dizziness, leg swelling, muscle weakness, palpitations, shortness of breath or weight gain. Risk factors include hyperlipidemia. Risk factors do not include obesity. Her past medical history is significant for CHF. The symptoms have been stable.   Chest Pain    This is a chronic problem. The current episode started more than 1 year ago. The problem has been resolved. Pertinent negatives include no abdominal pain, back pain, claudication, cough, dizziness, fever, headaches, hemoptysis, irregular heartbeat, leg pain, malaise/fatigue, nausea, near-syncope, numbness, orthopnea, palpitations, PND, shortness of breath, syncope, vomiting or weakness.   Her past medical history is significant for CAD, CHF and hyperlipidemia.   Pertinent negatives for past medical history include no diabetes and no muscle weakness.   Congestive Heart Failure   Presents for follow-up visit. Pertinent negatives include no abdominal pain, chest pain, chest pressure, claudication, edema, fatigue, muscle weakness, near-syncope, nocturia, orthopnea, palpitations, paroxysmal nocturnal dyspnea, shortness of breath or unexpected weight change. The symptoms have been stable. Her past medical history is significant for CAD.   Hypertension   The problem has been resolved since onset. The problem is uncontrolled. Pertinent negatives include no anxiety, blurred vision, chest pain, headaches, malaise/fatigue, neck pain, orthopnea, palpitations, peripheral edema, PND, shortness of breath or sweats. There is no history of chronic renal disease.    Hyperlipidemia   Recent lipid tests were reviewed and are high. She has no history of chronic renal disease, diabetes, hypothyroidism, liver disease, obesity or nephrotic syndrome. Pertinent negatives include no chest pain, focal sensory loss, focal weakness, leg pain, myalgias or shortness of breath.       Review of Systems   Constitution: Negative for chills, fatigue, fever, malaise/fatigue, unexpected weight change and weight gain.   HENT: Negative for nosebleeds.    Eyes: Negative for blurred vision, double vision, vision loss in left eye and vision loss in right eye.   Cardiovascular: Negative for chest pain, claudication, dyspnea on exertion, irregular heartbeat, leg swelling, near-syncope, orthopnea, palpitations, paroxysmal nocturnal dyspnea and syncope.   Respiratory: Negative for chest tightness, cough, hemoptysis, shortness of breath and wheezing.    Endocrine: Negative for cold intolerance and heat intolerance.   Hematologic/Lymphatic: Negative for bleeding problem. Does not bruise/bleed easily.   Skin: Negative for color change and rash.   Musculoskeletal: Negative for back pain, falls, muscle weakness, myalgias and neck pain.   Gastrointestinal: Negative for abdominal pain, heartburn, hematemesis, hematochezia, hemorrhoids, jaundice, melena, nausea and vomiting.   Genitourinary: Negative for dysuria, hematuria and nocturia.   Neurological: Negative for dizziness, focal weakness, headaches, light-headedness, loss of balance, numbness, vertigo and weakness.   Psychiatric/Behavioral: Negative for altered mental status, depression and memory loss. The patient is not nervous/anxious.    Allergic/Immunologic: Negative for hives and persistent infections.       Current Outpatient Medications on File Prior to Visit   Medication Sig Dispense Refill    aspirin (ECOTRIN) 81 MG EC tablet Take 1 tablet (81 mg total) by mouth once daily. 90 tablet 3    clorazepate (TRANXENE) 3.75 MG Tab Take 7.5 mg by mouth 3  "(three) times daily.      losartan (COZAAR) 100 MG tablet Take 1 tablet (100 mg total) by mouth once daily. 90 tablet 3    metoprolol succinate (TOPROL-XL) 50 MG 24 hr tablet Take 1 tablet (50 mg total) by mouth once daily. 90 tablet 3    oxybutynin (DITROPAN-XL) 5 MG TR24 Take 5 mg by mouth once daily.      spironolactone (ALDACTONE) 25 MG tablet Take 1 tablet (25 mg total) by mouth once daily. 90 tablet 3    torsemide (DEMADEX) 10 MG Tab Take 1 tablet (10 mg total) by mouth once daily. 90 tablet 3     No current facility-administered medications on file prior to visit.        BP (!) 140/62   Pulse (!) 54   Ht 5' 4" (1.626 m)   Wt 80.7 kg (178 lb)   BMI 30.55 kg/m²       Objective:     Physical Exam   Constitutional: She is oriented to person, place, and time. She appears well-developed and well-nourished.  Non-toxic appearance. No distress.   HENT:   Head: Normocephalic and atraumatic.   Nose: Nose normal.   Eyes: Right eye exhibits no discharge. Left eye exhibits no discharge. Right conjunctiva is not injected. Left conjunctiva is not injected. Right pupil is round. Left pupil is round. Pupils are equal.   Neck: Neck supple. No JVD present. Carotid bruit is not present. No thyromegaly present.   Cardiovascular: Normal rate, regular rhythm, S1 normal and S2 normal.  No extrasystoles are present. PMI is not displaced. Exam reveals no gallop.   Pulses:       Radial pulses are 2+ on the right side, and 2+ on the left side.        Femoral pulses are 2+ on the right side, and 2+ on the left side.       Dorsalis pedis pulses are 2+ on the right side, and 2+ on the left side.        Posterior tibial pulses are 2+ on the right side, and 2+ on the left side.   Pulmonary/Chest: Effort normal and breath sounds normal.   Abdominal: Soft. Normal appearance. There is no hepatosplenomegaly. There is no tenderness.   Musculoskeletal:        Right ankle: She exhibits no swelling, no ecchymosis and no deformity.        " Left ankle: She exhibits no swelling, no ecchymosis and no deformity.   Lymphadenopathy:        Head (right side): No submandibular adenopathy present.        Head (left side): No submandibular adenopathy present.     She has no cervical adenopathy.   Neurological: She is alert and oriented to person, place, and time. She is not disoriented. No cranial nerve deficit.   Skin: Skin is warm, dry and intact. No rash noted. She is not diaphoretic. No cyanosis. Nails show no clubbing.   Psychiatric: She has a normal mood and affect. Her speech is normal and behavior is normal. Judgment and thought content normal. Cognition and memory are normal.         Assessment:     1. Coronary artery disease involving native coronary artery of native heart without angina pectoris    2. History of myocardial infarction    3. History of coronary artery stent placement    4. History of chest pain    5. Heart failure, diastolic, chronic    6. Aortic valve disease    7. Bilateral carotid artery stenosis    8. Essential hypertension    9. Hypercholesterolemia    10. Mild obesity        Plan:   l  1. Coronary Artery Disease              11/27/2017: NSTEMI. Troponin >50.              11/28/2017: [facility] [place]: Cath: LAD: 80% involving D1. D1 osteal 80%. LV: Normal systolic function. LAD: LM 3.0 x 18 mm. D1: PTCA 2.5 mm.              On aspirin 81 mg Q24.              Doing well.     2. Heart Failure, Diastolic, Chronic              6/14/2013: Echo: Normal LV size and function. Mild aortic valve sclerosis with mild AR.              7/7/2017: Echo: Normal left ventricular size and systolic function. Mild LVH. Moderate diastolic dysfunction. Moderately dilated LA. Mild AS - 1.7 m/s. SPAP 48 mmHg.              5/16/2018: Echo: Normal left ventricular size and systolic function. Mild LVH. Mild diastolic dysfunction. Moderately dilated LA. Mild aortic valve sclerosis - 1.9 m/s.   Leg edema appeared at least partially related to amlodipine.              On  torsemide 10 mg Q24 and spironolactone 25 mg Q24.              Well compensated.                3. Aortic Valve Disease              6/14/2013: Echo: Mild aortic valve sclerosis with mild AR.              7/7/2017: Echo: Mild AS - 1.7 m/s.              5/16/2018: Echo: Mild aortic valve sclerosis - 1.9 m/s.              Stable.     4. History of Chest Pain              2017: Began experience chest discomfort.              8/9/2017: Stress Echo: 3:00 min. No CP. ECG negative. Echo negative.              Resolved.     5. Carotid Artery Disease              2/3/2011: Left CEA for 80% stenosis with small ulcer. Had mild WILLIAM disease.              3/20/2012: Carotid Duplex: Mild disease.              5/6/2014: Carotid Duplex: WILLIAM: moderate plaquing -1.2 m/s - 50-60%. LICA: moderate plaquing - tortuous - 1.6 m/s - 50-60%.              5/5/2015: Carotid Duplex: Moderate plaquing.               7/7/2017: Carotid Duplex: WILLIAM: Moderate plaquing - 1.3 m/s - 50-60%. LICA: Moderate plaquing - 1.8 m/s - 60-70%.              8/15/2018: Carotid Duplex: WILLIAM: Moderate plaquing - 1.5 m/s - 50-60%. LICA: Moderate plaquing - 1.3 m/s - 50-60%.              On aspirin 81 mg Q24              8/2019: Plan next Carotid Duplex. Then yearly.     6. Hypertension              1955: Diagnosed.              Severe hypertension.              Could not tolerate carvedilol or amlodipine.              On metoprolol 50 mg Q24, losartan 100 mg Q24, spironolactone 25 mg Q24 and torsemide 10 mg Q24.              Keeping log at home.               Fairly well controlled overall.                 7. Hypercholesterolemia              2013: Could not tolerate statin due to severe muscle pains.              Absolutely against trying any statin.     8. Mild Obesity              6/27/2017: Weight 82 kg. BMI 31.              Encouraged to lose weight.     9. Primary Care              Dr. Donald Thibodeaux.     F/u 4 months.     Ken Wilson M.D.      8/11/2019  3:08 PM, Addendum:    8/7/2019: Carotid Duplex: WILLIAM: Moderate plaquing - 1.4 m/s - 50-60%. LICA: Moderate plaquing - 1.5 m/s - 50-60%.    I discussed the above test result and the implications of the findings over the phone.    Ken Wilson M.D.

## 2019-08-07 ENCOUNTER — CLINICAL SUPPORT (OUTPATIENT)
Dept: CARDIOLOGY | Facility: CLINIC | Age: 84
End: 2019-08-07
Attending: INTERNAL MEDICINE
Payer: MEDICARE

## 2019-08-07 DIAGNOSIS — I65.23 BILATERAL CAROTID ARTERY STENOSIS: ICD-10-CM

## 2019-08-07 PROCEDURE — 93880 PR DUPLEX SCAN EXTRACRANIAL,BILAT: ICD-10-PCS | Mod: S$GLB,,, | Performed by: INTERNAL MEDICINE

## 2019-08-07 PROCEDURE — 93880 EXTRACRANIAL BILAT STUDY: CPT | Mod: S$GLB,,, | Performed by: INTERNAL MEDICINE

## 2019-09-05 ENCOUNTER — TELEPHONE (OUTPATIENT)
Dept: CARDIOLOGY | Facility: CLINIC | Age: 84
End: 2019-09-05

## 2019-09-05 NOTE — TELEPHONE ENCOUNTER
----- Message from Azalia Leslie sent at 9/5/2019  2:31 PM CDT -----  Wants to know if can get letter stating that you clear to fly to [place]

## 2019-09-05 NOTE — TELEPHONE ENCOUNTER
"Patient's daughter is buying "flying insurance" and needs letter stating "okay" to fly to [place].    Please advise.  "

## 2019-10-16 ENCOUNTER — OFFICE VISIT (OUTPATIENT)
Dept: CARDIOLOGY | Facility: CLINIC | Age: 84
End: 2019-10-16
Attending: INTERNAL MEDICINE
Payer: MEDICARE

## 2019-10-16 VITALS
HEART RATE: 50 BPM | BODY MASS INDEX: 30.22 KG/M2 | HEIGHT: 64 IN | DIASTOLIC BLOOD PRESSURE: 74 MMHG | WEIGHT: 177 LBS | SYSTOLIC BLOOD PRESSURE: 140 MMHG

## 2019-10-16 DIAGNOSIS — E78.00 HYPERCHOLESTEROLEMIA: ICD-10-CM

## 2019-10-16 DIAGNOSIS — I25.10 CORONARY ARTERY DISEASE INVOLVING NATIVE CORONARY ARTERY OF NATIVE HEART WITHOUT ANGINA PECTORIS: ICD-10-CM

## 2019-10-16 DIAGNOSIS — Z95.5 HISTORY OF CORONARY ARTERY STENT PLACEMENT: ICD-10-CM

## 2019-10-16 DIAGNOSIS — I10 ESSENTIAL HYPERTENSION: ICD-10-CM

## 2019-10-16 DIAGNOSIS — I50.32 HEART FAILURE, DIASTOLIC, CHRONIC: ICD-10-CM

## 2019-10-16 DIAGNOSIS — I35.9 AORTIC VALVE DISEASE: ICD-10-CM

## 2019-10-16 DIAGNOSIS — E66.9 MILD OBESITY: ICD-10-CM

## 2019-10-16 DIAGNOSIS — I65.23 BILATERAL CAROTID ARTERY STENOSIS: ICD-10-CM

## 2019-10-16 DIAGNOSIS — Z87.898 HISTORY OF CHEST PAIN: ICD-10-CM

## 2019-10-16 DIAGNOSIS — I25.2 HISTORY OF MYOCARDIAL INFARCTION: ICD-10-CM

## 2019-10-16 PROCEDURE — 99214 PR OFFICE/OUTPT VISIT, EST, LEVL IV, 30-39 MIN: ICD-10-PCS | Mod: S$GLB,,, | Performed by: INTERNAL MEDICINE

## 2019-10-16 PROCEDURE — 99214 OFFICE O/P EST MOD 30 MIN: CPT | Mod: S$GLB,,, | Performed by: INTERNAL MEDICINE

## 2020-02-15 DIAGNOSIS — I50.32 HEART FAILURE, DIASTOLIC, CHRONIC: ICD-10-CM

## 2020-02-15 DIAGNOSIS — I10 ESSENTIAL HYPERTENSION: ICD-10-CM

## 2020-02-17 RX ORDER — TORSEMIDE 10 MG/1
TABLET ORAL
Qty: 90 TABLET | Refills: 3 | Status: SHIPPED | OUTPATIENT
Start: 2020-02-17 | End: 2020-08-10 | Stop reason: SDUPTHER

## 2020-02-17 RX ORDER — SPIRONOLACTONE 25 MG/1
25 TABLET ORAL DAILY
Qty: 90 TABLET | Refills: 3 | Status: SHIPPED | OUTPATIENT
Start: 2020-02-17 | End: 2020-08-10 | Stop reason: SDUPTHER

## 2020-03-13 DIAGNOSIS — I10 ESSENTIAL HYPERTENSION: ICD-10-CM

## 2020-03-13 RX ORDER — METOPROLOL SUCCINATE 50 MG/1
TABLET, EXTENDED RELEASE ORAL
Qty: 90 TABLET | Refills: 3 | Status: SHIPPED | OUTPATIENT
Start: 2020-03-13 | End: 2020-08-10 | Stop reason: SDUPTHER

## 2020-04-17 RX ORDER — OXYBUTYNIN CHLORIDE 5 MG/1
TABLET, EXTENDED RELEASE ORAL
Qty: 90 TABLET | Refills: 3 | Status: SHIPPED | OUTPATIENT
Start: 2020-04-17 | End: 2021-02-05

## 2020-05-22 DIAGNOSIS — I10 ESSENTIAL HYPERTENSION: ICD-10-CM

## 2020-05-22 RX ORDER — LOSARTAN POTASSIUM 100 MG/1
TABLET ORAL
Qty: 90 TABLET | Refills: 3 | Status: SHIPPED | OUTPATIENT
Start: 2020-05-22 | End: 2020-08-10 | Stop reason: SDUPTHER

## 2020-08-10 ENCOUNTER — OFFICE VISIT (OUTPATIENT)
Dept: CARDIOLOGY | Facility: CLINIC | Age: 85
End: 2020-08-10
Attending: INTERNAL MEDICINE
Payer: MEDICARE

## 2020-08-10 VITALS
DIASTOLIC BLOOD PRESSURE: 73 MMHG | HEIGHT: 64 IN | SYSTOLIC BLOOD PRESSURE: 175 MMHG | BODY MASS INDEX: 30.11 KG/M2 | HEART RATE: 55 BPM | WEIGHT: 176.38 LBS

## 2020-08-10 DIAGNOSIS — I35.9 AORTIC VALVE DISEASE: ICD-10-CM

## 2020-08-10 DIAGNOSIS — Z87.898 HISTORY OF CHEST PAIN: ICD-10-CM

## 2020-08-10 DIAGNOSIS — I65.23 BILATERAL CAROTID ARTERY STENOSIS: ICD-10-CM

## 2020-08-10 DIAGNOSIS — I25.10 CORONARY ARTERY DISEASE INVOLVING NATIVE CORONARY ARTERY OF NATIVE HEART WITHOUT ANGINA PECTORIS: ICD-10-CM

## 2020-08-10 DIAGNOSIS — E78.00 HYPERCHOLESTEROLEMIA: ICD-10-CM

## 2020-08-10 DIAGNOSIS — I10 ESSENTIAL HYPERTENSION: ICD-10-CM

## 2020-08-10 DIAGNOSIS — Z95.5 HISTORY OF CORONARY ARTERY STENT PLACEMENT: ICD-10-CM

## 2020-08-10 DIAGNOSIS — E66.9 MILD OBESITY: ICD-10-CM

## 2020-08-10 DIAGNOSIS — I25.2 HISTORY OF MYOCARDIAL INFARCTION: ICD-10-CM

## 2020-08-10 DIAGNOSIS — I50.32 HEART FAILURE, DIASTOLIC, CHRONIC: ICD-10-CM

## 2020-08-10 PROCEDURE — 93010 PR ELECTROCARDIOGRAM REPORT: ICD-10-PCS | Mod: S$PBB,,, | Performed by: INTERNAL MEDICINE

## 2020-08-10 PROCEDURE — 99213 OFFICE O/P EST LOW 20 MIN: CPT | Mod: PBBFAC,25 | Performed by: INTERNAL MEDICINE

## 2020-08-10 PROCEDURE — 93005 ELECTROCARDIOGRAM TRACING: CPT | Mod: PBBFAC | Performed by: INTERNAL MEDICINE

## 2020-08-10 PROCEDURE — 99214 PR OFFICE/OUTPT VISIT, EST, LEVL IV, 30-39 MIN: ICD-10-PCS | Mod: S$PBB,25,, | Performed by: INTERNAL MEDICINE

## 2020-08-10 PROCEDURE — 99214 OFFICE O/P EST MOD 30 MIN: CPT | Mod: S$PBB,25,, | Performed by: INTERNAL MEDICINE

## 2020-08-10 PROCEDURE — 99999 PR PBB SHADOW E&M-EST. PATIENT-LVL III: ICD-10-PCS | Mod: PBBFAC,,, | Performed by: INTERNAL MEDICINE

## 2020-08-10 PROCEDURE — 99999 PR PBB SHADOW E&M-EST. PATIENT-LVL III: CPT | Mod: PBBFAC,,, | Performed by: INTERNAL MEDICINE

## 2020-08-10 PROCEDURE — 93010 ELECTROCARDIOGRAM REPORT: CPT | Mod: S$PBB,,, | Performed by: INTERNAL MEDICINE

## 2020-08-10 PROCEDURE — 93010 ELECTROCARDIOGRAM REPORT: CPT | Mod: 76,,, | Performed by: INTERNAL MEDICINE

## 2020-08-10 PROCEDURE — 93005 ELECTROCARDIOGRAM TRACING: CPT

## 2020-08-10 RX ORDER — LOSARTAN POTASSIUM 100 MG/1
100 TABLET ORAL DAILY
Qty: 90 TABLET | Refills: 3 | Status: SHIPPED | OUTPATIENT
Start: 2020-08-10 | End: 2021-04-08 | Stop reason: SDUPTHER

## 2020-08-10 RX ORDER — SPIRONOLACTONE 25 MG/1
25 TABLET ORAL DAILY
Qty: 90 TABLET | Refills: 3 | Status: SHIPPED | OUTPATIENT
Start: 2020-08-10 | End: 2021-04-08 | Stop reason: SDUPTHER

## 2020-08-10 RX ORDER — METOPROLOL SUCCINATE 50 MG/1
50 TABLET, EXTENDED RELEASE ORAL DAILY
Qty: 90 TABLET | Refills: 3 | Status: SHIPPED | OUTPATIENT
Start: 2020-08-10 | End: 2021-04-08 | Stop reason: SDUPTHER

## 2020-08-10 RX ORDER — TORSEMIDE 10 MG/1
10 TABLET ORAL DAILY
Qty: 90 TABLET | Refills: 3 | Status: SHIPPED | OUTPATIENT
Start: 2020-08-10 | End: 2021-04-08 | Stop reason: SDUPTHER

## 2020-08-10 NOTE — PROGRESS NOTES
Subjective:     Debo Ceballos is a 88 y.o. female with long history of difficult to control hypertension and hypercholesterolemia. She is mildly obese. She has severe carotid artery disease and underwent left carotid endarterectomy in 2011. She cannot tolerate statins as that causes severe muscle pains. She could not tolerate carvedilol either as she felt that caused her to get an unsteady gait and upset stomach. She could not tolerate amlodipine as that caused leg edema. She had an MRI for questionable slurred speech in 4/2013 which revealed generalized atrophy. Beginning in 7/2017 after her  passed she experienced occasional mild chest pressure. On 8/9/2017 she had a Stress Echo and was able to do 3:00 minutes without any CP and the ECG was negative as was the Echo.  The chest discomfort resolved. On 11/27/2017 after visiting her podiatrist she walked out to her car. She fairly suddenly felt a mild chest pressure with a discomfort in the back of her neck. She denied any dyspnea. She felt weak and thought she may pass out. She called for help and was brought to the ER. She had no further chest discomfort. The ECG did not reveal any clear acute changes. Troponin however was elevated to 2.0. She was admitted.  On 11/28/2017 she underwent coronary angiography that revealed a severe LAD/D1 bifurcation lesion. The LAD was stented and the D1 dilated using a two wire technique. She felt well following the intervention. The troponin vanna to >50 following the intervention which was not expected and felt to possibly be related to reperfusion issues. There were no wall motion abnormality seen. She had some ecchymosis in the right groin. Due to a high blood pressure spironolactone was addded to her antihypertensive regimen. She did not want to try a statin. The importance of DAPT was stressed. On 5/16/2018 she had an Echo that revealed normal left ventricular size and systolic function with mild LVH. There was  mild diastolic dysfunction and the LA was moderately dilated. In addition there was mild aortic valve sclerosis with a peak velocity of 1.9 m/s over the aortic valve.  In 2019 she began noticing occasional bruising of the left thumb that resolved as she discontinued aspirin. No exertional chest discomfort but mild exertional dyspnea. No palpitations or weak spells. No bleeding. Feeling well overall.       Coronary Artery Disease  Presents for follow-up visit. Pertinent negatives include no chest pain, chest pressure, chest tightness, dizziness, leg swelling, muscle weakness, palpitations, shortness of breath or weight gain. Risk factors include hyperlipidemia. Risk factors do not include obesity. Her past medical history is significant for CHF. The symptoms have been stable.   Congestive Heart Failure  Presents for follow-up visit. Pertinent negatives include no abdominal pain, chest pain, chest pressure, claudication, edema, fatigue, muscle weakness, near-syncope, nocturia, orthopnea, palpitations, paroxysmal nocturnal dyspnea, shortness of breath or unexpected weight change. The symptoms have been stable. Her past medical history is significant for CAD.   Chest Pain   The current episode started more than 1 year ago. The problem has been resolved. Associated symptoms include back pain. Pertinent negatives include no abdominal pain, claudication, cough, diaphoresis, dizziness, exertional chest pressure, fever, headaches, hemoptysis, irregular heartbeat, leg pain, malaise/fatigue, nausea, near-syncope, numbness, orthopnea, palpitations, PND, shortness of breath, sputum production, syncope, vomiting or weakness.   Her past medical history is significant for CAD, CHF and hyperlipidemia.   Pertinent negatives for past medical history include no diabetes and no muscle weakness.   Hypertension  The problem has been resolved since onset. The problem is uncontrolled (usually 130-150/70-80 mmHg at home). Associated symptoms  include neck pain. Pertinent negatives include no anxiety, blurred vision, chest pain, headaches, malaise/fatigue, orthopnea, palpitations, peripheral edema, PND, shortness of breath or sweats. There is no history of chronic renal disease.   Hyperlipidemia  Recent lipid tests were reviewed and are high. She has no history of chronic renal disease, diabetes, hypothyroidism, liver disease, obesity or nephrotic syndrome. Pertinent negatives include no chest pain, focal sensory loss, focal weakness, leg pain, myalgias or shortness of breath.       Review of Systems   Constitution: Negative for chills, diaphoresis, fatigue, fever, malaise/fatigue, unexpected weight change and weight gain.   HENT: Negative for nosebleeds.    Eyes: Negative for blurred vision, double vision, vision loss in left eye and vision loss in right eye.   Cardiovascular: Negative for chest pain, claudication, dyspnea on exertion, irregular heartbeat, leg swelling, near-syncope, orthopnea, palpitations, paroxysmal nocturnal dyspnea and syncope.   Respiratory: Negative for chest tightness, cough, hemoptysis, shortness of breath, sputum production and wheezing.    Endocrine: Negative for cold intolerance and heat intolerance.   Hematologic/Lymphatic: Negative for bleeding problem. Does not bruise/bleed easily.   Skin: Negative for color change and rash.   Musculoskeletal: Positive for back pain and neck pain. Negative for falls, muscle weakness and myalgias.   Gastrointestinal: Negative for abdominal pain, heartburn, hematemesis, hematochezia, hemorrhoids, jaundice, melena, nausea and vomiting.   Genitourinary: Negative for dysuria, hematuria and nocturia.   Neurological: Negative for dizziness, focal weakness, headaches, light-headedness, loss of balance, numbness, vertigo and weakness.   Psychiatric/Behavioral: Negative for altered mental status, depression and memory loss. The patient is not nervous/anxious.    Allergic/Immunologic: Negative for  "hives and persistent infections.       Current Outpatient Medications on File Prior to Visit   Medication Sig Dispense Refill    losartan (COZAAR) 100 MG tablet TAKE 1 TABLET BY MOUTH ONCE DAILY 90 tablet 3    metoprolol succinate (TOPROL-XL) 50 MG 24 hr tablet TAKE 1 TABLET BY MOUTH ONCE DAILY 90 tablet 3    oxybutynin (DITROPAN-XL) 5 MG TR24 TAKE 1 TABLET BY MOUTH EVERY DAY 90 tablet 3    spironolactone (ALDACTONE) 25 MG tablet Take 1 tablet (25 mg total) by mouth once daily. 90 tablet 3    torsemide (DEMADEX) 10 MG Tab TAKE 1 TABLET BY MOUTH ONCE DAILY 90 tablet 3    clorazepate (TRANXENE) 3.75 MG Tab Take 7.5 mg by mouth 3 (three) times daily.       No current facility-administered medications on file prior to visit.        BP (!) 175/73 (BP Location: Left arm, Patient Position: Sitting)   Pulse (!) 55   Ht 5' 4" (1.626 m)   Wt 80 kg (176 lb 5.9 oz)   BMI 30.27 kg/m²       Objective:     Physical Exam   Constitutional: She is oriented to person, place, and time. She appears well-developed and well-nourished.  Non-toxic appearance. No distress.   HENT:   Head: Normocephalic and atraumatic.   Nose: Nose normal.   Eyes: Right eye exhibits no discharge. Left eye exhibits no discharge. Right conjunctiva is not injected. Left conjunctiva is not injected. Right pupil is round. Left pupil is round. Pupils are equal.   Neck: Neck supple. No JVD present. Carotid bruit is not present. No thyromegaly present.   Cardiovascular: Normal rate, regular rhythm, S1 normal and S2 normal.  No extrasystoles are present. PMI is not displaced. Exam reveals no gallop.   Pulses:       Radial pulses are 2+ on the right side and 2+ on the left side.        Femoral pulses are 2+ on the right side and 2+ on the left side.       Dorsalis pedis pulses are 2+ on the right side and 2+ on the left side.        Posterior tibial pulses are 2+ on the right side and 2+ on the left side.   Pulmonary/Chest: Effort normal and breath sounds " normal.   Abdominal: Soft. Normal appearance. There is no hepatosplenomegaly. There is no abdominal tenderness.   Musculoskeletal:      Right ankle: She exhibits no swelling, no ecchymosis and no deformity.      Left ankle: She exhibits no swelling, no ecchymosis and no deformity.   Lymphadenopathy:        Head (right side): No submandibular adenopathy present.        Head (left side): No submandibular adenopathy present.     She has no cervical adenopathy.   Neurological: She is alert and oriented to person, place, and time. She is not disoriented. No cranial nerve deficit.   Skin: Skin is warm, dry and intact. No ecchymosis and no rash noted. She is not diaphoretic.   Psychiatric: She has a normal mood and affect. Her speech is normal and behavior is normal. Judgment and thought content normal. Cognition and memory are normal.         Assessment:     1. Coronary artery disease involving native coronary artery of native heart without angina pectoris    2. History of myocardial infarction    3. History of coronary artery stent placement    4. History of chest pain    5. Heart failure, diastolic, chronic    6. Aortic valve disease    7. Bilateral carotid artery stenosis    8. Essential hypertension    9. Hypercholesterolemia    10. Mild obesity        Plan:     1. Coronary Artery Disease              11/27/2017: NSTEMI. Troponin >50.              11/28/2017: [facility] [place]: Cath: LAD: 80% involving D1. D1 osteal 80%. LV: Normal systolic function. LAD: LM 3.0 x 18 mm. D1: PTCA 2.5 mm.              2019: Aspirin 81 mg Q24 was discontinued due to easy bruising of fingers. She showed me images. Doubted benefit with aspirin >risk. Resolved.              Doing well.     2. Heart Failure, Diastolic, Chronic              6/14/2013: Echo: Normal LV size and function. Mild aortic valve sclerosis with mild AR.              7/7/2017: Echo: Normal left ventricular size and systolic function. Mild LVH. Moderate diastolic dysfunction.  Moderately dilated LA. Mild AS - 1.7 m/s. SPAP 48 mmHg.              5/16/2018: Echo: Normal left ventricular size and systolic function. Mild LVH. Mild diastolic dysfunction. Moderately dilated LA. Mild aortic valve sclerosis - 1.9 m/s.   Leg edema appeared at least partially related to amlodipine.              On torsemide 10 mg Q24 and spironolactone 25 mg Q24.              Well compensated.                3. Aortic Valve Disease              6/14/2013: Echo: Mild aortic valve sclerosis with mild AR.              7/7/2017: Echo: Mild AS - 1.7 m/s.              5/16/2018: Echo: Mild aortic valve sclerosis - 1.9 m/s.              Stable.     4. History of Chest Pain              2017: Began experience chest discomfort.              8/9/2017: Stress Echo: 3:00 min. No CP. ECG negative. Echo negative.              Resolved.     5. Carotid Artery Disease              2/3/2011: Left CEA for 80% stenosis with small ulcer. Had mild WILLIAM disease.              3/20/2012: Carotid Duplex: Mild disease.              5/6/2014: Carotid Duplex: WILLIAM: moderate plaquing -1.2 m/s - 50-60%. LICA: moderate plaquing - tortuous - 1.6 m/s - 50-60%.              5/5/2015: Carotid Duplex: Moderate plaquing.               7/7/2017: Carotid Duplex: WILLIAM: Moderate plaquing - 1.3 m/s - 50-60%. LICA: Moderate plaquing - 1.8 m/s - 60-70%.              8/15/2018: Carotid Duplex: WILLIAM: Moderate plaquing - 1.5 m/s - 50-60%. LICA: Moderate plaquing - 1.3 m/s - 50-60%.   8/7/2019: Carotid Duplex: WILLIAM: Moderate plaquing - 1.4 m/s - 50-60%. LICA: Moderate plaquing - 1.5 m/s - 50-60%.              No aspirin due to easy bleeding.               8/2020: Plan next Carotid Duplex. Then yearly.     6. Hypertension              1955: Diagnosed.              Severe hypertension.              Could not tolerate carvedilol or amlodipine.              On metoprolol 50 mg Q24, losartan 100 mg Q24, spironolactone 25 mg Q24 and torsemide 10 mg Q24.               "Keeping log at home.               Fairly well controlled overall.    Essentially "best possible".                7. Hypercholesterolemia              2013: Could not tolerate statin due to severe muscle pains.              Absolutely against trying any statin.     8. Mild Obesity              6/27/2017: Weight 82 kg. BMI 31.              Encouraged to lose weight.     9. Primary Care              Dr. Donald Thibodeaux.     F/u 4 months.     Ken Wilson M.D.      8/25/2020 4:28 PM, Addendum:    8/25/2020: Carotid Duplex: WILLIAM: Moderate plaquing - < 50%. LICA: Moderate plaquing - < 50%.    I discussed the above test result and the implications of the findings over the phone.    Ken Wilson M.D.    "

## 2020-08-25 ENCOUNTER — HOSPITAL ENCOUNTER (OUTPATIENT)
Dept: CARDIOLOGY | Facility: OTHER | Age: 85
Discharge: HOME | End: 2020-08-25
Attending: INTERNAL MEDICINE
Payer: MEDICARE

## 2020-08-25 DIAGNOSIS — I65.23 BILATERAL CAROTID ARTERY STENOSIS: ICD-10-CM

## 2020-08-25 LAB
LEFT ARM DIASTOLIC BLOOD PRESSURE: 75 MMHG
LEFT ARM SYSTOLIC BLOOD PRESSURE: 169 MMHG
LEFT CCA DIST DIAS: 7 CM/S
LEFT CCA DIST SYS: 54 CM/S
LEFT CCA PROX DIAS: 6 CM/S
LEFT CCA PROX SYS: 56 CM/S
LEFT ECA SYS: 66 CM/S
LEFT ICA DIST DIAS: 13 CM/S
LEFT ICA DIST SYS: 88 CM/S
LEFT ICA MID DIAS: 12 CM/S
LEFT ICA MID SYS: 66 CM/S
LEFT ICA PROX DIAS: 6 CM/S
LEFT ICA PROX SYS: 44 CM/S
LEFT VERTEBRAL DIAS: 9 CM/S
LEFT VERTEBRAL SYS: 51 CM/S
OHS CV CAROTID RIGHT ICA EDV HIGHEST: 15
OHS CV CAROTID ULTRASOUND LEFT ICA/CCA RATIO: 1.63
OHS CV CAROTID ULTRASOUND RIGHT ICA/CCA RATIO: 1.81
OHS CV PV CAROTID LEFT HIGHEST CCA: 56
OHS CV PV CAROTID LEFT HIGHEST ICA: 88
OHS CV PV CAROTID RIGHT HIGHEST CCA: 55
OHS CV PV CAROTID RIGHT HIGHEST ICA: 94
OHS CV US CAROTID LEFT HIGHEST EDV: 13
RIGHT ARM DIASTOLIC BLOOD PRESSURE: 79 MMHG
RIGHT ARM SYSTOLIC BLOOD PRESSURE: 189 MMHG
RIGHT CCA DIST DIAS: 10 CM/S
RIGHT CCA DIST SYS: 52 CM/S
RIGHT CCA PROX DIAS: 6 CM/S
RIGHT CCA PROX SYS: 55 CM/S
RIGHT ECA SYS: 74 CM/S
RIGHT ICA DIST DIAS: 15 CM/S
RIGHT ICA DIST SYS: 84 CM/S
RIGHT ICA MID DIAS: 11 CM/S
RIGHT ICA MID SYS: 60 CM/S
RIGHT ICA PROX DIAS: 14 CM/S
RIGHT ICA PROX SYS: 94 CM/S
RIGHT VERTEBRAL DIAS: 11 CM/S
RIGHT VERTEBRAL SYS: 70 CM/S

## 2020-08-25 PROCEDURE — 93880 EXTRACRANIAL BILAT STUDY: CPT

## 2020-08-25 PROCEDURE — 93880 EXTRACRANIAL BILAT STUDY: CPT | Mod: 26,,, | Performed by: INTERNAL MEDICINE

## 2020-08-25 PROCEDURE — 93880 CV US DOPPLER CAROTID (CUPID ONLY): ICD-10-PCS | Mod: 26,,, | Performed by: INTERNAL MEDICINE

## 2020-12-10 ENCOUNTER — OFFICE VISIT (OUTPATIENT)
Dept: CARDIOLOGY | Facility: CLINIC | Age: 85
End: 2020-12-10
Attending: INTERNAL MEDICINE
Payer: MEDICARE

## 2020-12-10 VITALS
BODY MASS INDEX: 30.38 KG/M2 | DIASTOLIC BLOOD PRESSURE: 75 MMHG | HEART RATE: 61 BPM | HEIGHT: 64 IN | WEIGHT: 177.94 LBS | SYSTOLIC BLOOD PRESSURE: 145 MMHG

## 2020-12-10 DIAGNOSIS — I25.10 CORONARY ARTERY DISEASE INVOLVING NATIVE CORONARY ARTERY OF NATIVE HEART WITHOUT ANGINA PECTORIS: ICD-10-CM

## 2020-12-10 DIAGNOSIS — I10 ESSENTIAL HYPERTENSION: ICD-10-CM

## 2020-12-10 DIAGNOSIS — I35.9 AORTIC VALVE DISEASE: ICD-10-CM

## 2020-12-10 DIAGNOSIS — Z95.5 HISTORY OF CORONARY ARTERY STENT PLACEMENT: ICD-10-CM

## 2020-12-10 DIAGNOSIS — I25.2 HISTORY OF MYOCARDIAL INFARCTION: ICD-10-CM

## 2020-12-10 DIAGNOSIS — E66.9 MILD OBESITY: ICD-10-CM

## 2020-12-10 DIAGNOSIS — E78.00 HYPERCHOLESTEROLEMIA: ICD-10-CM

## 2020-12-10 DIAGNOSIS — Z87.898 HISTORY OF CHEST PAIN: ICD-10-CM

## 2020-12-10 DIAGNOSIS — I50.32 HEART FAILURE, DIASTOLIC, CHRONIC: ICD-10-CM

## 2020-12-10 DIAGNOSIS — I65.23 BILATERAL CAROTID ARTERY STENOSIS: ICD-10-CM

## 2020-12-10 PROCEDURE — 99999 PR PBB SHADOW E&M-EST. PATIENT-LVL III: CPT | Mod: PBBFAC,,, | Performed by: INTERNAL MEDICINE

## 2020-12-10 PROCEDURE — 99999 PR PBB SHADOW E&M-EST. PATIENT-LVL III: ICD-10-PCS | Mod: PBBFAC,,, | Performed by: INTERNAL MEDICINE

## 2020-12-10 PROCEDURE — 99213 OFFICE O/P EST LOW 20 MIN: CPT | Mod: PBBFAC | Performed by: INTERNAL MEDICINE

## 2020-12-10 PROCEDURE — 99214 PR OFFICE/OUTPT VISIT, EST, LEVL IV, 30-39 MIN: ICD-10-PCS | Mod: S$PBB,,, | Performed by: INTERNAL MEDICINE

## 2020-12-10 PROCEDURE — 99214 OFFICE O/P EST MOD 30 MIN: CPT | Mod: S$PBB,,, | Performed by: INTERNAL MEDICINE

## 2020-12-10 NOTE — PROGRESS NOTES
Subjective:     Debo Ceballos is a 89 y.o. female with long history of difficult to control hypertension and hypercholesterolemia. She is mildly obese. She has severe carotid artery disease and underwent left carotid endarterectomy in 2011. She cannot tolerate statins as that causes severe muscle pains. She could not tolerate carvedilol either as she felt that caused her to get an unsteady gait and upset stomach. She could not tolerate amlodipine as that caused leg edema. She had an MRI for questionable slurred speech in 4/2013 which revealed generalized atrophy. Beginning in 7/2017 after her  passed she experienced occasional mild chest pressure. On 8/9/2017 she had a Stress Echo and was able to do 3:00 minutes without any CP and the ECG was negative as was the Echo.  The chest discomfort resolved. On 11/27/2017 after visiting her podiatrist she walked out to her car. She fairly suddenly felt a mild chest pressure with a discomfort in the back of her neck. She denied any dyspnea. She felt weak and thought she may pass out. She called for help and was brought to the ER. She had no further chest discomfort. The ECG did not reveal any clear acute changes. Troponin however was elevated to 2.0. She was admitted. On 11/28/2017 she underwent coronary angiography that revealed a severe LAD/D1 bifurcation lesion. The LAD was stented and the D1 dilated using a two wire technique. She felt well following the intervention. The troponin vanna to >50 following the intervention which was not expected and felt to possibly be related to reperfusion issues. There were no wall motion abnormality seen. She had some ecchymosis in the right groin. Due to a high blood pressure spironolactone was addded to her antihypertensive regimen. She did not want to try a statin. The importance of DAPT was stressed. On 5/16/2018 she had an Echo that revealed normal left ventricular size and systolic function with mild LVH. There was  mild diastolic dysfunction and the LA was moderately dilated. In addition there was mild aortic valve sclerosis with a peak velocity of 1.9 m/s over the aortic valve. In 2019 she began noticing occasional bruising of the left thumb that resolved as she discontinued aspirin. No exertional chest discomfort but mild exertional dyspnea. No palpitations or weak spells. No bleeding. On 12/10/2020 she is worried about side effects from several of her medications. Feeling well overall.       Coronary Artery Disease  Presents for follow-up visit. Symptoms include chest pain. Pertinent negatives include no chest pressure, chest tightness, dizziness, leg swelling, muscle weakness, palpitations, shortness of breath or weight gain. Risk factors include hyperlipidemia. Risk factors do not include obesity. Her past medical history is significant for CHF. The symptoms have been stable.   Congestive Heart Failure  Presents for follow-up visit. Associated symptoms include chest pain. Pertinent negatives include no abdominal pain, chest pressure, claudication, edema, fatigue, muscle weakness, near-syncope, nocturia, orthopnea, palpitations, paroxysmal nocturnal dyspnea, shortness of breath or unexpected weight change. The symptoms have been stable. Her past medical history is significant for CAD.   Chest Pain   The current episode started more than 1 year ago. The problem has been resolved. Associated symptoms include back pain. Pertinent negatives include no abdominal pain, claudication, cough, diaphoresis, dizziness, exertional chest pressure, fever, headaches, hemoptysis, irregular heartbeat, leg pain, malaise/fatigue, nausea, near-syncope, numbness, orthopnea, palpitations, PND, shortness of breath, sputum production, syncope, vomiting or weakness.   Her past medical history is significant for CAD, CHF and hyperlipidemia.   Pertinent negatives for past medical history include no diabetes and no muscle weakness.   Hypertension  The  problem has been resolved since onset. The problem is uncontrolled (usually 130-150/70-80 mmHg at home). Associated symptoms include chest pain and neck pain. Pertinent negatives include no anxiety, blurred vision, headaches, malaise/fatigue, orthopnea, palpitations, peripheral edema, PND, shortness of breath or sweats. There is no history of chronic renal disease.   Hyperlipidemia  Recent lipid tests were reviewed and are high. She has no history of chronic renal disease, diabetes, hypothyroidism, liver disease, obesity or nephrotic syndrome. Associated symptoms include chest pain. Pertinent negatives include no focal sensory loss, focal weakness, leg pain, myalgias or shortness of breath.       Review of Systems   Constitution: Negative for chills, diaphoresis, fatigue, fever, malaise/fatigue, unexpected weight change and weight gain.   HENT: Negative for nosebleeds.    Eyes: Negative for blurred vision, double vision, vision loss in left eye and vision loss in right eye.   Cardiovascular: Positive for chest pain. Negative for claudication, dyspnea on exertion, irregular heartbeat, leg swelling, near-syncope, orthopnea, palpitations, paroxysmal nocturnal dyspnea and syncope.   Respiratory: Negative for chest tightness, cough, hemoptysis, shortness of breath, sputum production and wheezing.    Endocrine: Negative for cold intolerance and heat intolerance.   Hematologic/Lymphatic: Negative for bleeding problem. Does not bruise/bleed easily.   Skin: Negative for color change and rash.   Musculoskeletal: Positive for back pain and neck pain. Negative for falls, muscle weakness and myalgias.   Gastrointestinal: Negative for abdominal pain, heartburn, hematemesis, hematochezia, hemorrhoids, jaundice, melena, nausea and vomiting.   Genitourinary: Negative for dysuria, hematuria and nocturia.   Neurological: Negative for dizziness, focal weakness, headaches, light-headedness, loss of balance, numbness, vertigo and  "weakness.   Psychiatric/Behavioral: Negative for altered mental status, depression and memory loss. The patient is not nervous/anxious.    Allergic/Immunologic: Negative for hives and persistent infections.       Current Outpatient Medications on File Prior to Visit   Medication Sig Dispense Refill    losartan (COZAAR) 100 MG tablet Take 1 tablet (100 mg total) by mouth once daily. 90 tablet 3    metoprolol succinate (TOPROL-XL) 50 MG 24 hr tablet Take 1 tablet (50 mg total) by mouth once daily. 90 tablet 3    oxybutynin (DITROPAN-XL) 5 MG TR24 TAKE 1 TABLET BY MOUTH EVERY DAY 90 tablet 3    spironolactone (ALDACTONE) 25 MG tablet Take 1 tablet (25 mg total) by mouth once daily. 90 tablet 3    torsemide (DEMADEX) 10 MG Tab Take 1 tablet (10 mg total) by mouth once daily. 90 tablet 3    clorazepate (TRANXENE) 3.75 MG Tab Take 7.5 mg by mouth 3 (three) times daily.       No current facility-administered medications on file prior to visit.        BP (!) 151/67 (BP Location: Left arm, Patient Position: Sitting, BP Method: Large (Automatic))   Pulse 61   Ht 5' 4" (1.626 m)   Wt 80.7 kg (177 lb 14.6 oz)   BMI 30.54 kg/m²       Objective:     Physical Exam   Constitutional: She is oriented to person, place, and time. She appears well-developed and well-nourished.  Non-toxic appearance. No distress.   HENT:   Head: Normocephalic and atraumatic.   Nose: Nose normal.   Eyes: Right eye exhibits no discharge. Left eye exhibits no discharge. Right conjunctiva is not injected. Left conjunctiva is not injected. Right pupil is round. Left pupil is round. Pupils are equal.   Neck: Neck supple. No JVD present. Carotid bruit is not present. No thyromegaly present.   Cardiovascular: Normal rate, regular rhythm, S1 normal and S2 normal.  No extrasystoles are present. PMI is not displaced. Exam reveals no gallop.   Pulses:       Radial pulses are 2+ on the right side and 2+ on the left side.        Femoral pulses are 2+ on the " right side and 2+ on the left side.       Dorsalis pedis pulses are 2+ on the right side and 2+ on the left side.        Posterior tibial pulses are 2+ on the right side and 2+ on the left side.   Pulmonary/Chest: Effort normal and breath sounds normal.   Abdominal: Soft. Normal appearance. There is no hepatosplenomegaly. There is no abdominal tenderness.   Musculoskeletal:      Right ankle: She exhibits no swelling, no ecchymosis and no deformity.      Left ankle: She exhibits no swelling, no ecchymosis and no deformity.   Lymphadenopathy:        Head (right side): No submandibular adenopathy present.        Head (left side): No submandibular adenopathy present.     She has no cervical adenopathy.   Neurological: She is alert and oriented to person, place, and time. She is not disoriented. No cranial nerve deficit.   Skin: Skin is warm, dry and intact. No ecchymosis and no rash noted. She is not diaphoretic.   Psychiatric: She has a normal mood and affect. Her speech is normal and behavior is normal. Judgment and thought content normal. Cognition and memory are normal.         Assessment:     1. Coronary artery disease involving native coronary artery of native heart without angina pectoris    2. History of myocardial infarction    3. History of coronary artery stent placement    4. History of chest pain    5. Heart failure, diastolic, chronic    6. Aortic valve disease    7. Bilateral carotid artery stenosis    8. Essential hypertension    9. Hypercholesterolemia    10. Mild obesity        Plan:     1. Coronary Artery Disease              11/27/2017: NSTEMI. Troponin >50.              11/28/2017: [facility] [place]: Cath: LAD: 80% involving D1. D1 osteal 80%. LV: Normal systolic function. LAD: LM 3.0 x 18 mm. D1: PTCA 2.5 mm.              2019: Aspirin 81 mg Q24 was discontinued due to easy bruising of fingers. She showed me images. Doubted benefit with aspirin >risk. Resolved.              Doing well.     2. Heart Failure,  Diastolic, Chronic              6/14/2013: Echo: Normal LV size and function. Mild aortic valve sclerosis with mild AR.              7/7/2017: Echo: Normal left ventricular size and systolic function. Mild LVH. Moderate diastolic dysfunction. Moderately dilated LA. Mild AS - 1.7 m/s. SPAP 48 mmHg.              5/16/2018: Echo: Normal left ventricular size and systolic function. Mild LVH. Mild diastolic dysfunction. Moderately dilated LA. Mild aortic valve sclerosis - 1.9 m/s.   Leg edema appeared at least partially related to amlodipine.              On torsemide 10 mg Q24 and spironolactone 25 mg Q24.              Well compensated.                3. Aortic Valve Disease              6/14/2013: Echo: Mild aortic valve sclerosis with mild AR.              7/7/2017: Echo: Mild AS - 1.7 m/s.              5/16/2018: Echo: Mild aortic valve sclerosis - 1.9 m/s.              Stable.     4. History of Chest Pain              2017: Began experience chest discomfort.              8/9/2017: Stress Echo: 3:00 min. No CP. ECG negative. Echo negative.              Resolved.     5. Carotid Artery Disease              2/3/2011: Left CEA for 80% stenosis with small ulcer. Had mild WILLIAM disease.              3/20/2012: Carotid Duplex: Mild disease.              5/6/2014: Carotid Duplex: WILLIAM: moderate plaquing -1.2 m/s - 50-60%. LICA: moderate plaquing - tortuous - 1.6 m/s - 50-60%.              5/5/2015: Carotid Duplex: Moderate plaquing.               7/7/2017: Carotid Duplex: WILLIAM: Moderate plaquing - 1.3 m/s - 50-60%. LICA: Moderate plaquing - 1.8 m/s - 60-70%.              8/15/2018: Carotid Duplex: WILLIAM: Moderate plaquing - 1.5 m/s - 50-60%. LICA: Moderate plaquing - 1.3 m/s - 50-60%.   8/7/2019: Carotid Duplex: WILLIAM: Moderate plaquing - 1.4 m/s - 50-60%. LICA: Moderate plaquing - 1.5 m/s - 50-60%.   8/25/2020: Carotid Duplex: WILLIAM: Moderate plaquing - < 50%. LICA: Moderate plaquing - < 50%.              No aspirin due to easy  "bleeding.               8/2021: Plan next Carotid Duplex. Then yearly.     6. Hypertension              1955: Diagnosed.              Severe hypertension.              Could not tolerate carvedilol or amlodipine.              On metoprolol 50 mg Q24, losartan 100 mg Q24, spironolactone 25 mg Q24 and torsemide 10 mg Q24.              Keeping log at home.               Fairly well controlled overall.    Essentially "best possible".                7. Hypercholesterolemia              2013: Could not tolerate statin due to severe muscle pains.              Absolutely against trying any statin.     8. Mild Obesity              6/27/2017: Weight 82 kg. BMI 31.              Encouraged to lose weight.     9. Primary Care              Dr. Magi Amezcua.     F/u 4 months.     Ken Wilson M.D.        Copy:    Dr. Magi Amezcua.    "

## 2021-01-25 ENCOUNTER — TELEPHONE (OUTPATIENT)
Dept: CARDIOLOGY | Facility: CLINIC | Age: 86
End: 2021-01-25

## 2021-03-30 ENCOUNTER — CLINICAL SUPPORT (OUTPATIENT)
Dept: REHABILITATION | Facility: HOSPITAL | Age: 86
End: 2021-03-30
Payer: MEDICARE

## 2021-03-30 DIAGNOSIS — R29.898 DECREASED STRENGTH OF TRUNK AND BACK: ICD-10-CM

## 2021-03-30 DIAGNOSIS — R29.3 POSTURE IMBALANCE: ICD-10-CM

## 2021-03-30 PROCEDURE — 97110 THERAPEUTIC EXERCISES: CPT | Mod: PN

## 2021-03-30 PROCEDURE — 97161 PT EVAL LOW COMPLEX 20 MIN: CPT | Mod: PN

## 2021-04-01 ENCOUNTER — CLINICAL SUPPORT (OUTPATIENT)
Dept: REHABILITATION | Facility: HOSPITAL | Age: 86
End: 2021-04-01
Payer: MEDICARE

## 2021-04-01 DIAGNOSIS — R29.3 POSTURE IMBALANCE: ICD-10-CM

## 2021-04-01 DIAGNOSIS — R29.898 DECREASED STRENGTH OF TRUNK AND BACK: ICD-10-CM

## 2021-04-01 PROCEDURE — 97110 THERAPEUTIC EXERCISES: CPT | Mod: PN,CQ

## 2021-04-08 ENCOUNTER — OFFICE VISIT (OUTPATIENT)
Dept: CARDIOLOGY | Facility: CLINIC | Age: 86
End: 2021-04-08
Attending: INTERNAL MEDICINE
Payer: MEDICARE

## 2021-04-08 VITALS
BODY MASS INDEX: 30.38 KG/M2 | WEIGHT: 177.94 LBS | SYSTOLIC BLOOD PRESSURE: 125 MMHG | HEIGHT: 64 IN | DIASTOLIC BLOOD PRESSURE: 75 MMHG | HEART RATE: 62 BPM

## 2021-04-08 DIAGNOSIS — Z95.5 HISTORY OF CORONARY ARTERY STENT PLACEMENT: ICD-10-CM

## 2021-04-08 DIAGNOSIS — I25.10 CORONARY ARTERY DISEASE INVOLVING NATIVE CORONARY ARTERY OF NATIVE HEART WITHOUT ANGINA PECTORIS: ICD-10-CM

## 2021-04-08 DIAGNOSIS — E66.9 MILD OBESITY: ICD-10-CM

## 2021-04-08 DIAGNOSIS — I35.9 AORTIC VALVE DISEASE: ICD-10-CM

## 2021-04-08 DIAGNOSIS — I65.23 BILATERAL CAROTID ARTERY STENOSIS: ICD-10-CM

## 2021-04-08 DIAGNOSIS — I10 ESSENTIAL HYPERTENSION: ICD-10-CM

## 2021-04-08 DIAGNOSIS — Z87.898 HISTORY OF CHEST PAIN: ICD-10-CM

## 2021-04-08 DIAGNOSIS — I50.32 HEART FAILURE, DIASTOLIC, CHRONIC: ICD-10-CM

## 2021-04-08 DIAGNOSIS — E78.00 HYPERCHOLESTEROLEMIA: ICD-10-CM

## 2021-04-08 DIAGNOSIS — I25.2 HISTORY OF MYOCARDIAL INFARCTION: ICD-10-CM

## 2021-04-08 PROCEDURE — 99999 PR PBB SHADOW E&M-EST. PATIENT-LVL III: ICD-10-PCS | Mod: PBBFAC,,, | Performed by: INTERNAL MEDICINE

## 2021-04-08 PROCEDURE — 99999 PR PBB SHADOW E&M-EST. PATIENT-LVL III: CPT | Mod: PBBFAC,,, | Performed by: INTERNAL MEDICINE

## 2021-04-08 PROCEDURE — 99213 OFFICE O/P EST LOW 20 MIN: CPT | Mod: PBBFAC | Performed by: INTERNAL MEDICINE

## 2021-04-08 PROCEDURE — 99214 OFFICE O/P EST MOD 30 MIN: CPT | Mod: S$PBB,,, | Performed by: INTERNAL MEDICINE

## 2021-04-08 PROCEDURE — 99214 PR OFFICE/OUTPT VISIT, EST, LEVL IV, 30-39 MIN: ICD-10-PCS | Mod: S$PBB,,, | Performed by: INTERNAL MEDICINE

## 2021-04-08 RX ORDER — TORSEMIDE 10 MG/1
10 TABLET ORAL DAILY
Qty: 90 TABLET | Refills: 3 | Status: SHIPPED | OUTPATIENT
Start: 2021-04-08 | End: 2021-08-10 | Stop reason: SDUPTHER

## 2021-04-08 RX ORDER — METOPROLOL SUCCINATE 50 MG/1
50 TABLET, EXTENDED RELEASE ORAL DAILY
Qty: 90 TABLET | Refills: 3 | Status: SHIPPED | OUTPATIENT
Start: 2021-04-08 | End: 2021-08-10 | Stop reason: SDUPTHER

## 2021-04-08 RX ORDER — LOSARTAN POTASSIUM 100 MG/1
100 TABLET ORAL DAILY
Qty: 90 TABLET | Refills: 3 | Status: SHIPPED | OUTPATIENT
Start: 2021-04-08 | End: 2021-08-10 | Stop reason: SDUPTHER

## 2021-04-08 RX ORDER — SPIRONOLACTONE 25 MG/1
25 TABLET ORAL DAILY
Qty: 90 TABLET | Refills: 3 | Status: SHIPPED | OUTPATIENT
Start: 2021-04-08 | End: 2021-08-10 | Stop reason: SDUPTHER

## 2021-04-16 ENCOUNTER — PATIENT MESSAGE (OUTPATIENT)
Dept: RESEARCH | Facility: HOSPITAL | Age: 86
End: 2021-04-16

## 2021-04-20 ENCOUNTER — CLINICAL SUPPORT (OUTPATIENT)
Dept: REHABILITATION | Facility: HOSPITAL | Age: 86
End: 2021-04-20
Payer: MEDICARE

## 2021-04-20 DIAGNOSIS — R29.898 DECREASED STRENGTH OF TRUNK AND BACK: ICD-10-CM

## 2021-04-20 DIAGNOSIS — R29.3 POSTURE IMBALANCE: ICD-10-CM

## 2021-04-20 PROCEDURE — 97110 THERAPEUTIC EXERCISES: CPT | Mod: PN

## 2021-04-27 ENCOUNTER — CLINICAL SUPPORT (OUTPATIENT)
Dept: REHABILITATION | Facility: HOSPITAL | Age: 86
End: 2021-04-27
Payer: MEDICARE

## 2021-04-27 DIAGNOSIS — R29.3 POSTURE IMBALANCE: ICD-10-CM

## 2021-04-27 DIAGNOSIS — R29.898 DECREASED STRENGTH OF TRUNK AND BACK: ICD-10-CM

## 2021-04-27 PROCEDURE — 97110 THERAPEUTIC EXERCISES: CPT | Mod: PN

## 2021-08-10 ENCOUNTER — OFFICE VISIT (OUTPATIENT)
Dept: CARDIOLOGY | Facility: CLINIC | Age: 86
End: 2021-08-10
Attending: INTERNAL MEDICINE
Payer: MEDICARE

## 2021-08-10 VITALS
DIASTOLIC BLOOD PRESSURE: 70 MMHG | HEART RATE: 56 BPM | HEIGHT: 64 IN | WEIGHT: 180.69 LBS | SYSTOLIC BLOOD PRESSURE: 135 MMHG | BODY MASS INDEX: 30.85 KG/M2

## 2021-08-10 DIAGNOSIS — Z95.5 HISTORY OF CORONARY ARTERY STENT PLACEMENT: ICD-10-CM

## 2021-08-10 DIAGNOSIS — Z87.898 HISTORY OF CHEST PAIN: ICD-10-CM

## 2021-08-10 DIAGNOSIS — I10 ESSENTIAL HYPERTENSION: ICD-10-CM

## 2021-08-10 DIAGNOSIS — I50.32 HEART FAILURE, DIASTOLIC, CHRONIC: ICD-10-CM

## 2021-08-10 DIAGNOSIS — E78.00 HYPERCHOLESTEROLEMIA: ICD-10-CM

## 2021-08-10 DIAGNOSIS — I25.2 HISTORY OF MYOCARDIAL INFARCTION: ICD-10-CM

## 2021-08-10 DIAGNOSIS — E66.9 MILD OBESITY: ICD-10-CM

## 2021-08-10 DIAGNOSIS — I35.9 AORTIC VALVE DISEASE: ICD-10-CM

## 2021-08-10 DIAGNOSIS — I65.23 BILATERAL CAROTID ARTERY STENOSIS: ICD-10-CM

## 2021-08-10 DIAGNOSIS — I25.10 CORONARY ARTERY DISEASE INVOLVING NATIVE CORONARY ARTERY OF NATIVE HEART WITHOUT ANGINA PECTORIS: ICD-10-CM

## 2021-08-10 PROCEDURE — 99999 PR PBB SHADOW E&M-EST. PATIENT-LVL III: CPT | Mod: PBBFAC,,, | Performed by: INTERNAL MEDICINE

## 2021-08-10 PROCEDURE — 99214 PR OFFICE/OUTPT VISIT, EST, LEVL IV, 30-39 MIN: ICD-10-PCS | Mod: S$PBB,,, | Performed by: INTERNAL MEDICINE

## 2021-08-10 PROCEDURE — 99999 PR PBB SHADOW E&M-EST. PATIENT-LVL III: ICD-10-PCS | Mod: PBBFAC,,, | Performed by: INTERNAL MEDICINE

## 2021-08-10 PROCEDURE — 99213 OFFICE O/P EST LOW 20 MIN: CPT | Mod: PBBFAC | Performed by: INTERNAL MEDICINE

## 2021-08-10 PROCEDURE — 99214 OFFICE O/P EST MOD 30 MIN: CPT | Mod: S$PBB,,, | Performed by: INTERNAL MEDICINE

## 2021-08-10 RX ORDER — TORSEMIDE 10 MG/1
10 TABLET ORAL DAILY
Qty: 90 TABLET | Refills: 3 | Status: SHIPPED | OUTPATIENT
Start: 2021-08-10 | End: 2021-08-11

## 2021-08-10 RX ORDER — MELOXICAM 15 MG/1
15 TABLET ORAL DAILY
COMMUNITY
Start: 2021-06-10

## 2021-08-10 RX ORDER — LOSARTAN POTASSIUM 100 MG/1
100 TABLET ORAL DAILY
Qty: 90 TABLET | Refills: 3 | Status: SHIPPED | OUTPATIENT
Start: 2021-08-10 | End: 2021-08-11

## 2021-08-10 RX ORDER — SERTRALINE HYDROCHLORIDE 50 MG/1
50 TABLET, FILM COATED ORAL DAILY
COMMUNITY
Start: 2021-06-14

## 2021-08-10 RX ORDER — SPIRONOLACTONE 25 MG/1
25 TABLET ORAL DAILY
Qty: 90 TABLET | Refills: 3 | Status: SHIPPED | OUTPATIENT
Start: 2021-08-10 | End: 2021-08-11

## 2021-08-10 RX ORDER — METOPROLOL SUCCINATE 50 MG/1
50 TABLET, EXTENDED RELEASE ORAL DAILY
Qty: 90 TABLET | Refills: 3 | Status: SHIPPED | OUTPATIENT
Start: 2021-08-10 | End: 2021-08-11

## 2021-08-13 ENCOUNTER — LAB VISIT (OUTPATIENT)
Dept: PRIMARY CARE CLINIC | Facility: CLINIC | Age: 86
End: 2021-08-13
Payer: MEDICARE

## 2021-08-13 DIAGNOSIS — Z20.822 ENCOUNTER FOR LABORATORY TESTING FOR COVID-19 VIRUS: ICD-10-CM

## 2021-08-13 PROCEDURE — U0003 INFECTIOUS AGENT DETECTION BY NUCLEIC ACID (DNA OR RNA); SEVERE ACUTE RESPIRATORY SYNDROME CORONAVIRUS 2 (SARS-COV-2) (CORONAVIRUS DISEASE [COVID-19]), AMPLIFIED PROBE TECHNIQUE, MAKING USE OF HIGH THROUGHPUT TECHNOLOGIES AS DESCRIBED BY CMS-2020-01-R: HCPCS | Performed by: INTERNAL MEDICINE

## 2021-08-13 PROCEDURE — U0005 INFEC AGEN DETEC AMPLI PROBE: HCPCS | Performed by: INTERNAL MEDICINE

## 2021-08-15 LAB
SARS-COV-2 RNA RESP QL NAA+PROBE: NOT DETECTED
SARS-COV-2- CYCLE NUMBER: -1

## 2021-08-16 ENCOUNTER — PATIENT MESSAGE (OUTPATIENT)
Dept: CARDIOLOGY | Facility: CLINIC | Age: 86
End: 2021-08-16

## 2021-08-16 DIAGNOSIS — I10 ESSENTIAL HYPERTENSION: ICD-10-CM

## 2021-08-16 RX ORDER — LOSARTAN POTASSIUM 100 MG/1
100 TABLET ORAL DAILY
Qty: 30 TABLET | Refills: 0 | Status: SHIPPED | OUTPATIENT
Start: 2021-08-16 | End: 2021-12-14 | Stop reason: SDUPTHER

## 2021-12-14 ENCOUNTER — OFFICE VISIT (OUTPATIENT)
Dept: CARDIOLOGY | Facility: CLINIC | Age: 86
End: 2021-12-14
Attending: INTERNAL MEDICINE
Payer: MEDICARE

## 2021-12-14 VITALS
BODY MASS INDEX: 30.11 KG/M2 | HEART RATE: 51 BPM | SYSTOLIC BLOOD PRESSURE: 150 MMHG | HEIGHT: 64 IN | WEIGHT: 176.38 LBS | DIASTOLIC BLOOD PRESSURE: 70 MMHG

## 2021-12-14 DIAGNOSIS — I25.2 HISTORY OF MYOCARDIAL INFARCTION: ICD-10-CM

## 2021-12-14 DIAGNOSIS — Z95.5 HISTORY OF CORONARY ARTERY STENT PLACEMENT: ICD-10-CM

## 2021-12-14 DIAGNOSIS — I10 ESSENTIAL HYPERTENSION: ICD-10-CM

## 2021-12-14 DIAGNOSIS — I25.10 CORONARY ARTERY DISEASE INVOLVING NATIVE CORONARY ARTERY OF NATIVE HEART WITHOUT ANGINA PECTORIS: ICD-10-CM

## 2021-12-14 DIAGNOSIS — I10 PRIMARY HYPERTENSION: ICD-10-CM

## 2021-12-14 DIAGNOSIS — I50.32 HEART FAILURE, DIASTOLIC, CHRONIC: ICD-10-CM

## 2021-12-14 DIAGNOSIS — I65.23 BILATERAL CAROTID ARTERY STENOSIS: ICD-10-CM

## 2021-12-14 DIAGNOSIS — Z87.898 HISTORY OF CHEST PAIN: ICD-10-CM

## 2021-12-14 DIAGNOSIS — E78.00 HYPERCHOLESTEROLEMIA: ICD-10-CM

## 2021-12-14 DIAGNOSIS — I35.9 AORTIC VALVE DISEASE: ICD-10-CM

## 2021-12-14 PROCEDURE — 99214 PR OFFICE/OUTPT VISIT, EST, LEVL IV, 30-39 MIN: ICD-10-PCS | Mod: 25,S$PBB,, | Performed by: INTERNAL MEDICINE

## 2021-12-14 PROCEDURE — 99999 PR PBB SHADOW E&M-EST. PATIENT-LVL III: ICD-10-PCS | Mod: PBBFAC,,, | Performed by: INTERNAL MEDICINE

## 2021-12-14 PROCEDURE — 93010 EKG 12-LEAD: ICD-10-PCS | Mod: ,,, | Performed by: INTERNAL MEDICINE

## 2021-12-14 PROCEDURE — 99214 OFFICE O/P EST MOD 30 MIN: CPT | Mod: 25,S$PBB,, | Performed by: INTERNAL MEDICINE

## 2021-12-14 PROCEDURE — 99213 OFFICE O/P EST LOW 20 MIN: CPT | Mod: PBBFAC | Performed by: INTERNAL MEDICINE

## 2021-12-14 PROCEDURE — 93005 ELECTROCARDIOGRAM TRACING: CPT | Mod: PBBFAC | Performed by: INTERNAL MEDICINE

## 2021-12-14 PROCEDURE — 93005 ELECTROCARDIOGRAM TRACING: CPT

## 2021-12-14 PROCEDURE — 93010 ELECTROCARDIOGRAM REPORT: CPT | Mod: ,,, | Performed by: INTERNAL MEDICINE

## 2021-12-14 PROCEDURE — 99999 PR PBB SHADOW E&M-EST. PATIENT-LVL III: CPT | Mod: PBBFAC,,, | Performed by: INTERNAL MEDICINE

## 2021-12-14 RX ORDER — METOPROLOL SUCCINATE 50 MG/1
50 TABLET, EXTENDED RELEASE ORAL DAILY
Qty: 90 TABLET | Refills: 3 | Status: SHIPPED | OUTPATIENT
Start: 2021-12-14

## 2021-12-14 RX ORDER — LOSARTAN POTASSIUM 100 MG/1
100 TABLET ORAL DAILY
Qty: 90 TABLET | Refills: 3 | Status: SHIPPED | OUTPATIENT
Start: 2021-12-14 | End: 2022-11-02

## 2021-12-14 RX ORDER — SPIRONOLACTONE 25 MG/1
25 TABLET ORAL DAILY
Qty: 90 TABLET | Refills: 3 | Status: SHIPPED | OUTPATIENT
Start: 2021-12-14

## 2021-12-14 RX ORDER — TORSEMIDE 10 MG/1
10 TABLET ORAL DAILY
Qty: 90 TABLET | Refills: 3 | Status: SHIPPED | OUTPATIENT
Start: 2021-12-14

## 2022-03-28 ENCOUNTER — TELEPHONE (OUTPATIENT)
Dept: CARDIOLOGY | Facility: CLINIC | Age: 87
End: 2022-03-28
Payer: MEDICARE

## 2022-03-28 NOTE — TELEPHONE ENCOUNTER
Returned call to patient. Will put her on the wait list.      ----- Message from Norm Lemos sent at 3/28/2022  7:55 AM CDT -----  Contact: Patient  The pt called and has an appt schedule for 4/19/22 but needs to be seen sooner because of her b/p    The pt can be reached at [phone]

## 2022-04-19 ENCOUNTER — OFFICE VISIT (OUTPATIENT)
Dept: CARDIOLOGY | Facility: CLINIC | Age: 87
End: 2022-04-19
Attending: INTERNAL MEDICINE
Payer: MEDICARE

## 2022-04-19 VITALS
BODY MASS INDEX: 30.48 KG/M2 | OXYGEN SATURATION: 99 % | WEIGHT: 178.56 LBS | SYSTOLIC BLOOD PRESSURE: 126 MMHG | HEIGHT: 64 IN | DIASTOLIC BLOOD PRESSURE: 64 MMHG | HEART RATE: 49 BPM

## 2022-04-19 DIAGNOSIS — E78.00 HYPERCHOLESTEROLEMIA: ICD-10-CM

## 2022-04-19 DIAGNOSIS — I35.9 AORTIC VALVE DISEASE: ICD-10-CM

## 2022-04-19 DIAGNOSIS — I25.10 CORONARY ARTERY DISEASE INVOLVING NATIVE CORONARY ARTERY OF NATIVE HEART WITHOUT ANGINA PECTORIS: ICD-10-CM

## 2022-04-19 DIAGNOSIS — I65.23 BILATERAL CAROTID ARTERY STENOSIS: ICD-10-CM

## 2022-04-19 DIAGNOSIS — I50.32 HEART FAILURE, DIASTOLIC, CHRONIC: ICD-10-CM

## 2022-04-19 DIAGNOSIS — Z87.898 HISTORY OF CHEST PAIN: ICD-10-CM

## 2022-04-19 DIAGNOSIS — Z95.5 HISTORY OF CORONARY ARTERY STENT PLACEMENT: ICD-10-CM

## 2022-04-19 DIAGNOSIS — I10 PRIMARY HYPERTENSION: ICD-10-CM

## 2022-04-19 DIAGNOSIS — E66.9 MILD OBESITY: ICD-10-CM

## 2022-04-19 DIAGNOSIS — I25.2 HISTORY OF MYOCARDIAL INFARCTION: ICD-10-CM

## 2022-04-19 PROCEDURE — 99213 OFFICE O/P EST LOW 20 MIN: CPT | Mod: PBBFAC | Performed by: INTERNAL MEDICINE

## 2022-04-19 PROCEDURE — 99214 OFFICE O/P EST MOD 30 MIN: CPT | Mod: S$PBB,,, | Performed by: INTERNAL MEDICINE

## 2022-04-19 PROCEDURE — 99999 PR PBB SHADOW E&M-EST. PATIENT-LVL III: CPT | Mod: PBBFAC,,, | Performed by: INTERNAL MEDICINE

## 2022-04-19 PROCEDURE — 99999 PR PBB SHADOW E&M-EST. PATIENT-LVL III: ICD-10-PCS | Mod: PBBFAC,,, | Performed by: INTERNAL MEDICINE

## 2022-04-19 PROCEDURE — 99214 PR OFFICE/OUTPT VISIT, EST, LEVL IV, 30-39 MIN: ICD-10-PCS | Mod: S$PBB,,, | Performed by: INTERNAL MEDICINE

## 2022-04-19 RX ORDER — CLORAZEPATE DIPOTASSIUM 3.75 MG/1
3.75 TABLET ORAL
COMMUNITY

## 2022-04-19 NOTE — PROGRESS NOTES
Subjective:     eDbo Ceballos is a 89 y.o. female with long history of difficult to control hypertension and hypercholesterolemia. She is mildly obese. She has severe carotid artery disease and underwent left carotid endarterectomy in 2011. She cannot tolerate statins as that causes severe muscle pains. She could not tolerate carvedilol either as she felt that caused her to get an unsteady gait and upset stomach. She could not tolerate amlodipine as that caused leg edema. She had an MRI for questionable slurred speech in 4/2013 which revealed generalized atrophy. Beginning in 7/2017 after her  passed she experienced occasional mild chest pressure. On 8/9/2017 she had a stress echocardiogram and was able to do 3:00 minutes without any chest pain and the electrocardiogram was negative as was the echocardiogram. The chest discomfort resolved. On 11/27/2017 after visiting her podiatrist she walked out to her car. She fairly suddenly felt a mild chest pressure with a discomfort in the back of her neck. She denied any dyspnea. She felt weak and thought she may pass out. She called for help and was brought to the emergency room. She had no further chest discomfort. The electrocardiogram did not reveal any clear acute changes. Troponin however was elevated to 2.0. She was admitted. On 11/28/2017 she underwent coronary angiography that revealed a severe bifurcation lesion at the takeoff of the first diagonal  branch from the left anterior descending coronary artery. The left anterior descending coronary artery was stented and the first diagonal  branch dilated using a two wire technique. She felt well following the intervention. The troponin vanna to >50 following the intervention which was not expected and felt to possibly be related to reperfusion issues. There were no wall motion abnormality seen. She had some ecchymosis in the right groin. Due to a high blood pressure spironolactone was addded to her  antihypertensive regimen. She did not want to try a statin. The importance of dual antiplatelet therapy was stressed. On 5/16/2018 she had an echocardiogram that revealed normal left ventricular size and systolic function with mild left ventricular hypertrophy. There was mild diastolic dysfunction and the left atrium was moderately dilated. In addition there was mild aortic valve sclerosis with a peak velocity of 1.9 m/s over the aortic valve. In 2019 she began noticing occasional bruising of the left thumb that resolved as she discontinued aspirin. On 12/10/2020 she was worried about side effects from several of her medications. No exertional chest discomfort but mild exertional dyspnea. No palpitations or weak spells. No bleeding. No issues with any of her prescribed medications. Feeling well overall.       Coronary Artery Disease  Presents for follow-up visit. Pertinent negatives include no chest pain, chest pressure, chest tightness, dizziness, leg swelling, muscle weakness, palpitations, shortness of breath or weight gain. Risk factors include hyperlipidemia. Risk factors do not include obesity. Her past medical history is significant for CHF. The symptoms have been stable.   Congestive Heart Failure  Presents for follow-up visit. Pertinent negatives include no abdominal pain, chest pain, chest pressure, claudication, edema, fatigue, muscle weakness, near-syncope, nocturia, orthopnea, palpitations, paroxysmal nocturnal dyspnea, shortness of breath or unexpected weight change. The symptoms have been stable. Her past medical history is significant for CAD.   Chest Pain   The current episode started more than 1 year ago. The problem has been resolved. Associated symptoms include back pain. Pertinent negatives include no abdominal pain, claudication, cough, diaphoresis, dizziness, exertional chest pressure, fever, headaches, hemoptysis, irregular heartbeat, leg pain, lower extremity edema, malaise/fatigue, nausea,  near-syncope, numbness, orthopnea, palpitations, PND, shortness of breath, sputum production, syncope, vomiting or weakness.   Her past medical history is significant for CAD, CHF and hyperlipidemia.   Pertinent negatives for past medical history include no diabetes and no muscle weakness.   Hypertension  The problem has been resolved since onset. The problem is uncontrolled (usually 140-160/70-80 mmHg at home). Associated symptoms include neck pain. Pertinent negatives include no anxiety, blurred vision, chest pain, headaches, malaise/fatigue, orthopnea, palpitations, peripheral edema, PND, shortness of breath or sweats. There is no history of chronic renal disease.   Hyperlipidemia  Recent lipid tests were reviewed and are high. She has no history of chronic renal disease, diabetes, hypothyroidism, liver disease, obesity or nephrotic syndrome. Pertinent negatives include no chest pain, focal sensory loss, focal weakness, leg pain, myalgias or shortness of breath.       Review of Systems   Constitutional: Negative for chills, diaphoresis, fatigue, fever, malaise/fatigue, unexpected weight change and weight gain.   HENT: Negative for nosebleeds.    Eyes: Negative for blurred vision, double vision, vision loss in left eye and vision loss in right eye.   Cardiovascular: Positive for dyspnea on exertion. Negative for chest pain, claudication, irregular heartbeat, leg swelling, near-syncope, orthopnea, palpitations, paroxysmal nocturnal dyspnea and syncope.   Respiratory: Negative for chest tightness, cough, hemoptysis, shortness of breath, sputum production and wheezing.    Endocrine: Negative for cold intolerance and heat intolerance.   Hematologic/Lymphatic: Negative for bleeding problem. Does not bruise/bleed easily.   Skin: Negative for color change and rash.   Musculoskeletal: Positive for back pain and neck pain. Negative for falls, muscle weakness and myalgias.   Gastrointestinal: Negative for abdominal pain,  "heartburn, hematemesis, hematochezia, hemorrhoids, jaundice, melena, nausea and vomiting.   Genitourinary: Negative for dysuria, hematuria and nocturia.   Neurological: Positive for light-headedness. Negative for dizziness, focal weakness, headaches, loss of balance, numbness, vertigo and weakness.   Psychiatric/Behavioral: Negative for altered mental status, depression and memory loss. The patient is not nervous/anxious.    Allergic/Immunologic: Negative for hives and persistent infections.       Current Outpatient Medications on File Prior to Visit   Medication Sig Dispense Refill    clorazepate (TRANXENE) 3.75 MG Tab Take 3.75 mg by mouth as needed.      losartan (COZAAR) 100 MG tablet Take 1 tablet (100 mg total) by mouth once daily. 90 tablet 3    meloxicam (MOBIC) 15 MG tablet Take 15 mg by mouth once daily.      metoprolol succinate (TOPROL-XL) 50 MG 24 hr tablet Take 1 tablet (50 mg total) by mouth once daily. 90 tablet 3    oxybutynin (DITROPAN-XL) 5 MG TR24 Take 1 tablet (5 mg total) by mouth once daily. 90 tablet 3    sertraline (ZOLOFT) 50 MG tablet Take 50 mg by mouth once daily.      spironolactone (ALDACTONE) 25 MG tablet Take 1 tablet (25 mg total) by mouth once daily. 90 tablet 3    torsemide (DEMADEX) 10 MG Tab Take 1 tablet (10 mg total) by mouth once daily. 90 tablet 3     No current facility-administered medications on file prior to visit.       /64 (BP Location: Right arm, Patient Position: Sitting)   Pulse (!) 49   Ht 5' 4" (1.626 m)   Wt 81 kg (178 lb 9.2 oz)   SpO2 99%   BMI 30.65 kg/m²       Objective:     Physical Exam  Constitutional:       General: She is not in acute distress.     Appearance: Normal appearance. She is well-developed. She is not toxic-appearing or diaphoretic.   HENT:      Head: Normocephalic and atraumatic.      Nose: Nose normal.   Eyes:      General:         Right eye: No discharge.         Left eye: No discharge.      Conjunctiva/sclera:      Right " eye: Right conjunctiva is not injected.      Left eye: Left conjunctiva is not injected.      Pupils: Pupils are equal.      Right eye: Pupil is round.      Left eye: Pupil is round.   Neck:      Thyroid: No thyromegaly.      Vascular: No carotid bruit or JVD.   Cardiovascular:      Rate and Rhythm: Normal rate and regular rhythm.  No extrasystoles are present.     Chest Wall: PMI is not displaced.      Pulses:           Radial pulses are 2+ on the right side and 2+ on the left side.        Femoral pulses are 2+ on the right side and 2+ on the left side.       Dorsalis pedis pulses are 2+ on the right side and 2+ on the left side.        Posterior tibial pulses are 2+ on the right side and 2+ on the left side.      Heart sounds: S1 normal and S2 normal.     No gallop.   Pulmonary:      Effort: Pulmonary effort is normal.      Breath sounds: Normal breath sounds.   Abdominal:      Palpations: Abdomen is soft.      Tenderness: There is no abdominal tenderness.   Musculoskeletal:      Cervical back: Neck supple.      Right ankle: No swelling, deformity or ecchymosis.      Left ankle: No swelling, deformity or ecchymosis.   Lymphadenopathy:      Head:      Right side of head: No submandibular adenopathy.      Left side of head: No submandibular adenopathy.      Cervical: No cervical adenopathy.   Skin:     General: Skin is warm and dry.      Findings: No ecchymosis or rash.   Neurological:      General: No focal deficit present.      Mental Status: She is alert and oriented to person, place, and time. She is not disoriented.      Cranial Nerves: No cranial nerve deficit.   Psychiatric:         Attention and Perception: Attention and perception normal.         Mood and Affect: Mood and affect normal.         Speech: Speech normal.         Behavior: Behavior normal.         Thought Content: Thought content normal.         Cognition and Memory: Cognition and memory normal.         Judgment: Judgment normal.            Assessment:     1. Coronary artery disease involving native coronary artery of native heart without angina pectoris    2. History of myocardial infarction    3. History of coronary artery stent placement    4. History of chest pain    5. Heart failure, diastolic, chronic    6. Aortic valve disease    7. Bilateral carotid artery stenosis    8. Primary hypertension    9. Hypercholesterolemia    10. Mild obesity        Plan:     1. Coronary Artery Disease              11/27/2017: NSTEMI. Troponin >50.              11/28/2017: [facility]: Cath: LAD: 80% involving D1. D1 osteal 80%. LV: Normal systolic function. LAD: LM 3.0 x 18 mm. D1: PTCA 2.5 mm.              2019: Aspirin 81 mg Q24 was discontinued due to easy bruising of fingers. She showed me images. Doubted benefit with aspirin > risk. The easy bruising resolved.   Asymptomatic.              Doing well.     2. Heart Failure, Diastolic, Chronic              6/14/2013: Echo: Normal LV size and function. Mild aortic valve sclerosis with mild AR.              7/7/2017: Echo: Normal left ventricular size and systolic function. Mild LVH. Moderate diastolic dysfunction. Moderately dilated LA. Mild AS - 1.7 m/s. SPAP 48 mmHg.              5/16/2018: Echo: Normal left ventricular size and systolic function. Mild LVH. Mild diastolic dysfunction. Moderately dilated LA. Mild aortic valve sclerosis - 1.9 m/s.   Leg edema appeared at least partially related to amlodipine.              On spironolactone 25 mg Q24 and torsemide 10 mg Q24.               Well compensated.                3. Aortic Valve Disease              6/14/2013: Echo: Mild aortic valve sclerosis with mild AR.              7/7/2017: Echo: Mild AS - 1.7 m/s.              5/16/2018: Echo: Mild aortic valve sclerosis - 1.9 m/s.              Stable.     4. History of Chest Pain              2017: Began experience chest discomfort.              8/9/2017: Stress Echo: 3:00 min. No CP. ECG negative. Echo negative.               Resolved.     5. Carotid Artery Disease              2/3/2011: Left CEA for 80% stenosis with small ulcer. Had mild WILLIAM disease.              3/20/2012: Carotid Duplex: Mild disease.              5/6/2014: Carotid Duplex: WILLIAM: moderate plaquing -1.2 m/s - 50-60%. LICA: moderate plaquing - tortuous - 1.6 m/s - 50-60%.              5/5/2015: Carotid Duplex: Moderate plaquing.               7/7/2017: Carotid Duplex: WILLIAM: Moderate plaquing - 1.3 m/s - 50-60%. LICA: Moderate plaquing - 1.8 m/s - 60-70%.              8/15/2018: Carotid Duplex: WILLIAM: Moderate plaquing - 1.5 m/s - 50-60%. LICA: Moderate plaquing - 1.3 m/s - 50-60%.   8/7/2019: Carotid Duplex: WILLIAM: Moderate plaquing - 1.4 m/s - 50-60%. LICA: Moderate plaquing - 1.5 m/s - 50-60%.   8/25/2020: Carotid Duplex: WILLIAM: Moderate plaquing - < 50%. LICA: Moderate plaquing - < 50%.              No aspirin due to easy bleeding.               8/2021: Decided not to follow with Carotid Duplex as near 89 years old.     6. Hypertension              1955: Diagnosed.              Severe hypertension.              Could not tolerate carvedilol or amlodipine.              On metoprolol 50 mg Q24, losartan 100 mg Q24, spironolactone 25 mg Q24 and torsemide 10 mg Q24.              Keeping log at home.               Overall reasonably well controlled.             7. Hypercholesterolemia              2013: Could not tolerate statin due to severe muscle pains.              Absolutely against trying any statin.     8. Mild Obesity              6/27/2017: Weight 82 kg. BMI 31.              Encouraged to lose weight.     9. Primary Care              Dr. Magi Amezcua.     F/u 4 months.     Ken Wilson M.D.

## 2022-10-19 DIAGNOSIS — G81.94 LEFT HEMIPLEGIA: Primary | ICD-10-CM

## 2022-10-19 DIAGNOSIS — I69.311: ICD-10-CM

## 2022-10-19 DIAGNOSIS — G81.94 LEFT HEMIPLEGIA: ICD-10-CM

## 2022-10-19 DIAGNOSIS — I69.311: Primary | ICD-10-CM

## 2022-10-19 DIAGNOSIS — I63.511 ACUTE RIGHT MCA STROKE: ICD-10-CM

## 2022-10-24 ENCOUNTER — CLINICAL SUPPORT (OUTPATIENT)
Dept: REHABILITATION | Facility: HOSPITAL | Age: 87
End: 2022-10-24
Attending: FAMILY MEDICINE
Payer: MEDICARE

## 2022-10-24 DIAGNOSIS — I69.311: ICD-10-CM

## 2022-10-24 DIAGNOSIS — R68.89 DECREASED STRENGTH, ENDURANCE, AND MOBILITY: ICD-10-CM

## 2022-10-24 DIAGNOSIS — R26.89 BALANCE PROBLEMS: ICD-10-CM

## 2022-10-24 DIAGNOSIS — I63.511 ACUTE RIGHT MCA STROKE: ICD-10-CM

## 2022-10-24 DIAGNOSIS — Z74.09 DECREASED STRENGTH, ENDURANCE, AND MOBILITY: ICD-10-CM

## 2022-10-24 DIAGNOSIS — R53.1 DECREASED STRENGTH, ENDURANCE, AND MOBILITY: ICD-10-CM

## 2022-10-24 DIAGNOSIS — G81.94 LEFT HEMIPLEGIA: ICD-10-CM

## 2022-10-24 PROCEDURE — 97161 PT EVAL LOW COMPLEX 20 MIN: CPT | Mod: PN

## 2022-10-24 NOTE — PROGRESS NOTES
[facility]  Physical Therapy Neurological Rehabilitation Initial Evaluation    Name: Debo Ceballos  Clinic Number: [id number]    Therapy Diagnosis:   Encounter Diagnoses   Name Primary?    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     Acute right MCA stroke     Left hemiplegia     Decreased strength, endurance, and mobility     Balance problems      Physician: Magi Amezcua MD    Physician Orders: PT Eval and Treat   Medical Diagnosis from Referral:   I69.311 (ICD-10-CM) - 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   I63.511 (ICD-10-CM) - Acute right MCA stroke   G81.94 (ICD-10-CM) - Left hemiplegia     Evaluation Date: 10/24/2022  Authorization Period Expiration: 12/31/2022  Plan of Care Expiration: 12/24/2022  Visit # / Visits authorized: 1/ 1  *FOTO not captured this session*    PN due: 11/24/22    Time In: 9:35 AM  Time Out: 10:10 AM  Total Billable Time: 35 minutes    Precautions: Standard    Subjective   Date of onset: September 2022  History of current condition - Debo reports: CVA in September. In the hospital for 5 days. States she had home health PT/OT/ST. States she is having issues with her memory, also notices some issues with task completion (putting clothes on backwards). States her walking is close to the same as before the CVA. States she gets off balance sometimes, hard to say if it is worse than before the stroke. No falls to report. States she was walking with  RW when leaving the hospital, now she has started to walk without RW. States she notices her strength in her legs is better since the stroke. Does keep up with her home program from  PT (standing hip abduction, standing squats, heel raises)      Medical History:   Past Medical History:   Diagnosis Date    Aortic valve disorders 7/4/2013    Carotid stenosis 6/3/2013    2/3/2011: L CEA for 80%+ stenosis with small ulcer. Mild WILLIAM disease.    Coronary artery  disease 11/28/2017 11/28/2017: OB: Cath: LAD: 80% involving D1. D1 osteal 80%. LV: Normal systolic function. LAD: LM 3.0 x 18 mm. D1: PTCA 2.5 mm.    Heart failure, diastolic 6/3/2013    History of coronary artery stent placement 11/28/2017 11/28/2017: OB: Cath: LAD: LM 3.0 x 18 mm. D1: PTCA 2.5 mm.    Hypercholesterolemia 7/4/2013    Cannot tolerate statins -muscle pains.    Hypertension, benign 6/3/2013    Diagnosed 1955.       Surgical History:   Debo Ceballos  has a past surgical history that includes Carotid endarterectomy; Hysterectomy; Tonsillectomy; Appendectomy; Cataract extraction, bilateral; and Bunionectomy (Bilateral).    Medications:   Debo has a current medication list which includes the following prescription(s): clorazepate, losartan, meloxicam, metoprolol succinate, oxybutynin, sertraline, spironolactone, and torsemide.    Allergies:   Review of patient's allergies indicates:   Allergen Reactions    Aspirin Other (See Comments)     Bruising of fingers.    Apresoline [hydralazine]     Statins-hmg-coa reductase inhibitors      Cramps in legs and feet.    Streptomycin      Told never to take it would not make it to hospital        Imaging, : none relevant in chart    Prior Therapy: HH PT/OT/ST following CVA  Falls: none   DME: RW, not currently using  Home Environment: lives by herself, daughter lives next door, 1 step to enter, doesn't use step, comes out garage   Occupation: retired  Prior Level of Function: independent   Current Level of Function: issues with task completion secondary to memory, not secondary to physical limitations     Pain:  None reported this date    Objective     - Follows commands: yes   - Speech: no deficits noted, some cognition complaints and scheduled for ST Friday     Mental status: alert, oriented to person, place, and time, normal mood, behavior, speech, dress, motor activity, and thought processes  Appearance: Well groomed  Behavior:  calm,  cooperative, and adequate rapport can be established  Attention Span and Concentration:  Normal    Posture Alignment :slouched posture    Skin integrity:  Intact    Sensation:  Light Touch: Intact           Proprioception:   Intact    ROM:   UPPER EXTREMITY--AROM/PROM  (R) UE: WFLs  (L) UE: WFLs           RANGE OF MOTION--LOWER EXTREMITIES  (R) LE Hip: normal   Knee: normal   Ankle: normal    (L) LE: Hip: normal   Knee: normal   Ankle: normal    Strength: manual muscle test grades below   Upper Extremity Strength  Grossly 4 to 4+/5    Lower Extremity Strength   RLE LLE   Hip Flexion: 4-/5 5/5   Hip Extension:  NT NT   Hip Abduction:  seated 4/5 4/5   Hip Adduction:  seated 4/5 4/5   Knee Extension: 5/5 5/5   Knee Flexion:  seated 5/5 5/5   Ankle Dorsiflexion: 5/5 5/5   Ankle Plantarflexion:  seated 5/5 5/5   Ankle Inversion: 5/5 5/5   Ankle Eversion: 5/5 4/5 (pain in L 5th toe)        Evaluation   5 times sit-stand 15 seconds  >12 sec= fall risk for general elderly  >16 sec= fall risk for Parkinson's disease  >10 sec= balance/vestibular dysfunction (<59 y/o)  >14.2 sec= balance/vestibular dysfunction (>59 y/o)  >12 sec= fall risk for CVA     Postural control:  MCTSIB:  1. Eyes Open/feet together/Firm: 30 seconds  2. Eyes Closed/feet together/Firm: 2 seconds  3. Eyes Open/feet together/Foam: 30 seconds, min sway   4. Eyes Closed/feet together/Foam: NT    Gait Assessment:   - AD used: joyce  - Assistance: mod I  - Distance: 100 ft (in and out of clinic)    GAIT DEVIATIONS:  Gait component performance:   Decreased step length B  Decreased reciprocal arm swing  Decreased pelvic rotation throughout gait  Decreased gait speed  * Above impairments indicate decreased gait safety and increased fall risk.     Evaluation   Timed Up and Go 13.3 sec  < 20 sec safe for independent transfers,     < 30 sec assist required for transfers   6 meter walk test 0.75 m/sec (6m/8s)     Functional Gait Assessment:   1. Gait on level surface =   1   (3) Normal: less than 5.5 sec, no A.D., no imbalance, normal gait pattern, deviates< 6in   (2) Mild impairment: 7-5.6 sec, uses A.D., mild gait deviations, or deviates 6-10 in   (1) Moderate impairment: > 7 sec, slow speed, imbalance, deviates 10-15 in.   (0) Severe impairment: needs assist, deviates >15 in, reach/touch wall  2. Change in Gait Speed = 2   (3) Normal: smooth change w/o loss of balance or gait deviation, deviates < 6 in, significant difference between speeds   (2) Mild impairment: changes speed, but demonstrates mild gait deviations, deviates 6-10 in, OR no deviations but unable to significantly speed, OR uses A.D.   (1) Moderate impairment: minor changes to speed, OR changes speed w/ significant deviations, deviates 10-15 in, OR  Changes speed , but loses balance & recovers   (0) Severe impairment: cannot change speed, deviates >15 in, or loses balance & needs assist  3. Gait with horizontal head turns  = 2   (3) Normal: no change in gait, deviates <6 in   (2) Mild impairment: slight change in speed, deviates 6-10 in, OR uses A.D.   (1) Moderate impairment: moderate change in speed, deviates 10-15 in   (0) Severe impairment: severe disruption of gait, deviates >15in  4. Gait with vertical head turns = 2   (3) Normal: no change in gait, deviates <6 in   (2) Mild impairment: slight change in speed, deviates 6-10 in OR uses A.D.   (1) Moderate impairment: moderate change in speed, deviates 10-15 in   (0) Severe impairment: severe disruption of gait, deviates >15 in  5. Gait with pivot turns = 1   (3) Normal: performs safely in 3 sec, no LOB   (2) Mild impairment: performs in >3 sec & no LOB, OR turns safely & requires several steps to regain LOB   (1) Moderate impairment: turns slow, OR requires several small steps for balance following turn & stop   (0) Severe impairment: cannot turn safely, needs assist  6. Step over obstacle = 2   (3) Normal: steps over 2 stacked boxes w/o change in speed or  LOB   (2) Mild impairment: able to step over 1 box w/o change in speed or LOB   (1) Moderate impairment: steps over 1 box but must slow down, may require VC   (0) Severe impairment: cannot perform w/o assist  7. Gait with Narrow DOMENICA = 0   (3) Normal: 10 steps no staggering   (2) Mild impairment: 7-9 steps   (1) Moderate impairment: 4-7 steps   (0) Severe impairment: < 4 steps or cannot perform w/o assist  8. Gait with eyes closed = 0   (3) Normal: < 7 sec, no A.D., no LOB, normal gait pattern, deviates <6 in   (2) Mild impairment: 7.1-9 sec, mild gait deviations, deviates 6-10 in   (1) Moderate impairment: > 9 sec, abnormal pattern, LOB, deviates 10-15 in   (0) Severe impairment: cannot perform w/o assist, LOB, deviates >15in  9. Ambulating Backwards = 1   (3) Normal: no A.D., no LOB, normal gait pattern, deviates <6in   (2) Mild impairment: uses A.D., slower speed, mild gait deviations, deviates 6-10 in   (1) Moderate impairment: slow speed, abnormal gait pattern, LOB, deviates 10-15 in   (0) Severe impairment: severe gait deviations or LOB, deviates >15in  10. Steps = 2   (3) Normal: alternating feet, no rail   (2) Mild Impairment: alternating feet, uses rail   (1) Moderate impairment: step-to, uses rail   (0) Severe impairment: cannot perform safely    Score 13/30     Score:   <22/30 fall risk   <20/30 fall risk in older adults   <18/30 fall risk in Parkinsons   Scores by Decade:                        Age    Score                        40-49  (24-30)                       50-59  (25-30)                       60-69  (20-30)                       70-79  (16-30)  RENITA Arredondo. (2007). Reference Group Data for the Functional Gait Assessment. Physical Therapy ([phone], [id number].       *FOTO not captured this session*    TREATMENT   Treatment Time In: 00  Treatment Time Out: 00  Total Treatment time separate from Evaluation: 00 minutes       Home Exercises and Patient Education Provided    Education provided:   - role  of PT, plan of care, goals, scheduling limitations, cancellation policies    Written Home Exercises Provided:  HEP to be provided next session .    Assessment   Debo is a 89 y.o. female referred to outpatient Physical Therapy with a medical diagnosis of acute R MCA stroke and L hemiplegia. Pt presents with primary concerns of memory issues, states she feels her physical mobility is at baseline prior to CVA. Pt with fair strength of B LE, no significant abnormalities noted. Pt with TUG score of 13.3, right below cut off score indicating increased risk for falls. Pt with significant limitation in vestibular response and poor balance with vision eliminated as indicated by failure of conditions 2 and 4 of MCTSIB. Pt also with score of 13 on FGA indicative of increased risk for falls. Generally, pt appears to be at baseline for LE strength prior to CVA. Pt with some balance limitations listed above putting pt at increased risk for falls. Pt would benefit from skilled PT services in order to establish updated HEP and challenge vestibular response to improve safety. Pt and daughter in agreement and motivated to participate in therapy. Pt also scheduled to see ST on Friday to address memory concerns.     Pt prognosis is Good.   Pt will benefit from skilled outpatient Physical Therapy to address the deficits stated above and in the chart below, provide pt/family education, and to maximize pt's level of independence.     Plan of care discussed with patient: Yes  Pt's spiritual, cultural and educational needs considered and patient is agreeable to the plan of care and goals as stated below:     Anticipated Barriers for therapy: none    Medical Necessity is demonstrated by the following  History  Co-morbidities and personal factors that may impact the plan of care Co-morbidities:   See as above.    Personal Factors:   no deficits     low   Examination  Body Structures and Functions, activity limitations and participation  restrictions that may impact the plan of care Body Regions:   lower extremities  upper extremities  trunk    Body Systems:    strength  balance  gait  transfers  motor control    Participation Restrictions:   Ability to safely ambulate in community    Activity limitations:   Learning and applying knowledge  no deficits    General Tasks and Commands  no deficits    Communication  no deficits    Mobility  lifting and carrying objects  walking    Self care  no deficits    Domestic Life  shopping  cooking  doing house work (cleaning house, washing dishes, laundry)    Interactions/Relationships  no deficits    Life Areas  no deficits    Community and Social Life  no deficits         low   Clinical Presentation stable and uncomplicated low   Decision Making/ Complexity Score: low     Short Term Goals- 4 Weeks  Pt to demonstrate independence in performance of HEP in order to improve daily level of physical activity and improve carry over session to session.  Pt to improve B LE strength by > / = 1/2 MMT score in order to improve strength for daily activities.  Pt to demonstrate 5 time sit to stand score of < / = 12 seconds in order to maximize functional independence and functional strength.  Pt to demonstrate ability to balance for > / = 15 seconds on all MCTSIB conditions with minimal sway in order to improve vestibular response and decrease fall risk.     Long Term Goals - 12 Weeks  Pt to demonstrate compliance with HEP > / = 3x per week in order to improve daily level of physical activity and improve carry over session to session.  Pt to improve B LE strength by > / = 1 MMT score in order to improve strength for daily activities.  Pt to demonstrate TUG score of < / = 10 seconds in order to decrease fall risk and maximize functional strength for daily activities.  Pt to demonstrate 5 time sit to stand score of < / = 10 seconds in order to maximize functional independence and functional strength.  Pt to demonstrate FGA  score of > / = 18 in order to maximize dynamic balance for ADLs and decrease fall risk.   Pt to improve self selected walking speed to > / = 0.85 m/s in order to maximize safety and confidence in household and community mobility.   Pt to demonstrate ability to balance for > / = 30 seconds on all MCTSIB conditions with no sway in order to improve vestibular response and decrease fall risk.     Plan   Plan of care Certification: 10/24/2022 to 12/24/22.    Outpatient Physical Therapy 1 times weekly for 8 weeks to include the following interventions: Gait Training, Manual Therapy, Moist Heat/ Ice, Neuromuscular Re-ed, Patient Education, Therapeutic Activities, and Therapeutic Exercise.     Paulina Read, PT, DPT  10/24/2022

## 2022-10-28 ENCOUNTER — CLINICAL SUPPORT (OUTPATIENT)
Dept: REHABILITATION | Facility: HOSPITAL | Age: 87
End: 2022-10-28
Attending: FAMILY MEDICINE
Payer: MEDICARE

## 2022-10-28 DIAGNOSIS — I63.9 CEREBROVASCULAR ACCIDENT (CVA), UNSPECIFIED MECHANISM: ICD-10-CM

## 2022-10-28 DIAGNOSIS — I63.511 ACUTE RIGHT MCA STROKE: ICD-10-CM

## 2022-10-28 DIAGNOSIS — G81.94 LEFT HEMIPLEGIA: ICD-10-CM

## 2022-10-28 DIAGNOSIS — I69.311: ICD-10-CM

## 2022-10-28 PROCEDURE — 96125 COGNITIVE TEST BY HC PRO: CPT | Mod: PN

## 2022-10-28 NOTE — PLAN OF CARE
Outpatient Neurological Rehabilitation  Speech and Language Therapy Evaluation    Date: 10/28/2022     Name: Debo Ceballos   MRN: [id number]    Therapy Diagnosis:   Encounter Diagnoses   Name Primary?    Left hemiplegia     Acute right MCA stroke     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     Cerebrovascular accident (CVA), unspecified mechanism       Physician: Magi Amezcua MD  Physician Orders: [id number] - AMB REFERRAL/CONSULT TO SPEECH THERAPY  Medical Diagnosis from Referral: Left hemiplegia [G81.94], Acute right MCA stroke [I63.511], 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 [I69.311]    Visit #/Visits authorized: 1/ 1  Date of Evaluation:  10/28/2022   Insurance Authorization Period: 10/19/22-12/31/22   Plan of Care Expiration:  10/28/2022 to 12/9/22     Time In: 9:30am  Time Out: 10:10am  Test interpretation time:  Procedure Min.   Cognitive Communication Evaluation  40 mins          Precautions:Standard and Fall  Subjective   Date of Onset: 9/12/22  History of Current Condition:   Debo Ceballos is a 89 y.o. female female who presents to [facility] for evaluation secondary to CVA. Patient was referred to therapy by Magi Amezcua MD , which is the patient's family medicine doctor. Patient reports she was seeing home health a few times, however they stopped coming. Currently she endorses difficulty in memory and putting her clothes on backwards. She additionally reports pre-stroke she was very familiar with the phone, however now has increased difficulty and does not use it as much.     Past Medical History: Debo Ceballos  has a past medical history of Aortic valve disorders (7/4/2013), Carotid stenosis (6/3/2013), Coronary artery disease (11/28/2017), Heart failure, diastolic (6/3/2013), History of coronary artery stent placement (11/28/2017), Hypercholesterolemia (7/4/2013), and Hypertension, benign  (6/3/2013).  Debo Ceballos  has a past surgical history that includes Carotid endarterectomy; Hysterectomy; Tonsillectomy; Appendectomy; Cataract extraction, bilateral; and Bunionectomy (Bilateral).  Medical Hx and Allergies:  Debo has a current medication list which includes the following prescription(s): clorazepate, losartan, meloxicam, metoprolol succinate, oxybutynin, sertraline, spironolactone, and torsemide.   Review of patient's allergies indicates:   Allergen Reactions    Aspirin Other (See Comments)     Bruising of fingers.    Apresoline [hydralazine]     Statins-hmg-coa reductase inhibitors      Cramps in legs and feet.    Streptomycin      Told never to take it would not make it to hospital     Imaging: no relevant images in chart   Prior Therapy:   speech therapy few session  Social History:  lives alone; daughter lives next door   Prior Level of Function: no difficulties    Current Level of Function: difficulty with memory   Pain:   0/10  Pain Location / Description: n/a  Nutrition:  IDDSI 0 (Thin) and IDDSI 7 (Regular)  Patient's Therapy Goals:  memory   Objective   Formal Assessment:  Cognitive Linguistic Quick Test (CLQT), Aphasia Administration was administered to quickly assess the patient's overall cognitive-linguistic function and to determine cognitive strengths and weaknesses.     Cognitive Domain  Score  Severity Rating    Attention 128/215 mild   Memory 126/185 mild   Executive Functions 13/40 moderate   Language 28/37 WFL   Visuospatial Skills 49/105 mild   Clock Drawing 2/13 severe          Composite Score = 3/4 mild     Task Score Ages 70-89 Cut Score Below?   Personal Facts  8  8 average   Symbol Cancellation 10  10 average   Confrontational naming  10  10 average   Clock drawing  2  11 below average   Story Retelling 6  5 above average   Symbol Trails 2  6 below average   Generative Naming 4  4 average   Design Memory 3  4 below average   Mazes 3  4 below average   Design  Generation  4  5 below average       Cognition: Cognitive communication skills are considered mildly impaired. Patient answered orientation questions with 100% accuracy. Patient cancelled pre-determined symbol out of a field of many similar looking symbols with 83% accuracy, indicating reduced selective attention skills. Patient named line item photographs with 100% accuracy. Patient scored 2  (cut off score 12) on clock drawing task, indicating reduced planning, organizing, self-monitoring, and self-correction as the patient's clock model contained 0/12 numbers , 2/2 hands, and was not set to the correct time. Patient recalled 9/18 details from a paragraph presented auditorily. Patient answered 4/6 y/n questions about the paragraph indicating reduced  comprehension and auditory recall. When asked to recall the details upon conclusion of the test, patient was able to recall 6 details. Patient completed a symbol trails task (alternating size and shape) with 20% acc indicating reduced divided attention.  Patient completed divergent naming of concrete categories with 11 named items within one minute; completed divergent naming of abstract categories with 5 named items within one minute.  Patient recalled  3/6 designs in a design recall task indicating reduced visual recall skills. Patient completed a simple maze with  75% acc; completed a complex maze with 0% acc indicating  reduced planning and organization for simple and complex information. Patient created 4 designs with 4 lines, 0 designs with more than 4 lines, 3 designs with less than 4 lines, and 3 perseverative designs indicating reduced mental flexibility skills.  Patient was alert and cooperative throughout evaluation. She was oriented to person, time, place, and situation. She did require cues (re-explanation) to attend to evaluation tasks throughout session. Patient with adequate insight into severity of deficits.      Auditory Comprehension: No concerns  reported or observed; WFL for the purpose of assessment and conversation.    Verbal Expression: No concerns reported or observed; WFL for the purpose of assessment and conversation.     Reading Comprehension:  Did not assess. No concerns reported or observed.        Written Expression:  Did not assess. No concerns reported or observed.          Motor Speech/Fluency/Voice:  Pt's motor speech, speech intelligibility, fluency, and voice were judged to be WFL     Swallowing: No swallowing difficulties reported at this time.     Hearing / Vision: Noted difficulty with hearing, clinician projected voice throughout session.       Treatment   Treatment Time In: n/a  Treatment Time Out: n/a  Total Treatment Time: n/a  no treatment performed 2/2 time to complete evaluation.    Education: Plan of Care, role of SLP in care, course of medical disease affect on therapy diagnosis , and scheduling/ cancellation policy was discussed with pt. Patient and/or family members expressed understanding.     Home Program: not yet established   Assessment     Debo presents to [facility] [place] s/p medical diagnosis of Left hemiplegia [G81.94], Acute right MCA stroke [I63.511], 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 .  Demonstrates impairments including limitations as described in the problem list. She presents with mild cognitive impairment c/b mildly reduced memory, attention, visuospatial skills, and moderately reduced executive functioning.  Positive prognostic factors include family support and motivation. Negative prognostic factors include none. No barriers to therapy identified. Patient will benefit from skilled, outpatient neurological rehabilitation speech therapy.    Rehab Potential: good  Pt's spiritual, cultural and educational needs considered and pt agreeable to plan of care and goals.    Short Term Goals (4 weeks):   Patient will complete tasks requiring divided attention and  alternating attention with 90% accuracy given min cues to improve attention skills   2. Patient will immediately recall specific details from information recently seen or heard using memory retrieval strategies with 90% accuracy given min cues to improve auditory and visual recall.   3. Patient will complete delayed memory tasks using memory retrieval strategies with 90% accuracy given min cues  4. Patient will complete simple -moderate reasoning/problem solving tasks with 90% accuracy given min cues  5. Patient will complete mental manipulation tasks with 90% accuracy given min cues to improve working memory/mental flexibility.    6. Patient will identify mistakes in a medication sorter with 80% accuracy and minimal cues to increase functional attention and problem solving.  7. Patient will sequence 6 step activities when presented written to the patient with 80% accuracy and moderate cues.        Long Term Goals (6 weeks):   She will use appropriate memory strategies to schedule and recall weekly activities, express needs and recall names to maintain safety and participate socially in functional living environment.   Pt will improve  attention skills to effectively attend to and communicate in simple daily living tasks in functional living environment.   Pt will demonstrate use of  during daily living activities to improve safety and awareness in functional living environment.  Pt will apply problem-solving strategies with no visual support in daily living functional activities at home and in the community.    Pt will predict objective performance on completion of actual tasks to increase independence with required return to daily living environment.     Plan     Plan of Care Certification Period: 10/28/2022  to 12/9/22    Recommended Treatment Plan:  Patient will participate in the [facility] rehabilitation program for speech therapy 1 times per week to address her  Cognition deficits, to educate  patient and their family, and to participate in a home exercise program.    Other Recommendations: none    Therapist's Name:   ANN Gillette (Ali), CCC-SLP  Speech Language Pathologist   10/28/2022

## 2022-10-28 NOTE — PROGRESS NOTES
Outpatient Neurological Rehabilitation  Speech and Language Therapy Evaluation    Date: 10/28/2022     Name: Dbeo Ceballos   MRN: [id number]    Therapy Diagnosis:   Encounter Diagnoses   Name Primary?    Left hemiplegia     Acute right MCA stroke     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     Cerebrovascular accident (CVA), unspecified mechanism       Physician: Magi Amezcua MD  Physician Orders: [id number] - AMB REFERRAL/CONSULT TO SPEECH THERAPY  Medical Diagnosis from Referral: Left hemiplegia [G81.94], Acute right MCA stroke [I63.511], 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 [I69.311]    Visit #/Visits authorized: 1/ 1  Date of Evaluation:  10/28/2022   Insurance Authorization Period: 10/19/22-12/31/22   Plan of Care Expiration:  10/28/2022 to 12/9/22     Time In: 9:30am  Time Out: 10:10am  Test interpretation time:  Procedure Min.   Cognitive Communication Evaluation  40 mins          Precautions:Standard and Fall  Subjective   Date of Onset: 9/12/22  History of Current Condition:   Debo Ceballos is a 89 y.o. female female who presents to [facility] for evaluation secondary to CVA. Patient was referred to therapy by Magi Amezcua MD , which is the patient's family medicine doctor. Patient reports she was seeing home health a few times, however they stopped coming. Currently she endorses difficulty in memory and putting her clothes on backwards. She additionally reports pre-stroke she was very familiar with the phone, however now has increased difficulty and does not use it as much.     Past Medical History: Debo Ceballos  has a past medical history of Aortic valve disorders (7/4/2013), Carotid stenosis (6/3/2013), Coronary artery disease (11/28/2017), Heart failure, diastolic (6/3/2013), History of coronary artery stent placement (11/28/2017), Hypercholesterolemia (7/4/2013), and Hypertension, benign  (6/3/2013).  Debo Ceballos  has a past surgical history that includes Carotid endarterectomy; Hysterectomy; Tonsillectomy; Appendectomy; Cataract extraction, bilateral; and Bunionectomy (Bilateral).  Medical Hx and Allergies:  Debo has a current medication list which includes the following prescription(s): clorazepate, losartan, meloxicam, metoprolol succinate, oxybutynin, sertraline, spironolactone, and torsemide.   Review of patient's allergies indicates:   Allergen Reactions    Aspirin Other (See Comments)     Bruising of fingers.    Apresoline [hydralazine]     Statins-hmg-coa reductase inhibitors      Cramps in legs and feet.    Streptomycin      Told never to take it would not make it to hospital     Imaging: no relevant images in chart   Prior Therapy:   speech therapy few session  Social History:  lives alone; daughter lives next door   Prior Level of Function: no difficulties    Current Level of Function: difficulty with memory   Pain:   0/10  Pain Location / Description: n/a  Nutrition:  IDDSI 0 (Thin) and IDDSI 7 (Regular)  Patient's Therapy Goals:  memory   Objective   Formal Assessment:  Cognitive Linguistic Quick Test (CLQT), Aphasia Administration was administered to quickly assess the patient's overall cognitive-linguistic function and to determine cognitive strengths and weaknesses.     Cognitive Domain  Score  Severity Rating    Attention 128/215 mild   Memory 126/185 mild   Executive Functions 13/40 moderate   Language 28/37 WFL   Visuospatial Skills 49/105 mild   Clock Drawing 2/13 severe          Composite Score = 3/4 mild     Task Score Ages 70-89 Cut Score Below?   Personal Facts  8  8 average   Symbol Cancellation 10  10 average   Confrontational naming  10  10 average   Clock drawing  2  11 below average   Story Retelling 6  5 above average   Symbol Trails 2  6 below average   Generative Naming 4  4 average   Design Memory 3  4 below average   Mazes 3  4 below average   Design  Generation  4  5 below average       Cognition: Cognitive communication skills are considered mildly impaired. Patient answered orientation questions with 100% accuracy. Patient cancelled pre-determined symbol out of a field of many similar looking symbols with 83% accuracy, indicating reduced selective attention skills. Patient named line item photographs with 100% accuracy. Patient scored 2  (cut off score 12) on clock drawing task, indicating reduced planning, organizing, self-monitoring, and self-correction as the patient's clock model contained 0/12 numbers , 2/2 hands, and was not set to the correct time. Patient recalled 9/18 details from a paragraph presented auditorily. Patient answered 4/6 y/n questions about the paragraph indicating reduced  comprehension and auditory recall. When asked to recall the details upon conclusion of the test, patient was able to recall 6 details. Patient completed a symbol trails task (alternating size and shape) with 20% acc indicating reduced divided attention.  Patient completed divergent naming of concrete categories with 11 named items within one minute; completed divergent naming of abstract categories with 5 named items within one minute.  Patient recalled  3/6 designs in a design recall task indicating reduced visual recall skills. Patient completed a simple maze with  75% acc; completed a complex maze with 0% acc indicating  reduced planning and organization for simple and complex information. Patient created 4 designs with 4 lines, 0 designs with more than 4 lines, 3 designs with less than 4 lines, and 3 perseverative designs indicating reduced mental flexibility skills.  Patient was alert and cooperative throughout evaluation. She was oriented to person, time, place, and situation. She did require cues (re-explanation) to attend to evaluation tasks throughout session. Patient with adequate insight into severity of deficits.      Auditory Comprehension: No concerns  reported or observed; WFL for the purpose of assessment and conversation.    Verbal Expression: No concerns reported or observed; WFL for the purpose of assessment and conversation.     Reading Comprehension:  Did not assess. No concerns reported or observed.        Written Expression:  Did not assess. No concerns reported or observed.          Motor Speech/Fluency/Voice:  Pt's motor speech, speech intelligibility, fluency, and voice were judged to be WFL     Swallowing: No swallowing difficulties reported at this time.     Hearing / Vision: Noted difficulty with hearing, clinician projected voice throughout session.       Treatment   Treatment Time In: n/a  Treatment Time Out: n/a  Total Treatment Time: n/a  no treatment performed 2/2 time to complete evaluation.    Education: Plan of Care, role of SLP in care, course of medical disease affect on therapy diagnosis , and scheduling/ cancellation policy was discussed with pt. Patient and/or family members expressed understanding.     Home Program: not yet established   Assessment     Debo presents to [facility] s/p medical diagnosis of Left hemiplegia [G81.94], Acute right MCA stroke [I63.511], 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 .  Demonstrates impairments including limitations as described in the problem list. She presents with mild cognitive impairment c/b mildly reduced memory, attention, visuospatial skills, and moderately reduced executive functioning.  Positive prognostic factors include family support and motivation. Negative prognostic factors include none. No barriers to therapy identified. Patient will benefit from skilled, outpatient neurological rehabilitation speech therapy.    Rehab Potential: good  Pt's spiritual, cultural and educational needs considered and pt agreeable to plan of care and goals.    Short Term Goals (4 weeks):   Patient will complete tasks requiring divided attention and  alternating attention with 90% accuracy given min cues to improve attention skills   2. Patient will immediately recall specific details from information recently seen or heard using memory retrieval strategies with 90% accuracy given min cues to improve auditory and visual recall.   3. Patient will complete delayed memory tasks using memory retrieval strategies with 90% accuracy given min cues  4. Patient will complete simple -moderate reasoning/problem solving tasks with 90% accuracy given min cues  5. Patient will complete mental manipulation tasks with 90% accuracy given min cues to improve working memory/mental flexibility.    6. Patient will identify mistakes in a medication sorter with 80% accuracy and minimal cues to increase functional attention and problem solving.  7. Patient will sequence 6 step activities when presented written to the patient with 80% accuracy and moderate cues.        Long Term Goals (6 weeks):   She will use appropriate memory strategies to schedule and recall weekly activities, express needs and recall names to maintain safety and participate socially in functional living environment.   Pt will improve  attention skills to effectively attend to and communicate in simple daily living tasks in functional living environment.   Pt will demonstrate use of  during daily living activities to improve safety and awareness in functional living environment.  Pt will apply problem-solving strategies with no visual support in daily living functional activities at home and in the community.    Pt will predict objective performance on completion of actual tasks to increase independence with required return to daily living environment.     Plan     Plan of Care Certification Period: 10/28/2022  to 12/9/22    Recommended Treatment Plan:  Patient will participate in the [facility] program for speech therapy 1 times per week to address her  Cognition deficits, to educate  patient and their family, and to participate in a home exercise program.    Other Recommendations:  none    Therapist's Name:   ANN Gilletet (Ali), CCC-SLP  Speech Language Pathologist   10/28/2022

## 2022-10-31 PROBLEM — R68.89 DECREASED STRENGTH, ENDURANCE, AND MOBILITY: Status: ACTIVE | Noted: 2022-10-31

## 2022-10-31 PROBLEM — Z74.09 DECREASED STRENGTH, ENDURANCE, AND MOBILITY: Status: ACTIVE | Noted: 2022-10-31

## 2022-10-31 PROBLEM — R26.89 BALANCE PROBLEMS: Status: ACTIVE | Noted: 2022-10-31

## 2022-10-31 PROBLEM — R53.1 DECREASED STRENGTH, ENDURANCE, AND MOBILITY: Status: ACTIVE | Noted: 2022-10-31

## 2022-11-11 ENCOUNTER — CLINICAL SUPPORT (OUTPATIENT)
Dept: REHABILITATION | Facility: HOSPITAL | Age: 87
End: 2022-11-11
Attending: FAMILY MEDICINE
Payer: MEDICARE

## 2022-11-11 DIAGNOSIS — R53.1 DECREASED STRENGTH, ENDURANCE, AND MOBILITY: Primary | ICD-10-CM

## 2022-11-11 DIAGNOSIS — I63.9 CEREBROVASCULAR ACCIDENT (CVA), UNSPECIFIED MECHANISM: Primary | ICD-10-CM

## 2022-11-11 DIAGNOSIS — Z74.09 DECREASED STRENGTH, ENDURANCE, AND MOBILITY: Primary | ICD-10-CM

## 2022-11-11 DIAGNOSIS — R26.89 BALANCE PROBLEMS: ICD-10-CM

## 2022-11-11 DIAGNOSIS — R68.89 DECREASED STRENGTH, ENDURANCE, AND MOBILITY: Primary | ICD-10-CM

## 2022-11-11 PROCEDURE — 97130 THER IVNTJ EA ADDL 15 MIN: CPT | Mod: PN

## 2022-11-11 PROCEDURE — 97129 THER IVNTJ 1ST 15 MIN: CPT | Mod: PN,GN

## 2022-11-11 PROCEDURE — 97112 NEUROMUSCULAR REEDUCATION: CPT | Mod: PN,GP

## 2022-11-11 PROCEDURE — 97110 THERAPEUTIC EXERCISES: CPT | Mod: PN

## 2022-11-11 NOTE — PROGRESS NOTES
[facility]   Physical Therapy Treatment Note     Name: Debo TAPIA [facility]  Clinic Number: [id number]    Therapy Diagnosis:   Encounter Diagnoses   Name Primary?    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  She was not compliant with home exercise program as HEP not yet provided.  Response to previous treatment: none  Functional change: ongoing    Pain: 0/10  Location: none reported    OBJECTIVE     Objective Measures updated at progress report unless specified.       TREATMENT     Debo received the treatments listed below:      received therapeutic exercises to develop strength and ROM for 35  minutes including:    Nu-Step x 8 min - L3    Standing hip abduction YTB 3x10  Standing hip extension YTB 3x10  Standing heel raises 3x10    Lateral walking YTB x 6 laps - 2 trials    Sit to stands from low mat 2x10 - no UE support    neuromuscular re-education activities to improve: Balance, Coordination, and Proprioception for 10 minutes. The following activities were included:    NBOS, firm, eyes open 3x30 sec  NBOS, firm, eyes closed 3x30 - one finger support  Semi Tandem 2x30 sec eyes open  Semi Tande 2x30 sec eyes closed    PATIENT EDUCATION AND HOME EXERCISES     Home Exercises Provided and  Patient Education Provided     Education provided:   - role of PT, plan of care, goals, scheduling limitations, cancellation policies  - HEP performance    Written Home Exercises Provided: yes. Exercises were reviewed and Debo was able to demonstrate them prior to the end of the session.  Debo demonstrated good  understanding of the education provided. See EMR under Patient Instructions for exercises provided during therapy sessions    ASSESSMENT     Pt tolerated initial treatment session well with no adverse response noted. Pt with appropriate muscular challenge to all standing hip strengthening activities. No outright LOB noted on balance challenges this session. However, intermittent one finger support utilized for safety with vision eliminated activities. Pt given HEP including standing strengthening and eyes open balance challenges. Pt able to return demo safely. Pt remains appropriate for therapy at this time.     Debo Is progressing well towards her goals.   Pt prognosis is Good.     Pt will continue to benefit from skilled outpatient physical therapy to address the deficits listed in the problem list box on initial evaluation, provide pt/family education and to maximize pt's level of independence in the home and community environment.     Pt's spiritual, cultural and educational needs considered and pt agreeable to plan of care and goals.     Anticipated barriers to physical therapy: none    Short Term Goals- 4 Weeks  Pt to demonstrate independence in performance of HEP in order to improve daily level of physical activity and improve carry over session to session.  Pt to improve B LE strength by > / = 1/2 MMT score in order to improve strength for daily activities.  Pt to demonstrate 5 time sit to stand score of < / = 12 seconds in order to maximize functional independence and functional strength.  Pt to demonstrate ability to balance for > / = 15 seconds on all MCTSIB conditions with minimal  sway in order to improve vestibular response and decrease fall risk.      Long Term Goals - 12 Weeks  Pt to demonstrate compliance with HEP > / = 3x per week in order to improve daily level of physical activity and improve carry over session to session.  Pt to improve B LE strength by > / = 1 MMT score in order to improve strength for daily activities.  Pt to demonstrate TUG score of < / = 10 seconds in order to decrease fall risk and maximize functional strength for daily activities.  Pt to demonstrate 5 time sit to stand score of < / = 10 seconds in order to maximize functional independence and functional strength.  Pt to demonstrate FGA score of > / = 18 in order to maximize dynamic balance for ADLs and decrease fall risk.   Pt to improve self selected walking speed to > / = 0.85 m/s in order to maximize safety and confidence in household and community mobility.   Pt to demonstrate ability to balance for > / = 30 seconds on all MCTSIB conditions with no sway in order to improve vestibular response and decrease fall risk.     PLAN     Plan of care Certification: 10/24/2022 to 12/24/22.     Current POC: Outpatient Physical Therapy 1 times weekly for 8 weeks to include the following interventions: Gait Training, Manual Therapy, Moist Heat/ Ice, Neuromuscular Re-ed, Patient Education, Therapeutic Activities, and Therapeutic Exercise.     Continue standing LE strengthening and vestibular challenges as appropriate.     Paulina Read, PT, DPT  11/11/2022

## 2022-11-11 NOTE — PROGRESS NOTES
[facility]  Speech Therapy Treatment Note- Neurological Rehabilitation  Date: 11/11/2022     Name: Debo Ceballos   MRN: [id number]   Therapy Diagnosis:   Encounter Diagnosis   Name Primary?    Cerebrovascular accident (CVA), unspecified mechanism Yes   Physician: Magi Amezcua MD  Physician Orders: [id number] - AMB REFERRAL/CONSULT TO SPEECH THERAPY  Medical Diagnosis: Hemiplegia, unspecified affecting left nondominant side [G81.94], Cerebral infarction due to unspecified occlusion or stenosis of right middle cerebral artery [I63.511], Memory deficit following cerebral infarction [I69.311]    Visit #/ Visits Authorized: 1/ 20  Date of Evaluation:  10/28/22  Insurance Authorization Period: 10/21/22-12/31/22  Plan of Care Expiration Date:    10/28/2022 to 12/9/22   Extended Plan of Care:  n/a   Progress Note: 11/28/22   Visits Cancelled: 0  Visits No Show: 0    Time In:  8:45am   Time Out:  9:30am   Total Billable Time: 45 minutes     Precautions: Standard  Subjective:   Patient reports: playing cards is hard, example phase 10 and [place]. Reports she forgets to turn faucet off and keep all lights on all night. Forget to flush toilet. Hard time finding things in the refrigerator.    She was compliant to home exercise program.   Response to previous treatment: first after   Pain Scale:  0/10 on a Visual Analog Scale currently.   Pain Location: n/a  Objective:   TIMED  Procedure Min.   Cognitive Therapeutic Interventions, first 15 minutes CPT 15922  15   Cognitive Therapeutic Interventions, each additional 15 minutes CPT 17379  30         UNTIMED  Procedure Min.             Total Timed Units: 3  Total Untimed Units: 0  Charges Billed/Number of units: 3    Short Term Goals: (4 weeks) Current Progress:   1.Patient will complete tasks requiring divided attention and alternating attention with 90% accuracy given min cues to improve attention skills     Progressing/ Not Met 11/11/2022   Not formally  addressed       2. Patient will immediately recall specific details from information recently seen or heard using memory retrieval strategies with 90% accuracy given min cues to improve auditory and visual recall.      Progressing/ Not Met 11/11/2022   Not formally addressed       3. Patient will complete delayed memory tasks using memory retrieval strategies with 90% accuracy given min cues      Progressing/ Not Met 11/11/2022   Not formally addressed       4. Patient will complete simple -moderate reasoning/problem solving tasks with 90% accuracy given min cues      Progressing/ Not Met 11/11/2022   Not formally addressed       5. Patient will complete mental manipulation tasks with 90% accuracy given min cues to improve working memory/mental flexibility.      Progressing/ Not Met 11/11/2022   Not formally addressed       6. Patient will identify mistakes in a medication sorter with 80% accuracy and minimal cues to increase functional attention and problem solving.      Progressing/ Not Met 11/11/2022   Daughter reports not needing to complete due to set organization at home.       7. Patient will sequence 6 step activities when presented written to the patient with 80% accuracy and moderate cues.      Progressing/ Not Met 11/11/2022   Completed 5 step sequencing with 90% accuracy Independently      Noted self correction and additional time needed    Increase complexity next session      Patient Education/Response:   Discussed in detail memory strategies, making a list of things to review prior to leaving a room, and creating a visual sign for morning medication    Home program established: Program modified based on patient progress  Exercises were reviewed and Debo was able to demonstrate them prior to the end of the session.  Debo demonstrated good  understanding of the education provided.     See Electronic Medical Record under Patient Instructions for exercises provided throughout therapy.  Assessment:    Debo is progressing well towards her goals. Extensive education provided today with patient and daughter regarding effects following CVA. Attempted 6 step sequencing however was demonstrated to be too complex at this time and reduced to 5 step.  Patient reporters she would like to work on calendars. Additionally provided visual signs for leaving a bathroom and a reminder to take morning medication.  Current goals remain appropriate. Goals to be updated as necessary.     Patient prognosis is Good. Patient will continue to benefit from skilled outpatient speech and language therapy to address the deficits listed in the problem list on initial evaluation, provide patient/family education and to maximize patient's level of independence in the home and community environment.   Medical necessity is demonstrated by the following IMPAIRMENTS:  Deficits in executive functioning, attention, and memory prevent the pt from relaying medically and safety relevant information in a timely manner in a state of emergency.    Barriers to Therapy: none  Patient's spiritual, cultural and educational needs considered and patient agreeable to plan of care and goals.  Plan:   Continue Plan of Care with focus on memory, attention, and executive functioning.     ANN Gillette, CCC-SLP   11/11/2022

## 2022-11-16 ENCOUNTER — CLINICAL SUPPORT (OUTPATIENT)
Dept: REHABILITATION | Facility: HOSPITAL | Age: 87
End: 2022-11-16
Attending: FAMILY MEDICINE
Payer: MEDICARE

## 2022-11-16 DIAGNOSIS — I63.9 CEREBROVASCULAR ACCIDENT (CVA), UNSPECIFIED MECHANISM: Primary | ICD-10-CM

## 2022-11-16 DIAGNOSIS — R68.89 DECREASED STRENGTH, ENDURANCE, AND MOBILITY: Primary | ICD-10-CM

## 2022-11-16 DIAGNOSIS — R26.89 BALANCE PROBLEMS: ICD-10-CM

## 2022-11-16 DIAGNOSIS — R53.1 DECREASED STRENGTH, ENDURANCE, AND MOBILITY: Primary | ICD-10-CM

## 2022-11-16 DIAGNOSIS — Z74.09 DECREASED STRENGTH, ENDURANCE, AND MOBILITY: Primary | ICD-10-CM

## 2022-11-16 PROCEDURE — 97129 THER IVNTJ 1ST 15 MIN: CPT | Mod: PN,GN

## 2022-11-16 PROCEDURE — 97110 THERAPEUTIC EXERCISES: CPT | Mod: PN

## 2022-11-16 PROCEDURE — 97112 NEUROMUSCULAR REEDUCATION: CPT | Mod: PN

## 2022-11-16 PROCEDURE — 97130 THER IVNTJ EA ADDL 15 MIN: CPT | Mod: PN

## 2022-11-16 NOTE — PROGRESS NOTES
"  [facility]   Physical Therapy Treatment Note     Name: Debo TAPIA [facility]  Clinic Number: [id number]    Therapy Diagnosis:   Encounter Diagnoses   Name Primary?    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 Nervous going up/down curbs.    She was compliant with HEP.  Response to previous treatment: "like I worked out"   Functional change: ongoing    Pain: 0/10  Location: none reported    OBJECTIVE     Objective Measures updated at progress report unless specified.     TREATMENT     Debo received the treatments listed below:      received therapeutic exercises to develop strength and ROM for 20 minutes including:    Recumbent Bike x 8 min - L2    Standing hip abduction YTB 3x10  Standing hip extension YTB 3x10    Lateral walking YTB x 6 laps - 2 trials    neuromuscular re-education activities to improve: Balance, Coordination, and Proprioception for 25 minutes. The following activities were included:    +Step ups to 4 in - one finger support 2x10 ea LE    NBOS, firm, eyes open 2x30 sec  NBOS, firm, eyes closed 3x30 - one finger support  +NBOS, firm, pitch/yaw 2x30 ea  Semi Tandem 2x30 sec eyes open  Semi Tande 2x30 sec eyes " closed    PATIENT EDUCATION AND HOME EXERCISES     Home Exercises Provided and Patient Education Provided     Education provided:   - role of PT, plan of care, goals, scheduling limitations, cancellation policies  - HEP performance    Written Home Exercises Provided: yes. Exercises were reviewed and Debo was able to demonstrate them prior to the end of the session.  Debo demonstrated good  understanding of the education provided. See EMR under Patient Instructions for exercises provided during therapy sessions    ASSESSMENT     Pt tolerated treatment session well with no adverse response noted. Pt with appropriate muscular challenge to all standing hip strengthening activities. No outright LOB noted on balance challenges this session. However, intermittent one finger support utilized for safety with vision eliminated activities. Addition of step ups with limited UE support this session secondary to reported difficulty with curbs. Plan to practice on curb outdoors next session. Pt remains appropriate for therapy at this time.     Debo Is progressing well towards her goals.   Pt prognosis is Good.     Pt will continue to benefit from skilled outpatient physical therapy to address the deficits listed in the problem list box on initial evaluation, provide pt/family education and to maximize pt's level of independence in the home and community environment.     Pt's spiritual, cultural and educational needs considered and pt agreeable to plan of care and goals.     Anticipated barriers to physical therapy: none    Short Term Goals- 4 Weeks  Pt to demonstrate independence in performance of HEP in order to improve daily level of physical activity and improve carry over session to session.  Pt to improve B LE strength by > / = 1/2 MMT score in order to improve strength for daily activities.  Pt to demonstrate 5 time sit to stand score of < / = 12 seconds in order to maximize functional independence and functional  strength.  Pt to demonstrate ability to balance for > / = 15 seconds on all MCTSIB conditions with minimal sway in order to improve vestibular response and decrease fall risk.      Long Term Goals - 12 Weeks  Pt to demonstrate compliance with HEP > / = 3x per week in order to improve daily level of physical activity and improve carry over session to session.  Pt to improve B LE strength by > / = 1 MMT score in order to improve strength for daily activities.  Pt to demonstrate TUG score of < / = 10 seconds in order to decrease fall risk and maximize functional strength for daily activities.  Pt to demonstrate 5 time sit to stand score of < / = 10 seconds in order to maximize functional independence and functional strength.  Pt to demonstrate FGA score of > / = 18 in order to maximize dynamic balance for ADLs and decrease fall risk.   Pt to improve self selected walking speed to > / = 0.85 m/s in order to maximize safety and confidence in household and community mobility.   Pt to demonstrate ability to balance for > / = 30 seconds on all MCTSIB conditions with no sway in order to improve vestibular response and decrease fall risk.     PLAN     Plan of care Certification: 10/24/2022 to 12/24/22.     Current POC: Outpatient Physical Therapy 1 times weekly for 8 weeks to include the following interventions: Gait Training, Manual Therapy, Moist Heat/ Ice, Neuromuscular Re-ed, Patient Education, Therapeutic Activities, and Therapeutic Exercise.     Continue standing LE strengthening and vestibular challenges as appropriate. Curb outside next session as appropriate.     Paulina Read, PT, DPT  11/16/2022

## 2022-11-16 NOTE — PROGRESS NOTES
[facility]  Speech Therapy Treatment Note- Neurological Rehabilitation  Date: 11/16/2022     Name: Debo Ceballos   MRN: [id number]   Therapy Diagnosis:   Encounter Diagnosis   Name Primary?    Cerebrovascular accident (CVA), unspecified mechanism Yes     Physician: Magi Amezcua MD  Physician Orders: [id number] - AMB REFERRAL/CONSULT TO SPEECH THERAPY  Medical Diagnosis: Hemiplegia, unspecified affecting left nondominant side [G81.94], Cerebral infarction due to unspecified occlusion or stenosis of right middle cerebral artery [I63.511], Memory deficit following cerebral infarction [I69.311]    Visit #/ Visits Authorized: 1/ 20  Date of Evaluation:  10/28/22  Insurance Authorization Period: 10/21/22-12/31/22  Plan of Care Expiration Date:    10/28/2022 to 12/9/22   Extended Plan of Care:  n/a   Progress Note: 11/28/22   Visits Cancelled: 0  Visits No Show: 0    Time In:  8:45am   Time Out:  9:30am   Total Billable Time: 45 minutes     Precautions: Standard  Subjective:   Patient reports: she has begun to put a red cup next to her chair where she eats breakfast to remind her to take morning medication.   She was compliant to home exercise program.   Response to previous treatment: first after   Pain Scale:  0/10 on a Visual Analog Scale currently.   Pain Location: n/a  Objective:   TIMED  Procedure Min.   Cognitive Therapeutic Interventions, first 15 minutes CPT 74769  15   Cognitive Therapeutic Interventions, each additional 15 minutes CPT 48058  30         UNTIMED  Procedure Min.             Total Timed Units: 3  Total Untimed Units: 0  Charges Billed/Number of units: 3    Short Term Goals: (4 weeks) Current Progress:   1.Patient will complete tasks requiring divided attention and alternating attention with 90% accuracy given min cues to improve attention skills     Progressing/ Not Met 11/16/2022   Completed alternating attention task with 52% accuracy Independently       2. Patient will  immediately recall specific details from information recently seen or heard using memory retrieval strategies with 90% accuracy given min cues to improve auditory and visual recall.      Progressing/ Not Met 11/16/2022   Not formally addressed       3. Patient will complete delayed memory tasks using memory retrieval strategies with 90% accuracy given min cues      Progressing/ Not Met 11/16/2022   Not formally addressed       4. Patient will complete simple -moderate reasoning/problem solving tasks with 90% accuracy given min cues      Progressing/ Not Met 11/16/2022   Completed simple calendar activity with 100% accuracy Independently      Completed personal calendar with therapy appointment dates and times, mod A needed.        5. Patient will complete mental manipulation tasks with 90% accuracy given min cues to improve working memory/mental flexibility.      Progressing/ Not Met 11/16/2022   Not formally addressed       6. Patient will identify mistakes in a medication sorter with 80% accuracy and minimal cues to increase functional attention and problem solving.      Progressing/ Not Met 11/16/2022   Daughter reports not needing to complete due to set organization at home.     Goal Not Met / Discontinue         7. Patient will sequence 6 step activities when presented written to the patient with 80% accuracy and moderate cues.      Progressing/ Not Met 11/16/2022   Completed 6 step sequencing with 70% accuracy Independently, however 100% when discussed with clinician        Patient Education/Response:   Discussed in detail memory strategies, making a list of things to review prior to leaving a room, and creating a visual sign for morning medication    Home program established: Program modified based on patient progress  Exercises were reviewed and Debo was able to demonstrate them prior to the end of the session.  Debo demonstrated good  understanding of the education provided.     See Electronic  Medical Record under Patient Instructions for exercises provided throughout therapy.  Assessment:   Debo is progressing well towards her goals. Continued step sequencing with difficulty, patient often did not fill out a step and/or put same number under multiple steps. Once discussed she was able to self correct. Completed calendar activity due to reported request of more independence, patient demonstrated no difficulty. Attempted personal therapy calendar to increase complexity, patient completed with little difficulty and reduced awareness of needed information. Additionally completed alternating attention task with mild difficulty, difficulty noted with visuospatial skills.Current goals remain appropriate. Goals to be updated as necessary.     Patient prognosis is Good. Patient will continue to benefit from skilled outpatient speech and language therapy to address the deficits listed in the problem list on initial evaluation, provide patient/family education and to maximize patient's level of independence in the home and community environment.   Medical necessity is demonstrated by the following IMPAIRMENTS:  Deficits in executive functioning, attention, and memory prevent the pt from relaying medically and safety relevant information in a timely manner in a state of emergency.    Barriers to Therapy: none  Patient's spiritual, cultural and educational needs considered and patient agreeable to plan of care and goals.  Plan:   Continue Plan of Care with focus on memory, attention, and executive functioning.     ANN Gillette, CCC-SLP   11/16/2022

## 2022-11-23 ENCOUNTER — CLINICAL SUPPORT (OUTPATIENT)
Dept: REHABILITATION | Facility: HOSPITAL | Age: 87
End: 2022-11-23
Attending: FAMILY MEDICINE
Payer: MEDICARE

## 2022-11-23 DIAGNOSIS — Z74.09 DECREASED STRENGTH, ENDURANCE, AND MOBILITY: Primary | ICD-10-CM

## 2022-11-23 DIAGNOSIS — R26.89 BALANCE PROBLEMS: ICD-10-CM

## 2022-11-23 DIAGNOSIS — R53.1 DECREASED STRENGTH, ENDURANCE, AND MOBILITY: Primary | ICD-10-CM

## 2022-11-23 DIAGNOSIS — R68.89 DECREASED STRENGTH, ENDURANCE, AND MOBILITY: Primary | ICD-10-CM

## 2022-11-23 DIAGNOSIS — I63.9 CEREBROVASCULAR ACCIDENT (CVA), UNSPECIFIED MECHANISM: Primary | ICD-10-CM

## 2022-11-23 PROCEDURE — 97129 THER IVNTJ 1ST 15 MIN: CPT | Mod: PN

## 2022-11-23 PROCEDURE — 97130 THER IVNTJ EA ADDL 15 MIN: CPT | Mod: PN,GN

## 2022-11-23 PROCEDURE — 97110 THERAPEUTIC EXERCISES: CPT | Mod: PN

## 2022-11-23 NOTE — PROGRESS NOTES
"[facility]   Physical Therapy Treatment Note     Name: Debo Ceballos  Clinic Number: [id number]    Therapy Diagnosis:   Encounter Diagnoses   Name Primary?    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     She was compliant with HEP.  Response to previous treatment: fine   Functional change: ongoing    Pain: 0/10  Location: none reported    OBJECTIVE     Objective Measures updated at progress report unless specified.     TREATMENT     Debo received the treatments listed below:      received therapeutic exercises to develop strength and ROM for 45 minutes including:    Recumbent Bike x 8 min - L2.5    Standing hip abduction RTB 3x10  Standing hip extension RTB 3x10    Lateral walking RTB x 8 lengths - 2 trials    Curb Step Training:    - 4" step ups 10x2 bilateral - 1st set UUE, 2nd set UUE PRN    - 6" step taps 10x2 bilateral - UUE PRN    - 6" step ups 10x1 bilateral - UUE     Floor Transfer Training:    - therapist performed demonstrating     neuromuscular re-education activities to improve: Balance, Coordination, and Proprioception for 00 " minutes. The following activities were included:    NBOS, firm, eyes open 2x30 sec  NBOS, firm, eyes closed 3x30 - one finger support  +NBOS, firm, pitch/yaw 2x30 ea  Semi Tandem 2x30 sec eyes open  Semi Tande 2x30 sec eyes closed      PATIENT EDUCATION AND HOME EXERCISES     Home Exercises Provided and Patient Education Provided     Education provided:   - role of PT, plan of care, goals, scheduling limitations, cancellation policies  - HEP performance    Written Home Exercises Provided: yes. Exercises were reviewed and Debo was able to demonstrate them prior to the end of the session.  Debo demonstrated good  understanding of the education provided. See EMR under Patient Instructions for exercises provided during therapy sessions    ASSESSMENT   Debo demonstrating continued limitations involving confidence in step taps and step ups with minimal to no upper extremity support. Educated on safety with mobility and safety with tranfers/floor transfers which will be addressed next session with primary PT. Pt remains appropriate for therapy at this time and will discharge in upcoming weeks as has progressed well with PT therapy services.       Debo Is progressing well towards her goals.   Pt prognosis is Good.     Pt will continue to benefit from skilled outpatient physical therapy to address the deficits listed in the problem list box on initial evaluation, provide pt/family education and to maximize pt's level of independence in the home and community environment.     Pt's spiritual, cultural and educational needs considered and pt agreeable to plan of care and goals.     Anticipated barriers to physical therapy: none    Short Term Goals- 4 Weeks  Pt to demonstrate independence in performance of HEP in order to improve daily level of physical activity and improve carry over session to session.  Pt to improve B LE strength by > / = 1/2 MMT score in order to improve strength for daily activities.  Pt to  demonstrate 5 time sit to stand score of < / = 12 seconds in order to maximize functional independence and functional strength.  Pt to demonstrate ability to balance for > / = 15 seconds on all MCTSIB conditions with minimal sway in order to improve vestibular response and decrease fall risk.      Long Term Goals - 12 Weeks  Pt to demonstrate compliance with HEP > / = 3x per week in order to improve daily level of physical activity and improve carry over session to session.  Pt to improve B LE strength by > / = 1 MMT score in order to improve strength for daily activities.  Pt to demonstrate TUG score of < / = 10 seconds in order to decrease fall risk and maximize functional strength for daily activities.  Pt to demonstrate 5 time sit to stand score of < / = 10 seconds in order to maximize functional independence and functional strength.  Pt to demonstrate FGA score of > / = 18 in order to maximize dynamic balance for ADLs and decrease fall risk.   Pt to improve self selected walking speed to > / = 0.85 m/s in order to maximize safety and confidence in household and community mobility.   Pt to demonstrate ability to balance for > / = 30 seconds on all MCTSIB conditions with no sway in order to improve vestibular response and decrease fall risk.     PLAN   Current Plan of care Certification: 10/24/2022 to 12/24/22.     Current POC: Outpatient Physical Therapy 1 times weekly for 8 weeks to include the following interventions: Gait Training, Manual Therapy, Moist Heat/ Ice, Neuromuscular Re-ed, Patient Education, Therapeutic Activities, and Therapeutic Exercise.     Continue standing LE strengthening and vestibular challenges as appropriate. Curb outside next session as appropriate.   Would like to address floor transfers.     Brandi Kerr, PT, DPT, NCS  11/23/2022

## 2022-11-23 NOTE — PROGRESS NOTES
[facility]  Speech Therapy Treatment Note- Neurological Rehabilitation  Date: 11/23/2022     Name: Debo Ceballos   MRN: [id number]   Therapy Diagnosis:   Encounter Diagnosis   Name Primary?    Cerebrovascular accident (CVA), unspecified mechanism Yes       Physician: Magi Amezcua MD  Physician Orders: [id number] - AMB REFERRAL/CONSULT TO SPEECH THERAPY  Medical Diagnosis: Hemiplegia, unspecified affecting left nondominant side [G81.94], Cerebral infarction due to unspecified occlusion or stenosis of right middle cerebral artery [I63.511], Memory deficit following cerebral infarction [I69.311]    Visit #/ Visits Authorized: 3/ 20  Date of Evaluation:  10/28/22  Insurance Authorization Period: 10/21/22-12/31/22  Plan of Care Expiration Date:    10/28/2022 to 12/9/22   Extended Plan of Care:  n/a   Progress Note: 11/28/22   Visits Cancelled: 0  Visits No Show: 0    Time In:  10:15am   Time Out:  11:00am   Total Billable Time: 45 minutes     Precautions: Standard  Subjective:   Patient reports: diffiuclty with calendar has been calling to ask daughter to confirm. Did not put sign up in bathroom but feels she is doing better.   She was compliant to home exercise program.   Response to previous treatment: first after   Pain Scale:  0/10 on a Visual Analog Scale currently.   Pain Location: n/a  Objective:   TIMED  Procedure Min.   Cognitive Therapeutic Interventions, first 15 minutes CPT 12142  15   Cognitive Therapeutic Interventions, each additional 15 minutes CPT 61762  30         UNTIMED  Procedure Min.             Total Timed Units: 3  Total Untimed Units: 0  Charges Billed/Number of units: 3    Short Term Goals: (4 weeks) Current Progress:   1.Patient will complete tasks requiring divided attention and alternating attention with 90% accuracy given min cues to improve attention skills     Progressing/ Not Met 11/23/2022   Completed alternating attention task with 52% accuracy Independently        2. Patient will immediately recall specific details from information recently seen or heard using memory retrieval strategies with 90% accuracy given min cues to improve auditory and visual recall.      Progressing/ Not Met 11/23/2022   Not formally addressed       3. Patient will complete delayed memory tasks using memory retrieval strategies with 90% accuracy given min cues      Progressing/ Not Met 11/23/2022   Not formally addressed       4. Patient will complete simple -moderate reasoning/problem solving tasks with 90% accuracy given min cues      Progressing/ Not Met 11/23/2022   Completed simple calendar activity with 100% accuracy Independently      Completed personal calendar with therapy appointment dates and times, mod A needed.        5. Patient will complete mental manipulation tasks with 90% accuracy given min cues to improve working memory/mental flexibility.      Progressing/ Not Met 11/23/2022   Not formally addressed       6. Patient will identify mistakes in a medication sorter with 80% accuracy and minimal cues to increase functional attention and problem solving.      Progressing/ Not Met 11/23/2022   Daughter reports not needing to complete due to set organization at home.     Goal Not Met / Discontinue         7. Patient will sequence 6 step activities when presented written to the patient with 80% accuracy and moderate cues.      Progressing/ Not Met 11/23/2022   Completed 6 step sequencing with 70% accuracy Independently, however 100% when discussed with clinician        Patient Education/Response:   Discussed in detail memory strategies, making a list of things to review prior to leaving a room, and creating a visual sign for morning medication    Home program established: Program modified based on patient progress  Exercises were reviewed and Debo was able to demonstrate them prior to the end of the session.  Debo demonstrated good  understanding of the education provided.      See Electronic Medical Record under Patient Instructions for exercises provided throughout therapy.  Assessment:   Debo is progressing well towards her goals. Completed moderate calendar activity today due to current home difficulty of calendars. Patient needed mod cues to complete calendar. Additionally discussed importance of strategy use and how to set up alarms in phone with written instructions. Continue to focus on functional daily living difficulties.  Current goals remain appropriate. Goals to be updated as necessary.     Patient prognosis is Good. Patient will continue to benefit from skilled outpatient speech and language therapy to address the deficits listed in the problem list on initial evaluation, provide patient/family education and to maximize patient's level of independence in the home and community environment.   Medical necessity is demonstrated by the following IMPAIRMENTS:  Deficits in executive functioning, attention, and memory prevent the pt from relaying medically and safety relevant information in a timely manner in a state of emergency.    Barriers to Therapy: none  Patient's spiritual, cultural and educational needs considered and patient agreeable to plan of care and goals.  Plan:   Continue Plan of Care with focus on memory, attention, and executive functioning.     ANN Gillette, CCC-SLP   11/23/2022

## 2022-11-30 ENCOUNTER — CLINICAL SUPPORT (OUTPATIENT)
Dept: REHABILITATION | Facility: HOSPITAL | Age: 87
End: 2022-11-30
Attending: FAMILY MEDICINE
Payer: MEDICARE

## 2022-11-30 DIAGNOSIS — I63.9 CEREBROVASCULAR ACCIDENT (CVA), UNSPECIFIED MECHANISM: Primary | ICD-10-CM

## 2022-11-30 DIAGNOSIS — R53.1 DECREASED STRENGTH, ENDURANCE, AND MOBILITY: Primary | ICD-10-CM

## 2022-11-30 DIAGNOSIS — R68.89 DECREASED STRENGTH, ENDURANCE, AND MOBILITY: Primary | ICD-10-CM

## 2022-11-30 DIAGNOSIS — Z74.09 DECREASED STRENGTH, ENDURANCE, AND MOBILITY: Primary | ICD-10-CM

## 2022-11-30 DIAGNOSIS — R26.89 BALANCE PROBLEMS: ICD-10-CM

## 2022-11-30 PROCEDURE — 97129 THER IVNTJ 1ST 15 MIN: CPT | Mod: PN,GN

## 2022-11-30 PROCEDURE — 97110 THERAPEUTIC EXERCISES: CPT | Mod: PN,GP

## 2022-11-30 PROCEDURE — 97112 NEUROMUSCULAR REEDUCATION: CPT | Mod: PN

## 2022-11-30 PROCEDURE — 97130 THER IVNTJ EA ADDL 15 MIN: CPT | Mod: PN,GN

## 2022-11-30 NOTE — PROGRESS NOTES
"[facility]  Speech Therapy Progress Note- Neurological Rehabilitation  Date: 11/30/2022     Name: Debo Ceballos   MRN: [id number]   Therapy Diagnosis:   Encounter Diagnosis   Name Primary?    Cerebrovascular accident (CVA), unspecified mechanism Yes       Physician: Magi Amezcua MD  Physician Orders: [id number] - AMB REFERRAL/CONSULT TO SPEECH THERAPY  Medical Diagnosis: Hemiplegia, unspecified affecting left nondominant side [G81.94], Cerebral infarction due to unspecified occlusion or stenosis of right middle cerebral artery [I63.511], Memory deficit following cerebral infarction [I69.311]    Visit #/ Visits Authorized: 4/ 20  Date of Evaluation:  10/28/22  Insurance Authorization Period: 10/21/22-12/31/22  Plan of Care Expiration Date:    10/28/2022 to 12/9/22   Extended Plan of Care:  n/a   Progress Note: 11/28/22   Visits Cancelled: 0  Visits No Show: 0    Time In:  10:15am   Time Out:  11:00am   Total Billable Time: 45 minutes     Precautions: Standard  Subjective:   Patient reports: has not completed any at home calendar activities. Reports her calendar at home is "messed up"  She was compliant to home exercise program.   Response to previous treatment: first after   Pain Scale:  0/10 on a Visual Analog Scale currently.   Pain Location: n/a  Objective:   TIMED  Procedure Min.   Cognitive Therapeutic Interventions, first 15 minutes CPT 36160  15   Cognitive Therapeutic Interventions, each additional 15 minutes CPT 86479  30         UNTIMED  Procedure Min.             Total Timed Units: 3  Total Untimed Units: 0  Charges Billed/Number of units: 3    Short Term Goals: (4 weeks) Current Progress:   1.Patient will complete tasks requiring divided attention and alternating attention with 90% accuracy given min cues to improve attention skills     Progressing/ Not Met 11/30/2022   Completed alternating attention task with 72% accuracy Independently (increased from 52)    Additionally completed " sustained attention with visual scanning, patient asked multiple questions confirming instructions throughout activity, completed with 98% accuracy Independently      Completed divided attention task with connecting numbers and shapes with mod A needed for instructions including model and entirety of worksheet      2. Patient will immediately recall specific details from information recently seen or heard using memory retrieval strategies with 90% accuracy given min cues to improve auditory and visual recall.      Progressing/ Not Met 11/30/2022   Not formally addressed       3. Patient will complete delayed memory tasks using memory retrieval strategies with 90% accuracy given min cues      Progressing/ Not Met 11/30/2022   Not formally addressed       4. Patient will complete simple -moderate reasoning/problem solving tasks with 90% accuracy given min cues      Progressing/ Not Met 11/30/2022   Not formally addressed         5. Patient will complete mental manipulation tasks with 90% accuracy given min cues to improve working memory/mental flexibility.      Progressing/ Not Met 11/30/2022   Not formally addressed       6. Patient will identify mistakes in a medication sorter with 80% accuracy and minimal cues to increase functional attention and problem solving.      Progressing/ Not Met 11/30/2022   Daughter reports not needing to complete due to set organization at home.     Goal Not Met / Discontinue         7. Patient will sequence 6 step activities when presented written to the patient with 80% accuracy and moderate cues.      Progressing/ Not Met 11/30/2022   Not formally addressed          Patient Education/Response:   Discussed in extensive detail memory strategies, making a list of things to review prior to leaving a room, and creating a visual sign for morning medication. Additionally discussed phone logs to enhance education. Overall extensive stroke education today regarding reason why she is having  difficulty in cognitive areas.     Home program established: Program modified based on patient progress  Exercises were reviewed and Debo was able to demonstrate them prior to the end of the session.  Debo demonstrated good  understanding of the education provided.     See Electronic Medical Record under Patient Instructions for exercises provided throughout therapy.  Assessment:   Debo is progressing well towards her goals. Majority of session targeted attention and education on difficulties following a stroke. Discussed reason behind activities and how they will help her difficulty at home. Divided attention was determined to be home difficult when completing different attention worksheets, mod A needed with divided attention. Additionally discussed functional calendar and bringing calendar in next session to practice. Discussed keeping a phone log along with importance of memory strategies for improvement to occur at home.   Current goals remain appropriate. Goals to be updated as necessary.     Patient prognosis is Good. Patient will continue to benefit from skilled outpatient speech and language therapy to address the deficits listed in the problem list on initial evaluation, provide patient/family education and to maximize patient's level of independence in the home and community environment.   Medical necessity is demonstrated by the following IMPAIRMENTS:  Deficits in executive functioning, attention, and memory prevent the pt from relaying medically and safety relevant information in a timely manner in a state of emergency.    Barriers to Therapy: none  Patient's spiritual, cultural and educational needs considered and patient agreeable to plan of care and goals.  Plan:   Continue Plan of Care with focus on memory, attention, and executive functioning.     ANN Gillette, CCC-SLP   11/30/2022

## 2022-11-30 NOTE — PROGRESS NOTES
[facility]   Physical Therapy Treatment and Discharge Note     Name: Debo Ceballos  Clinic Number: [id number]    Therapy Diagnosis:   Encounter Diagnoses   Name Primary?    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    She was compliant with HEP.  Response to previous treatment: fine   Functional change: ongoing    Pain: 0/10  Location: none reported    OBJECTIVE        Lower Extremity Strength    RLE Eval R LE 11/30 LLE Eval L LE 11/30   Hip Flexion: 4-/5 4/5 5/5 5/5   Hip Extension:  NT NT NT NT   Hip Abduction:  seated 4/5 5/5 4/5 5/5   Hip Adduction:  seated 4/5 5/5 4/5 5/5   Knee Extension: 5/5 5/5 5/5 5/5   Knee Flexion:  seated 5/5 5/5 5/5 5/5   Ankle Dorsiflexion: 5/5 5/5 5/5 5/5   Ankle Plantarflexion:  seated 5/5 5/5 5/5 5/5   Ankle Inversion: 5/5 5/5 5/5 5/5   Ankle Eversion: 5/5 5/5 4/5 (pain in L 5th toe) 5/5           Evaluation 11/30/22   5 times sit-stand 15 seconds  >12 sec= fall risk for general elderly  >16 sec= fall risk for Parkinson's disease  >10 sec= balance/vestibular dysfunction (<59 y/o)  >14.2 sec= balance/vestibular dysfunction (>59 y/o)  >12 sec= fall risk for CVA 14 seconds - no UE support      Postural  control:  MCTSIB:  1. Eyes Open/feet together/Firm: 30 seconds  2. Eyes Closed/feet together/Firm: 18 seconds (2 seconds)  3. Eyes Open/feet together/Foam: 15 seconds (30 seconds, min sway)  4. Eyes Closed/feet together/Foam: unable     Gait Assessment:   - AD used: joyce  - Assistance: mod I  - Distance: 100 ft (in and out of clinic)     GAIT DEVIATIONS:  Gait component performance:   Decreased step length B  Decreased reciprocal arm swing  Decreased pelvic rotation throughout gait  Decreased gait speed  * Above impairments indicate decreased gait safety and increased fall risk.       Evaluation 11/30/22   Timed Up and Go 13.3 sec  < 20 sec safe for independent transfers,     < 30 sec assist required for transfers 13 seconds   6 meter walk test 0.75 m/sec (6m/8s) 0.75 m/sec       Functional Gait Assessment:   1. Gait on level surface =  1              (3) Normal: less than 5.5 sec, no A.D., no imbalance, normal gait pattern, deviates< 6in              (2) Mild impairment: 7-5.6 sec, uses A.D., mild gait deviations, or deviates 6-10 in              (1) Moderate impairment: > 7 sec, slow speed, imbalance, deviates 10-15 in.              (0) Severe impairment: needs assist, deviates >15 in, reach/touch wall  2. Change in Gait Speed = 3              (3) Normal: smooth change w/o loss of balance or gait deviation, deviates < 6 in, significant difference between speeds              (2) Mild impairment: changes speed, but demonstrates mild gait deviations, deviates 6-10 in, OR no deviations but unable to significantly speed, OR uses A.D.              (1) Moderate impairment: minor changes to speed, OR changes speed w/ significant deviations, deviates 10-15 in, OR  Changes speed , but loses balance & recovers              (0) Severe impairment: cannot change speed, deviates >15 in, or loses balance & needs assist  3. Gait with horizontal head turns  = 3              (3) Normal: no change in gait, deviates <6 in               (2) Mild impairment: slight change in speed, deviates 6-10 in, OR uses A.D.              (1) Moderate impairment: moderate change in speed, deviates 10-15 in              (0) Severe impairment: severe disruption of gait, deviates >15in  4. Gait with vertical head turns = 3              (3) Normal: no change in gait, deviates <6 in              (2) Mild impairment: slight change in speed, deviates 6-10 in OR uses A.D.              (1) Moderate impairment: moderate change in speed, deviates 10-15 in              (0) Severe impairment: severe disruption of gait, deviates >15 in  5. Gait with pivot turns = 1              (3) Normal: performs safely in 3 sec, no LOB              (2) Mild impairment: performs in >3 sec & no LOB, OR turns safely & requires several steps to regain LOB              (1) Moderate impairment: turns slow, OR requires several small steps for balance following turn & stop              (0) Severe impairment: cannot turn safely, needs assist  6. Step over obstacle = 2              (3) Normal: steps over 2 stacked boxes w/o change in speed or LOB              (2) Mild impairment: able to step over 1 box w/o change in speed or LOB              (1) Moderate impairment: steps over 1 box but must slow down, may require VC              (0) Severe impairment: cannot perform w/o assist  7. Gait with Narrow DOMENICA = 0              (3) Normal: 10 steps no staggering              (2) Mild impairment: 7-9 steps              (1) Moderate impairment: 4-7 steps              (0) Severe impairment: < 4 steps or cannot perform w/o assist  8. Gait with eyes closed = 0              (3) Normal: < 7 sec, no A.D., no LOB, normal gait pattern, deviates <6 in              (2) Mild impairment: 7.1-9 sec, mild gait deviations, deviates 6-10 in              (1) Moderate impairment: > 9 sec, abnormal pattern, LOB, deviates 10-15 in              (0) Severe impairment: cannot perform w/o assist, LOB, deviates >15in  9. Ambulating  Backwards = 1              (3) Normal: no A.D., no LOB, normal gait pattern, deviates <6in              (2) Mild impairment: uses A.D., slower speed, mild gait deviations, deviates 6-10 in              (1) Moderate impairment: slow speed, abnormal gait pattern, LOB, deviates 10-15 in              (0) Severe impairment: severe gait deviations or LOB, deviates >15in  10. Steps = 2              (3) Normal: alternating feet, no rail              (2) Mild Impairment: alternating feet, uses rail              (1) Moderate impairment: step-to, uses rail              (0) Severe impairment: cannot perform safely     Score 16/30    (previous: 15)     Score:   <22/30 fall risk   <20/30 fall risk in older adults   <18/30 fall risk in Parkinsons     TREATMENT     Debo received the treatments listed below:      received therapeutic exercises to develop strength and ROM for 35 minutes including:    Testing listed above.    Recumbent Bike x 8 min - L2.5    Standing hip abduction RTB 3x10  Standing hip extension RTB 3x10    Lateral walking RTB x 8 lengths - 2 trials    neuromuscular re-education activities to improve: Balance, Coordination, and Proprioception for 10 minutes. The following activities were included:    NBOS, firm, eyes open 2x30 sec  NBOS, firm, eyes closed 3x30 - one finger support  NBOS, firm, pitch/yaw 2x30 ea  Semi Tandem 2x30 sec eyes open  Semi Tande 2x30 sec eyes closed      PATIENT EDUCATION AND HOME EXERCISES     Home Exercises Provided and Patient Education Provided     Education provided:   - role of PT, plan of care, goals, scheduling limitations, cancellation policies  - HEP performance    Written Home Exercises Provided: yes. Exercises were reviewed and Debo was able to demonstrate them prior to the end of the session.  Debo demonstrated good  understanding of the education provided. See EMR under Patient Instructions for exercises provided during therapy sessions    ASSESSMENT   Debo  tolerated session well today. Progress note performed this session. Improvement noted in B hip strength as indicated by MMT.   Pt also with improvement noted in B LE strength as indicated by improved 5 time sit to stand score. Pt with no significant change noted in TUG or self selected gait speed, however, generally remains within normative scoring for age range. Pt with improvement noted in balance of condition 2 of MCTSIB, however, continues with poor vestibular response. Overall, pt appears to be at functional baseline at this time. Pt met 2/4 STGs and 2/7 LTGs. Some goals not met this date secondary to age related limitations, reaching functional baseline. Pt able to demo HEP with independence, safely. Secondary to return to baseline post CVA and independence with HEP, pt is appropriate for d/c from therapy.     Short Term Goals- 4 Weeks  Pt to demonstrate independence in performance of HEP in order to improve daily level of physical activity and improve carry over session to session. -MET 11/30/22  Pt to improve B LE strength by > / = 1/2 MMT score in order to improve strength for daily activities. -MET 11/30/22  Pt to demonstrate 5 time sit to stand score of < / = 12 seconds in order to maximize functional independence and functional strength. -not met  Pt to demonstrate ability to balance for > / = 15 seconds on all MCTSIB conditions with minimal sway in order to improve vestibular response and decrease fall risk. -not met     Long Term Goals - 12 Weeks  Pt to demonstrate compliance with HEP > / = 3x per week in order to improve daily level of physical activity and improve carry over session to session. -MET 11/30/22  Pt to improve B LE strength by > / = 1 MMT score in order to improve strength for daily activities. -MET 11/30/22  Pt to demonstrate TUG score of < / = 10 seconds in order to decrease fall risk and maximize functional strength for daily activities. -not met  Pt to demonstrate 5 time sit to stand  score of < / = 10 seconds in order to maximize functional independence and functional strength. -not met  Pt to demonstrate FGA score of > / = 18 in order to maximize dynamic balance for ADLs and decrease fall risk. -not met  Pt to improve self selected walking speed to > / = 0.85 m/s in order to maximize safety and confidence in household and community mobility. -not met  Pt to demonstrate ability to balance for > / = 30 seconds on all MCTSIB conditions with no sway in order to improve vestibular response and decrease fall risk. -not met    PLAN     Discharge from skilled PT services.     Paulina Read, PT, DPT, NCS  11/30/2022

## 2022-12-05 ENCOUNTER — CLINICAL SUPPORT (OUTPATIENT)
Dept: REHABILITATION | Facility: HOSPITAL | Age: 87
End: 2022-12-05
Attending: FAMILY MEDICINE
Payer: MEDICARE

## 2022-12-05 DIAGNOSIS — I63.9 CEREBROVASCULAR ACCIDENT (CVA), UNSPECIFIED MECHANISM: Primary | ICD-10-CM

## 2022-12-05 PROCEDURE — 97129 THER IVNTJ 1ST 15 MIN: CPT | Mod: PN

## 2022-12-05 PROCEDURE — 97130 THER IVNTJ EA ADDL 15 MIN: CPT | Mod: PN

## 2022-12-05 NOTE — PROGRESS NOTES
"[facility]  Speech Therapy Progress Note- Neurological Rehabilitation  Date: 12/5/2022     Name: Debo Ceballos   MRN: [id number]   Therapy Diagnosis:   Encounter Diagnosis   Name Primary?    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 Reports her calendar at home is "messed up"  She was compliant to home exercise program.   Response to previous treatment: first after   Pain Scale:  0/10 on a Visual Analog Scale currently.   Pain Location: n/a  Objective:   TIMED  Procedure Min.   Cognitive Therapeutic Interventions, first 15 minutes CPT 57842  15   Cognitive Therapeutic Interventions, each additional 15 minutes CPT 22017  30         UNTIMED  Procedure Min.             Total Timed Units: 3  Total Untimed Units: 0  Charges Billed/Number of units: 3    Short Term Goals: (4 weeks) Current Progress:   1.Patient will complete tasks requiring divided attention and alternating attention with 90% accuracy given min cues to improve attention skills     Progressing/ Not Met 12/5/2022   Not formally addressed         2. Patient will immediately recall specific details from information recently seen " or heard using memory retrieval strategies with 90% accuracy given min cues to improve auditory and visual recall.      Progressing/ Not Met 12/5/2022   Completed simple paragraph recall with 40% accuracy and 80% with mod-max A (binary choice and repetition)    Completed voicemail recall with 85% accuracy, noted significant improvement lei encouraged to write down notes.       3. Patient will complete delayed memory tasks using memory retrieval strategies with 90% accuracy given min cues      Progressing/ Not Met 12/5/2022   Not formally addressed       4. Patient will complete simple -moderate reasoning/problem solving tasks with 90% accuracy given min cues      Progressing/ Not Met 12/5/2022   Not formally addressed         5. Patient will complete mental manipulation tasks with 90% accuracy given min cues to improve working memory/mental flexibility.      Progressing/ Not Met 12/5/2022   Not formally addressed       6. Patient will identify mistakes in a medication sorter with 80% accuracy and minimal cues to increase functional attention and problem solving.      Progressing/ Not Met 12/5/2022   While previously reported she had a set organization, today reported she had significant trouble with medication.     Completed medication management with mod-max A from clinician         7. Patient will sequence 6 step activities when presented written to the patient with 80% accuracy and moderate cues.      Progressing/ Not Met 12/5/2022   Not formally addressed          Patient Education/Response:   Discussed in extensive detail memory strategies, making a list of things to review prior to leaving a room, and creating a visual sign for morning medication. Additionally discussed phone logs to enhance education. Overall extensive stroke education today regarding reason why she is having difficulty in cognitive areas.     Home program established: Program modified based on patient progress  Exercises were reviewed and  Debo was able to demonstrate them prior to the end of the session.  Debo demonstrated good  understanding of the education provided.     See Electronic Medical Record under Patient Instructions for exercises provided throughout therapy.  Assessment:   Debo is progressing well towards her goals. Completed memory tasks today including memory accuracy with and without strategies, using strategies were demonstrated to significantly increase patient success.  Additionally targeted medication management due to reported difficulty.  Patient required mod-max A from clinician, continue to target pill management. Current goals remain appropriate. Goals to be updated as necessary.     Patient prognosis is Good. Patient will continue to benefit from skilled outpatient speech and language therapy to address the deficits listed in the problem list on initial evaluation, provide patient/family education and to maximize patient's level of independence in the home and community environment.   Medical necessity is demonstrated by the following IMPAIRMENTS:  Deficits in executive functioning, attention, and memory prevent the pt from relaying medically and safety relevant information in a timely manner in a state of emergency.    Barriers to Therapy: none  Patient's spiritual, cultural and educational needs considered and patient agreeable to plan of care and goals.  Plan:   Continue Plan of Care with focus on memory, attention, and executive functioning.     ANN Gillette, CCC-SLP   12/5/2022

## 2022-12-09 NOTE — PLAN OF CARE
[facility]  Physical Therapy Discharge Note    Name: Debo Ceballos  Clinic Number: [id number]    Therapy Diagnosis:   Encounter Diagnoses   Name Primary?    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 Progress note performed this session. Improvement noted in B hip strength as indicated by MMT.   Pt also with improvement noted in B LE strength as indicated by improved 5 time sit to stand score. Pt with no significant change noted in TUG or self selected gait speed, however, generally remains within normative scoring for age range. Pt with improvement noted in balance of condition 2 of MCTSIB, however, continues with poor vestibular response. Overall, pt appears to be at functional baseline at this time. Pt met 2/4 STGs and 2/7 LTGs. Some goals not met this date secondary to age related limitations, reaching functional baseline. Pt able to demo HEP with independence, safely. Secondary to return to baseline post CVA and independence with HEP, pt is appropriate for d/c from therapy.      Short Term Goals- 4 Weeks  Pt to demonstrate independence in performance of HEP in order to improve daily level of physical activity and improve carry over session to session. -MET 11/30/22  Pt to improve B LE strength by > / = 1/2 MMT score in order to improve strength for daily activities. -MET 11/30/22  Pt to demonstrate 5 time sit to stand score of < / = 12 seconds in order to maximize functional independence and functional strength. -not met  Pt to  demonstrate ability to balance for > / = 15 seconds on all MCTSIB conditions with minimal sway in order to improve vestibular response and decrease fall risk. -not met     Long Term Goals - 12 Weeks  Pt to demonstrate compliance with HEP > / = 3x per week in order to improve daily level of physical activity and improve carry over session to session. -MET 11/30/22  Pt to improve B LE strength by > / = 1 MMT score in order to improve strength for daily activities. -MET 11/30/22  Pt to demonstrate TUG score of < / = 10 seconds in order to decrease fall risk and maximize functional strength for daily activities. -not met  Pt to demonstrate 5 time sit to stand score of < / = 10 seconds in order to maximize functional independence and functional strength. -not met  Pt to demonstrate FGA score of > / = 18 in order to maximize dynamic balance for ADLs and decrease fall risk. -not met  Pt to improve self selected walking speed to > / = 0.85 m/s in order to maximize safety and confidence in household and community mobility. -not met  Pt to demonstrate ability to balance for > / = 30 seconds on all MCTSIB conditions with no sway in order to improve vestibular response and decrease fall risk. -not met    Discharge reason: Patient has reached the maximum rehab potential for the present time    PLAN   This patient is discharged from Physical Therapy    Paulina Read, PT, DPT  11/30/2022

## 2022-12-12 ENCOUNTER — CLINICAL SUPPORT (OUTPATIENT)
Dept: REHABILITATION | Facility: HOSPITAL | Age: 87
End: 2022-12-12
Attending: FAMILY MEDICINE
Payer: MEDICARE

## 2022-12-12 DIAGNOSIS — I63.9 CEREBROVASCULAR ACCIDENT (CVA), UNSPECIFIED MECHANISM: Primary | ICD-10-CM

## 2022-12-12 PROCEDURE — 97129 THER IVNTJ 1ST 15 MIN: CPT | Mod: PN

## 2022-12-12 PROCEDURE — 97130 THER IVNTJ EA ADDL 15 MIN: CPT | Mod: PN,GN

## 2022-12-12 NOTE — PLAN OF CARE
[facility]  Speech Therapy Updated Plan of Care-Neurological Rehabilitation         Date: 12/12/2022   Name: Debo Ceballos  Clinic Number: [id number]    Therapy Diagnosis:   Encounter Diagnosis   Name Primary?    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 Patient has demonstrated improvement in alternating attention and awareness of deficits. Deficits continue to be observed in areas of divided attention, problem solving, and memory. She has made progress in all areas, however continues to have difficulty completing Independently.  Skilled speech therapy services continue to be warranted to address divided attention, memory, problem solving including calendar planning and solutions to problems. Patient continues with good motivation to utilize learned strategies.     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 Demonstrates  impairments including limitations as described in the problem list. Positive prognostic factors include family support. Negative prognostic factors include age. Noted limited generalization due to limited practice at home, discussed importance of at home pratice and progress in therapy, patient reported she will complete home exercises. She presents with mild cognitive impairement characterized by reduced memory, attention, problem solving, and visuospatial skills . No barriers to therapy identified.. Patient will benefit from skilled, outpatient rehabilitation speech therapy.    Rehab Potential: good   Pt's spiritual, cultural, and educational needs considered and patient agreeable to plan of care and goals.    Education: Plan of Care, role of SLP in care, memory strategies, attention shifting strategies, course of medical disease affect on therapy diagnosis , and scheduling/ cancellation policy     Previous Short Term Goals Status: 4 weeks  Patient will complete tasks requiring divided attention and alternating attention with 90% accuracy given min cues to improve attention skills Goal Not Met / Continue   2. Patient will immediately recall specific details from information recently seen or heard using memory retrieval strategies with 90% accuracy given min cues to improve auditory and visual recall. Goal Not Met / Continue   3. Patient will complete delayed memory tasks using memory retrieval strategies with 90% accuracy given min cues Goal Not Met / Continue   4. Patient will complete simple -moderate reasoning/problem solving tasks with 90% accuracy given min cuesGoal Not Met / Continue   5. Patient will complete mental manipulation tasks with 90% accuracy given min cues to improve working memory/mental flexibility.  Goal Not Met / Continue   6. Patient will identify mistakes in a medication sorter with 80% accuracy and minimal cues to increase functional attention and problem solving.Goal Not Met / Continue    7. Patient will sequence 6 step activities when presented written to the patient with 80% accuracy and moderate cues.Goal Not Met / Continue      New Short Term Goals: 4 weeks  Patient will complete tasks requiring divided attention and alternating attention with 90% accuracy given min cues to improve attention skills Goal Not Met / Continue   2. Patient will immediately recall specific details from information recently seen or heard using memory retrieval strategies with 90% accuracy given min cues to improve auditory and visual recall. Goal Not Met / Continue   3. Patient will complete delayed memory tasks using memory retrieval strategies with 90% accuracy given min cues Goal Not Met / Continue   4. Patient will complete simple -moderate reasoning/problem solving tasks with 90% accuracy given min cuesGoal Not Met / Continue   5. Patient will complete mental manipulation tasks with 90% accuracy given min cues to improve working memory/mental flexibility.  Goal Not Met / Continue   6. Patient will identify mistakes in a medication sorter with 80% accuracy and minimal cues to increase functional attention and problem solving.Goal Not Met / Continue   7. Patient will sequence 6 step activities when presented written to the patient with 80% accuracy and moderate cues.Goal Not Met / Continue         Long Term Goals (6 weeks):   She will use appropriate memory strategies to schedule and recall weekly activities, express needs and recall names to maintain safety and participate socially in functional living environment.   Pt will improve  attention skills to effectively attend to and communicate in simple daily living tasks in functional living environment.   Pt will demonstrate use of  during daily living activities to improve safety and awareness in functional living environment.  Pt will apply problem-solving strategies with no visual support in daily living functional activities at home and in the community.    Pt  will predict objective performance on completion of actual tasks to increase independence with required return to daily living environment.        Reasons for Recertification of Therapy: Deficits in executive functioning, attention, and memory prevent the pt from relaying medically and safety relevant information in a timely manner in a state of emergency.     Plan     Updated Certification Period: 12/12/2022 to 1/23/22    Recommended Treatment Plan: Patient will participate in the [facility] rehabilitation program for speech therapy 2 times per week to address her Cognition deficits, to educate patient and their family, and to participate in a home exercise program.     Other recommendations: not at this time     Therapist's Name:  ANN Gillette, CCC-SLP   12/12/2022      I CERTIFY THE NEED FOR THESE SERVICES FURNISHED UNDER THIS PLAN OF TREATMENT AND WHILE UNDER MY CARE      Physician Name: _______________________________    Physician Signature: ____________________________

## 2022-12-12 NOTE — PROGRESS NOTES
[facility]  Speech Therapy Progress Note- Neurological Rehabilitation  Date: 12/12/2022     Name: Debo Ceballos   MRN: [id number]   Therapy Diagnosis:   Encounter Diagnosis   Name Primary?    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   She was compliant to home exercise program.   Response to previous treatment: first after   Pain Scale:  0/10 on a Visual Analog Scale currently.   Pain Location: n/a  Objective:   TIMED  Procedure Min.   Cognitive Therapeutic Interventions, first 15 minutes CPT 83956  15   Cognitive Therapeutic Interventions, each additional 15 minutes CPT 25790  27         UNTIMED  Procedure Min.             Total Timed Units: 3  Total Untimed Units: 0  Charges Billed/Number of units: 3    Short Term Goals: (4 weeks) Current Progress:   1.Patient will complete tasks requiring divided attention and alternating attention with 90% accuracy given min cues to improve attention skills     Progressing/ Not Met 12/12/2022   Alternating attention task with 100% accuracy Independently      Divided attention task with 70%  accuracy Independently      Continue to target divided attention only.         2. Patient will immediately recall specific details from information recently seen or heard using memory retrieval strategies with 90% accuracy given min cues to improve auditory and visual recall.      Progressing/ Not Met 12/12/2022   Not formally addressed     Continued to discuss memory strategies and importance of applying them at home      3. Patient will complete delayed memory tasks using memory retrieval strategies with 90% accuracy given min cues      Progressing/ Not Met 12/12/2022   Not formally addressed       4. Patient will complete simple -moderate reasoning/problem solving tasks with 90% accuracy given min cues      Progressing/ Not Met 12/12/2022   Attempted to problem solve situations at home that are difficult, patient was unable to discuss solutions without mod-max A from clinician.         5. Patient will complete mental manipulation tasks with 90% accuracy given min cues to improve working memory/mental flexibility.      Progressing/ Not Met 12/12/2022   Not formally addressed       6. Patient will identify mistakes in a medication sorter with 80% accuracy and minimal cues to increase functional attention and problem solving.      Progressing/ Not Met 12/12/2022   While previously reported she had a set organization, today reported she had significant trouble with medication.     Completed medication management with mod-max A from clinician         7. Patient will sequence 6 step activities when presented written to the patient with 80% accuracy and moderate cues.      Progressing/ Not Met 12/12/2022   Completed with 57% accuracy     SLP reminded pt. To double check work          Patient Education/Response:   Discussed in extensive detail memory strategies, making a list of things to review prior to leaving a room, and creating a visual sign for morning medication. Additionally discussed phone logs to enhance  education. Overall extensive stroke education today regarding reason why she is having difficulty in cognitive areas.     Home program established: Program modified based on patient progress  Exercises were reviewed and Debo was able to demonstrate them prior to the end of the session.  Debo demonstrated good  understanding of the education provided.     See Electronic Medical Record under Patient Instructions for exercises provided throughout therapy.  Assessment:   Debo is progressing well towards her goals. Continued to discuss memory strategies and importance of applying and using them in home environment. Attempted to problem solve situations at home that are difficult, patient was unable to discuss solutions without mod-max A from clinician. Target solutions next session. Additionally completed attention tasks with alternating attention and divided attention. Divided attention continues to be difficult at this time, alternating attention improving. Step sequencing continues to be difficult, continue to target. Current goals remain appropriate. Goals to be updated as necessary.     Patient prognosis is Good. Patient will continue to benefit from skilled outpatient speech and language therapy to address the deficits listed in the problem list on initial evaluation, provide patient/family education and to maximize patient's level of independence in the home and community environment.   Medical necessity is demonstrated by the following IMPAIRMENTS:  Deficits in executive functioning, attention, and memory prevent the pt from relaying medically and safety relevant information in a timely manner in a state of emergency.    Barriers to Therapy: none  Patient's spiritual, cultural and educational needs considered and patient agreeable to plan of care and goals.  Plan:   Continue Plan of Care with focus on memory, attention, and executive functioning.     ANN Gillette, CCC-SLP   12/12/2022

## 2022-12-19 ENCOUNTER — CLINICAL SUPPORT (OUTPATIENT)
Dept: REHABILITATION | Facility: HOSPITAL | Age: 87
End: 2022-12-19
Attending: FAMILY MEDICINE
Payer: MEDICARE

## 2022-12-19 DIAGNOSIS — I63.9 CEREBROVASCULAR ACCIDENT (CVA), UNSPECIFIED MECHANISM: Primary | ICD-10-CM

## 2022-12-19 PROCEDURE — 97129 THER IVNTJ 1ST 15 MIN: CPT | Mod: PN,GN

## 2022-12-19 PROCEDURE — 97130 THER IVNTJ EA ADDL 15 MIN: CPT | Mod: PN

## 2022-12-19 PROCEDURE — 97130 THER IVNTJ EA ADDL 15 MIN: CPT | Mod: PN,GN

## 2022-12-19 NOTE — PROGRESS NOTES
"[facility]  Speech Therapy Progress Note- Neurological Rehabilitation  Date: 12/19/2022     Name: Debo Ceballos   MRN: [id number]   Therapy Diagnosis:   Encounter Diagnosis   Name Primary?    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 She additionally reported she saw her doctor recently and stated "I think Dr. Amezcua told me I had alzheimer", was unable to find in chart or note from Dr. Amezcua.   She was compliant to home exercise program.   Response to previous treatment: first after   Pain Scale:  0/10 on a Visual Analog Scale currently.   Pain Location: n/a  Objective:   TIMED  Procedure Min.   Cognitive Therapeutic Interventions, first 15 minutes CPT 64001  15   Cognitive Therapeutic Interventions, each additional 15 minutes CPT 53256  30         UNTIMED  Procedure Min.             Total Timed Units: 3  Total Untimed Units: 0  Charges Billed/Number of units: 3    Short Term Goals: (4 weeks) Current Progress:   1.Patient will complete tasks requiring divided attention and alternating attention with 90% accuracy given min cues to improve attention skills "     Progressing/ Not Met 12/19/2022   Divided attention task with 80% accuracy Independently      Completed divided attention while crossing out letters while holding a conversation regarding upcoming holidays. Difficulty noted         2. Patient will immediately recall specific details from information recently seen or heard using memory retrieval strategies with 90% accuracy given min cues to improve auditory and visual recall.      Progressing/ Not Met 12/19/2022   Not formally addressed     Continued to discuss memory strategies and importance of applying them at home      3. Patient will complete delayed memory tasks using memory retrieval strategies with 90% accuracy given min cues      Progressing/ Not Met 12/19/2022   Not formally addressed       4. Patient will complete simple -moderate reasoning/problem solving tasks with 90% accuracy given min cues      Progressing/ Not Met 12/19/2022   Completed 5 step with 95% accuracy         5. Patient will complete mental manipulation tasks with 90% accuracy given min cues to improve working memory/mental flexibility.      Progressing/ Not Met 12/19/2022   Not formally addressed       6. Patient will identify mistakes in a medication sorter with 80% accuracy and minimal cues to increase functional attention and problem solving.      Progressing/ Not Met 12/19/2022   Not formally addressed           7. Patient will sequence 6 step activities when presented written to the patient with 80% accuracy and moderate cues.      Progressing/ Not Met 12/19/2022   Completed 5 step with 95% accuracy     SLP reminded pt. To double check work          Patient Education/Response:   Discussed in extensive detail memory strategies, making a list of things to review prior to leaving a room, and creating a visual sign for morning medication. Additionally discussed phone logs to enhance education. Overall extensive stroke education today regarding reason why she is having difficulty in  cognitive areas.     Home program established: Program modified based on patient progress  Exercises were reviewed and Debo was able to demonstrate them prior to the end of the session.  Debo demonstrated good  understanding of the education provided.     See Electronic Medical Record under Patient Instructions for exercises provided throughout therapy.  Assessment:   Debo is progressing well towards her goals. Continued to discuss importance of strategies at home. Limited awareness noted. Targeted divided attention tasks today while doing 2 things at once time. Improvement noted compared to previous session, however difficulty continues. Step sequencing improvement noted. Mrs. Edmondson reports a goal is to begin cooking, discussed how to put together mac and cheese for angelo. Current goals remain appropriate. Goals to be updated as necessary.     Patient prognosis is Good. Patient will continue to benefit from skilled outpatient speech and language therapy to address the deficits listed in the problem list on initial evaluation, provide patient/family education and to maximize patient's level of independence in the home and community environment.   Medical necessity is demonstrated by the following IMPAIRMENTS:  Deficits in executive functioning, attention, and memory prevent the pt from relaying medically and safety relevant information in a timely manner in a state of emergency.    Barriers to Therapy: none  Patient's spiritual, cultural and educational needs considered and patient agreeable to plan of care and goals.  Plan:   Continue Plan of Care with focus on memory, attention, and executive functioning.     ANN Gillette, CCC-SLP   12/19/2022

## 2023-01-04 ENCOUNTER — CLINICAL SUPPORT (OUTPATIENT)
Dept: REHABILITATION | Facility: HOSPITAL | Age: 88
End: 2023-01-04
Attending: FAMILY MEDICINE
Payer: MEDICARE

## 2023-01-04 DIAGNOSIS — I63.9 CEREBROVASCULAR ACCIDENT (CVA), UNSPECIFIED MECHANISM: Primary | ICD-10-CM

## 2023-01-04 PROCEDURE — 97129 THER IVNTJ 1ST 15 MIN: CPT | Mod: PN

## 2023-01-04 PROCEDURE — 97130 THER IVNTJ EA ADDL 15 MIN: CPT | Mod: PN

## 2023-01-04 NOTE — PROGRESS NOTES
[facility]  Speech Therapy Progress Note- Neurological Rehabilitation  Date: 1/4/2023     Name: Debo Ceballos   MRN: [id number]   Therapy Diagnosis:   Encounter Diagnosis   Name Primary?    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 Reports she has been using all memory strategies and they have been helpful   She was compliant to home exercise program.   Response to previous treatment: first after   Pain Scale:  0/10 on a Visual Analog Scale currently.   Pain Location: n/a  Objective:   TIMED  Procedure Min.   Cognitive Therapeutic Interventions, first 15 minutes CPT 45312  15   Cognitive Therapeutic Interventions, each additional 15 minutes CPT 46469  27         UNTIMED  Procedure Min.             Total Timed Units: 3  Total Untimed Units: 0  Charges Billed/Number of units: 3    Short Term Goals: (4 weeks) Current Progress:   1.Patient will complete tasks requiring divided attention and alternating attention with 90% accuracy given min cues to improve attention skills     Progressing/ Not Met 1/4/2023   Not formally addressed            2. Patient will immediately recall specific details from information recently seen or heard using memory retrieval strategies with 90% accuracy given min cues to improve auditory and visual recall.      Progressing/ Not Met 1/4/2023   Not formally addressed      3. Patient will complete delayed memory tasks using memory retrieval strategies with 90% accuracy given min cues      Progressing/ Not Met 1/4/2023   Recalled all details from doctor appointment one month ago     Met x1      4. Patient will complete simple -moderate reasoning/problem solving tasks with 90% accuracy given min cues      Progressing/ Not Met 1/4/2023   Completed step by step problem solving fixing a  Independently          5. Patient will complete mental manipulation tasks with 90% accuracy given min cues to improve working memory/mental flexibility.      Progressing/ Not Met 1/4/2023   Not formally addressed       6. Patient will identify mistakes in a medication sorter with 80% accuracy and minimal cues to increase functional attention and problem solving.      Progressing/ Not Met 1/4/2023   Not formally addressed           7. Patient will sequence 6 step activities when presented written to the patient with 80% accuracy and moderate cues.      Progressing/ Not Met 1/4/2023   Not formally addressed           Patient Education/Response:   Discussed in extensive detail memory strategies, making a list of things to review prior to leaving a room, and creating a visual sign for morning medication. Additionally discussed phone logs to enhance education. Overall extensive stroke education today regarding reason why she is having difficulty in cognitive areas.     Home program established: Program modified based on patient progress  Exercises were reviewed and Debo was able to demonstrate them prior to the end of the session.  Debo demonstrated good  understanding of the education provided.     See Electronic Medical  Record under Patient Instructions for exercises provided throughout therapy.  Assessment:   Debo is progressing well towards her goals. Extensive education regarding next steps were discussed during today's session. Patient reports she has made improvements in memory and using strategies, however feels biggest difficulty is driving and having to be dependent on family. With both patient and daughter, discussed solutions to make patient feel more independent without driving. Discussed uber/lyft, ordering groceries, getting a ride from [employer] members, and coming up with a schedule. She demonstrated success when describing steps to fixing her , including watching videos on youtube. She additionally recalled all information from previous doctor appointment 3 weeks ago. Next session, will re-evaluate to determine upcoming goals outside of increasing independence. Current goals remain appropriate. Goals to be updated as necessary.     Patient prognosis is Good. Patient will continue to benefit from skilled outpatient speech and language therapy to address the deficits listed in the problem list on initial evaluation, provide patient/family education and to maximize patient's level of independence in the home and community environment.   Medical necessity is demonstrated by the following IMPAIRMENTS:  Deficits in executive functioning, attention, and memory prevent the pt from relaying medically and safety relevant information in a timely manner in a state of emergency.    Barriers to Therapy: none  Patient's spiritual, cultural and educational needs considered and patient agreeable to plan of care and goals.  Plan:   Continue Plan of Care with focus on memory, attention, and executive functioning.     ANN Gillette, CCC-SLP   1/4/2023

## 2023-01-30 PROBLEM — I63.9 CVA (CEREBRAL VASCULAR ACCIDENT): Status: RESOLVED | Noted: 2022-10-28 | Resolved: 2023-01-30

## 2023-08-31 ENCOUNTER — PATIENT OUTREACH (OUTPATIENT)
Dept: ADMINISTRATIVE | Facility: OTHER | Age: 88
End: 2023-08-31
Payer: MEDICARE

## 2023-08-31 ENCOUNTER — HOSPITAL ENCOUNTER (EMERGENCY)
Facility: HOSPITAL | Age: 88
Discharge: HOME | End: 2023-08-31
Attending: EMERGENCY MEDICINE
Payer: MEDICARE

## 2023-08-31 VITALS
RESPIRATION RATE: 18 BRPM | HEART RATE: 65 BPM | OXYGEN SATURATION: 99 % | BODY MASS INDEX: 25.95 KG/M2 | SYSTOLIC BLOOD PRESSURE: 163 MMHG | TEMPERATURE: 98 F | WEIGHT: 152 LBS | DIASTOLIC BLOOD PRESSURE: 77 MMHG | HEIGHT: 64 IN
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
## 2023-08-31 PROCEDURE — 99284 EMERGENCY DEPT VISIT MOD MDM: CPT | Mod: 25

## 2023-08-31 RX ORDER — ISOSORBIDE MONONITRATE 30 MG/1
30 TABLET, EXTENDED RELEASE ORAL
COMMUNITY
Start: 2023-06-24

## 2023-08-31 NOTE — DISCHARGE INSTRUCTIONS
Please be cautious when getting up from bed, as there is always a risk for a fall.  Installing railings on the bed is always a good idea.  Please return to the ED should you experience new onset symptoms or worsening of previously expressed symptoms.  Please monitor for neurological function, and return to the ED should you find that there becomes a new deficit.

## 2023-08-31 NOTE — ED TRIAGE NOTES
Pt reports falling out of bed this morning and hitting her left forehead on tile floor. Pt denies LOC or blurry vision but does report being on blood thinners. Hematoma and abrasion noted to left forehead, bruising noted to bilateral elbows and knees.   Pt denies SOB, CP, fever/chills.   Pt AAOX4

## 2023-08-31 NOTE — ED PROVIDER NOTES
Encounter Date: 8/31/2023       History     Chief Complaint   Patient presents with    Fall     Pt reports falling out of bed this morning and hitting her left forehead on tile floor. Pt denies LOC or blurry vision but does report being on blood thinners. Hematoma and abrasion noted to left forehead.      The history is provided by the patient and a relative.     Ms. Arrington is a 89-year-old female with a past medical history of Atrial fibrillation (CMS/[facility]ly [facility]), Cancer (CMS/Formerly [facility]), Coronary artery disease, History of radiation therapy, Hypertension, Left hemiplegia (CMS/Formerly [facility]), MI (myocardial infarction) (CMS/Formerly [facility]) (11/27/2017), and Stroke (CMS/HCC) (09/12/2022) who presents with her daughter s/p mechanical fall.  She states she woke up this morning few hours ago, and after trying to get out of her bed which is approximately 1-2 feet high, she fell and hit her head as well as her elbows and knees.  She states that she did not lose consciousness during the fall, and was able to get up and walk after words.  Currently she has pain in her head, as well as both of her knees and elbows.  Of note, she is currently taking Eliquis.  She denies fever, recent illness, shortness of breath, nausea, vomiting, diarrhea and all other symptoms at this time.    Review of patient's allergies indicates:   Allergen Reactions    Aspirin Other (See Comments)     Bruising of fingers.    Apresoline [hydralazine]     Statins-hmg-coa reductase inhibitors      Cramps in legs and feet.    Streptomycin      Told never to take it would not make it to hospital     Past Medical History:   Diagnosis Date    Aortic valve disorders 7/4/2013    Carotid stenosis 6/3/2013    2/3/2011: L CEA for 80%+ stenosis with small ulcer. Mild WILLIAM disease.    Coronary artery disease 11/28/2017 11/28/2017: OB: Cath: LAD: 80% involving D1. D1 osteal 80%. LV: Normal systolic function. LAD: LM 3.0 x 18 mm. D1: PTCA 2.5 mm.    Heart failure, diastolic 6/3/2013    History  of coronary artery stent placement 2017: [facility] [place]: Cath: LAD: LM 3.0 x 18 mm. D1: PTCA 2.5 mm.    Hypercholesterolemia 2013    Cannot tolerate statins -muscle pains.    Hypertension, benign 6/3/2013    Diagnosed .     Past Surgical History:   Procedure Laterality Date    APPENDECTOMY      BUNIONECTOMY Bilateral     CAROTID ENDARTERECTOMY      CATARACT EXTRACTION, BILATERAL      HYSTERECTOMY      TONSILLECTOMY       History reviewed. No pertinent family history.  Social History     Tobacco Use    Smoking status: Former     Current packs/day: 0.00     Average packs/day: 0.3 packs/day for 7.0 years (1.8 ttl pk-yrs)     Types: Cigarettes     Start date: 1951     Quit date: 1958     Years since quittin.7    Smokeless tobacco: Never   Substance Use Topics    Alcohol use: Yes     Comment: monthly     Drug use: No       Physical Exam     Initial Vitals [23 1041]   BP Pulse Resp Temp SpO2   (!) 156/59 61 18 97.7 °F (36.5 °C) 98 %      MAP       --         Physical Exam    Constitutional: She appears well-developed. No distress.   HENT:   Head: Normocephalic and atraumatic.   Mouth/Throat: Oropharynx is clear and moist and mucous membranes are normal.   Eyes: EOM are normal. Pupils are equal, round, and reactive to light.   Neck:   Normal range of motion.  Cardiovascular:  Normal rate, regular rhythm and normal heart sounds.           Pulmonary/Chest: Breath sounds normal. No respiratory distress. She has no wheezes. She has no rhonchi. She has no rales.   Abdominal: Abdomen is soft. She exhibits no distension. There is no abdominal tenderness. There is no rebound and no guarding.   Musculoskeletal:      Cervical back: Normal range of motion.      Comments: There is slight tenderness to palpation of the left and right elbows, as well as the left and right knees.  There is also minor tenderness to palpation of the sternum.  There is no tenderness to palpation in any portion of the  midline of the back.  Slight to moderate tenderness to palpation of the left lateral side of the forehead    5/5 muscle strength in both upper and lower extremities     Neurological: She is alert and oriented to person, place, and time.   All cranial nerves are intact, and there is no focal neurological deficit present.  Sensation intact in all extremities   Skin: Skin is warm.   Small abrasions are seen on the left lateral side of the forehead, as well as both elbows and both knees.   Psychiatric: She has a normal mood and affect. Her behavior is normal.         ED Course   Procedures  Labs Reviewed - No data to display       Imaging Results               CT Head Without Contrast (Final result)  Result time 08/31/23 13:25:43      Final result by Sergio Giraldo MD (08/31/23 13:25:43)                   Impression:      This report was flagged in Epic as abnormal.    1. Foci of low attenuation within the right frontoparietal region and posterior right corona radiata.  These regions may reflect sequela of remote infarct as described on previous exams however those exams are not available for direct review.  Correlation with current symptomatology is recommended, correlation is advised.  2. Sequela of chronic microvascular ischemic change and senescent change.  3. Soft tissue induration/hematoma overlies the left frontal calvarium.      Electronically signed by: Sergio Giraldo MD  Date:    08/31/2023  Time:    13:25               Narrative:    EXAMINATION:  CT HEAD WITHOUT CONTRAST    CLINICAL HISTORY:  Head trauma, minor (Age >= 65y);    TECHNIQUE:  Low dose axial images were obtained through the head.  Coronal and sagittal reformations were also performed. Contrast was not administered.    COMPARISON:  Reports from MRI 09/13/2022 and CT 09/13/2022.    FINDINGS:  There is generalized cerebral volume loss.  There is hypoattenuation in a periventricular fashion, likely sequela of chronic microvascular ischemic  change.There is a focus of low attenuation along the lateral aspect of the right frontoparietal region.  An additional focus is noted along the posterior right corona radiata.  There is no evidence of acute major vascular territory infarct, hemorrhage, or mass.  There is no hydrocephalus.  There are no abnormal extra-axial fluid collections.  The paranasal sinuses and mastoid air cells are clear, and there is no evidence of calvarial fracture.  The visualized soft tissues are are remarkable for a focus of induration/hematoma overlying the left frontal calvarium..                                       Medications - No data to display  Medical Decision Making  Ms. Arrington is a 89-year-old female who presents s/p fall a few hours prior.  Given her status currently taking Eliquis as well as her age and risk factors, decision to obtain CT head without contrast made.  She currently states that although her elbows and knees both hurt, she does not feel a significant pain, and can still move all 4 extremities well.  Given the lack of neuro deficits as well as 5/5 muscle strength in all extremities, we will hold off on extremity x-rays at this time.    CT of the head without contrast was shown to be negative for an intracranial hemorrhage and was unremarkable.  The patient was stable throughout her entire time in the ED, and would like to go home.  Strict return precautions given should she start developing new onset of severe symptoms or new focal neurologic deficits.  Stable for discharge at this time.    Amount and/or Complexity of Data Reviewed  Radiology: ordered.                               Clinical Impression:   Final diagnoses:  [W19.XXXA] Fall, initial encounter (Primary)        ED Disposition Condition    Discharge Stable          ED Prescriptions    None       Follow-up Information       Follow up With Specialties Details Why Contact Info    Magi Amezcua MD Family Medicine  As needed [id number]  [address]  [facility] [address]  [phone]               Mynor Hendrickson MD  Resident  08/31/23 1330

## 2023-08-31 NOTE — PROGRESS NOTES
CHW - Initial Contact    This Community Health Worker completed the Social Determinant of Health questionnaire with patient  and daughter/caregiver at bedside  today.    Pt identified barriers of most importance are: has no needs at this time.   Referrals to community agencies completed with patient/caregiver consent outside of [facility] include: no: none at this time.  Referrals were put through [facility] - no: none at this time.  Support and Services: has support with daughter/caregiver.  Other information discussed the patient needs / wants help with: Completed SDOH with patient and daughter/caregiver at bedside today, pt's daughter/caregiver states pt has no needs at this time, will follow up in one week for possible case closure.   Follow up required: yes.  Follow-up Outreach - Due: 9/6/2023

## 2023-09-15 ENCOUNTER — PATIENT OUTREACH (OUTPATIENT)
Dept: ADMINISTRATIVE | Facility: OTHER | Age: 88
End: 2023-09-15
Payer: MEDICARE

## 2023-09-15 NOTE — PROGRESS NOTES
CHW - Case Closure    This Community Health Worker spoke to caregiver via telephone today.   Pt reported: Patient's daughter/caregiver states pt has no needs or request assistance.  Pt denied any additional needs at this time and agrees with episode closure at this time.  Provided caregiver with Community Health Worker's contact information and encouraged her to contact this Community Health Worker if additional needs arise.